# Patient Record
Sex: MALE | Race: WHITE | NOT HISPANIC OR LATINO | Employment: OTHER | URBAN - METROPOLITAN AREA
[De-identification: names, ages, dates, MRNs, and addresses within clinical notes are randomized per-mention and may not be internally consistent; named-entity substitution may affect disease eponyms.]

---

## 2017-01-04 ENCOUNTER — OFFICE VISIT (OUTPATIENT)
Dept: NEUROLOGY | Facility: CLINIC | Age: 67
End: 2017-01-04

## 2017-01-04 ENCOUNTER — LAB (OUTPATIENT)
Dept: LAB | Facility: HOSPITAL | Age: 67
End: 2017-01-04
Attending: PSYCHIATRY & NEUROLOGY

## 2017-01-04 VITALS
SYSTOLIC BLOOD PRESSURE: 126 MMHG | DIASTOLIC BLOOD PRESSURE: 80 MMHG | HEIGHT: 70 IN | WEIGHT: 170 LBS | BODY MASS INDEX: 24.34 KG/M2

## 2017-01-04 DIAGNOSIS — R41.3 MEMORY LOSS: Primary | ICD-10-CM

## 2017-01-04 DIAGNOSIS — R41.3 MEMORY LOSS: ICD-10-CM

## 2017-01-04 LAB
ALBUMIN SERPL-MCNC: 4.6 G/DL (ref 3.2–4.8)
ALBUMIN/GLOB SERPL: 1.4 G/DL (ref 1.5–2.5)
ALP SERPL-CCNC: 311 U/L (ref 25–100)
ALT SERPL W P-5'-P-CCNC: 53 U/L (ref 7–40)
ANION GAP SERPL CALCULATED.3IONS-SCNC: 10 MMOL/L (ref 3–11)
AST SERPL-CCNC: 42 U/L (ref 0–33)
BASOPHILS # BLD AUTO: 0.04 10*3/MM3 (ref 0–0.2)
BASOPHILS NFR BLD AUTO: 0.4 % (ref 0–1)
BILIRUB SERPL-MCNC: 6.2 MG/DL (ref 0.3–1.2)
BUN BLD-MCNC: 19 MG/DL (ref 9–23)
BUN/CREAT SERPL: 21.1 (ref 7–25)
CALCIUM SPEC-SCNC: 10.6 MG/DL (ref 8.7–10.4)
CHLORIDE SERPL-SCNC: 98 MMOL/L (ref 99–109)
CO2 SERPL-SCNC: 28 MMOL/L (ref 20–31)
CREAT BLD-MCNC: 0.9 MG/DL (ref 0.6–1.3)
DEPRECATED RDW RBC AUTO: 50.5 FL (ref 37–54)
EOSINOPHIL # BLD AUTO: 0.05 10*3/MM3 (ref 0.1–0.3)
EOSINOPHIL NFR BLD AUTO: 0.5 % (ref 0–3)
ERYTHROCYTE [DISTWIDTH] IN BLOOD BY AUTOMATED COUNT: 13.4 % (ref 11.3–14.5)
FOLATE SERPL-MCNC: >24 NG/ML (ref 3.2–20)
GFR SERPL CREATININE-BSD FRML MDRD: 84 ML/MIN/1.73
GLOBULIN UR ELPH-MCNC: 3.2 GM/DL
GLUCOSE BLD-MCNC: 107 MG/DL (ref 70–100)
HCT VFR BLD AUTO: 46.8 % (ref 38.9–50.9)
HGB BLD-MCNC: 16.4 G/DL (ref 13.1–17.5)
IMM GRANULOCYTES # BLD: 0.01 10*3/MM3 (ref 0–0.03)
IMM GRANULOCYTES NFR BLD: 0.1 % (ref 0–0.6)
LYMPHOCYTES # BLD AUTO: 2.09 10*3/MM3 (ref 0.6–4.8)
LYMPHOCYTES NFR BLD AUTO: 22.3 % (ref 24–44)
MCH RBC QN AUTO: 36.2 PG (ref 27–31)
MCHC RBC AUTO-ENTMCNC: 35 G/DL (ref 32–36)
MCV RBC AUTO: 103.3 FL (ref 80–99)
MONOCYTES # BLD AUTO: 1.25 10*3/MM3 (ref 0–1)
MONOCYTES NFR BLD AUTO: 13.3 % (ref 0–12)
NEUTROPHILS # BLD AUTO: 5.93 10*3/MM3 (ref 1.5–8.3)
NEUTROPHILS NFR BLD AUTO: 63.4 % (ref 41–71)
PLATELET # BLD AUTO: 329 10*3/MM3 (ref 150–450)
PMV BLD AUTO: 10.8 FL (ref 6–12)
POTASSIUM BLD-SCNC: 4.6 MMOL/L (ref 3.5–5.5)
PROT SERPL-MCNC: 7.8 G/DL (ref 5.7–8.2)
RBC # BLD AUTO: 4.53 10*6/MM3 (ref 4.2–5.76)
RPR SER QL: NORMAL
SODIUM BLD-SCNC: 136 MMOL/L (ref 132–146)
TSH SERPL DL<=0.05 MIU/L-ACNC: 2.27 MIU/ML (ref 0.35–5.35)
VIT B12 BLD-MCNC: 227 PG/ML (ref 211–911)
WBC NRBC COR # BLD: 9.37 10*3/MM3 (ref 3.5–10.8)

## 2017-01-04 PROCEDURE — 82607 VITAMIN B-12: CPT | Performed by: PSYCHIATRY & NEUROLOGY

## 2017-01-04 PROCEDURE — 85025 COMPLETE CBC W/AUTO DIFF WBC: CPT | Performed by: PSYCHIATRY & NEUROLOGY

## 2017-01-04 PROCEDURE — 84443 ASSAY THYROID STIM HORMONE: CPT | Performed by: PSYCHIATRY & NEUROLOGY

## 2017-01-04 PROCEDURE — 80053 COMPREHEN METABOLIC PANEL: CPT | Performed by: PSYCHIATRY & NEUROLOGY

## 2017-01-04 PROCEDURE — 99204 OFFICE O/P NEW MOD 45 MIN: CPT | Performed by: PSYCHIATRY & NEUROLOGY

## 2017-01-04 PROCEDURE — 36415 COLL VENOUS BLD VENIPUNCTURE: CPT | Performed by: PSYCHIATRY & NEUROLOGY

## 2017-01-04 PROCEDURE — 86592 SYPHILIS TEST NON-TREP QUAL: CPT | Performed by: PSYCHIATRY & NEUROLOGY

## 2017-01-04 PROCEDURE — 82746 ASSAY OF FOLIC ACID SERUM: CPT | Performed by: PSYCHIATRY & NEUROLOGY

## 2017-01-04 NOTE — PROGRESS NOTES
"Subjective     Ubaldo Mcleod is seen today in consultation at the request of Dr. Costa for evaluation of memory impairment.      History of Present Illness   Ubaldo Mcleod is a 66 y.o. male who comes to clinic today for evaluation of memory impairment. He has noted symptoms since approximately mid-2016 marked primarily by forgetfulness. This has gradually worsened  over time. He now has to keep notes or lists at work. Additional associated symptoms have included impairments in word-finding or naming. He has had associated  symptoms of depression . He does note fragmented sleep and non-refreshing sleep, though does not snore. He does endorse excessive daytime somnolence. There are no exacerbating or alleviating factors identified. He denies  impairments in ADL's. He continues to work in construction.  He is currently residing at his home in [place].     HPI elements (location, quality, severity, duration, timing, context, modifying factors, associated si's/sx's)      The following portions of the patient's history were reviewed and updated as appropriate: allergies, current medications, past family history, past medical history, past social history, past surgical history and problem list.    Review of Systems   Constitutional: Positive for fatigue.   Musculoskeletal: Positive for neck pain.   All other systems reviewed and are negative.      Objective   General appearance today is normal.   Peripheral pulses were present and symmetric.   The ophthalmoscopic exam today is unremarkable. The discs and posterior elements are unremarkable.    Visit Vitals   • /80   • Ht 70\" (177.8 cm)   • Wt 170 lb (77.1 kg)   • BMI 24.39 kg/m2       Physical Exam   Constitutional: He is oriented to person, place, and time.   Neurological: He is oriented to person, place, and time. He has normal strength. He has a normal Finger-Nose-Finger Test. Gait normal.   Reflex Scores:       Tricep reflexes are 2+ on the right side " and 2+ on the left side.       Bicep reflexes are 2+ on the right side and 2+ on the left side.       Brachioradialis reflexes are 2+ on the right side and 2+ on the left side.       Patellar reflexes are 2+ on the right side and 2+ on the left side.       Achilles reflexes are 2+ on the right side and 2+ on the left side.  Psychiatric: His speech is normal.        Neurologic Exam     Mental Status   Oriented to person, place, and time.   Registration: recalls 3 of 3 objects. Recall at 5 minutes: recalls 3 of 3 objects. Follows 3 step commands.   Attention: normal. Concentration: normal.   Speech: speech is normal   Level of consciousness: alert  Knowledge: good.   Able to name object. Able to read. Able to repeat. Able to write. Normal comprehension.     Cranial Nerves   Cranial nerves II through XII intact.     Motor Exam   Muscle bulk: normal  Overall muscle tone: normal    Strength   Strength 5/5 throughout.     Sensory Exam   Light touch normal.     Gait, Coordination, and Reflexes     Gait  Gait: normal    Coordination   Finger to nose coordination: normal    Reflexes   Right brachioradialis: 2+  Left brachioradialis: 2+  Right biceps: 2+  Left biceps: 2+  Right triceps: 2+  Left triceps: 2+  Right patellar: 2+  Left patellar: 2+  Right achilles: 2+  Left achilles: 2+      MMSE=30  MoCA=29 (4/5 recall)      Assessment/Plan   Ubaldo was seen today for memory loss.    Diagnoses and all orders for this visit:    Memory loss  -     CBC & Differential; Future  -     Comprehensive Metabolic Panel  -     CT Head Without Contrast  -     Folate  -     RPR  -     TSH  -     Vitamin B12          DISCUSSION/SUMMARY    Ubaldo Mcleod comes to clinic today for evaluation of memory impairment. His history and examination, including bedside cognitive testing are most consistent with normal aging, versus MCI (in the setting of a very high premorbid baseline) versus depression or an underlying sleep disorder, or even a  combination of factors, which was discussed. For now it was elected to obtain screening blood work , a head CT  and neuropsychological testing . Pending these results, I might consider referral to the sleep clinic, which was also discussed.     As part of this visit I reviewed Dr. Costa's notes. Please see above for additional details.

## 2017-01-04 NOTE — LETTER
January 4, 2017     Glenn Costa MD  [address]  [facility] [address]    Patient: Ubaldo Mcleod   YOB: 1950   Date of Visit: 1/4/2017       Dear Dr. Igor MD:    Thank you for referring Ubaldo Mcleod to me for evaluation. Below are the relevant portions of my assessment and plan of care.      Ubaldo was seen today for memory loss.    Diagnoses and all orders for this visit:    Memory loss  -     CBC & Differential; Future  -     Comprehensive Metabolic Panel  -     CT Head Without Contrast  -     Folate  -     RPR  -     TSH  -     Vitamin B12          DISCUSSION/SUMMARY    Ubaldo Mcleod comes to clinic today for evaluation of memory impairment. His history and examination, including bedside cognitive testing are most consistent with normal aging, versus MCI (in the setting of a very high premorbid baseline) versus depression or an underlying sleep disorder, or even a combination of factors, which was discussed. For now it was elected to obtain screening blood work , a head CT  and neuropsychological testing . Pending these results, I might consider referral to the sleep clinic, which was also discussed.     As part of this visit I reviewed Dr. Costa's notes. Please see above for additional details.                If you have questions, please do not hesitate to call me. I look forward to following Ubaldo along with you.         Sincerely,        Cliff Cunningham MD        CC: No Recipients

## 2017-01-04 NOTE — MR AVS SNAPSHOT
[facility] NEUROLOGY  [phone]                    Ubaldo Mcleod   1/4/2017 8:30 AM   Office Visit    Dept Phone:  [phone]   Encounter #:  [id number]    Provider:  Cliff Cunningham MD   Department:  [facility] NEUROLOGY                Your Full Care Plan              Your Updated Medication List          This list is accurate as of: 1/4/17  9:09 AM.  Always use your most recent med list.                folic acid 1 MG tablet   Commonly known as:  FOLVITE   Take 1 tablet by mouth daily.       pantoprazole 40 MG EC tablet   Commonly known as:  PROTONIX   Take 1 tablet by mouth 2 (two) times a day.               We Performed the Following     Ambulatory Referral to Neuropsychology     Comprehensive Metabolic Panel     CT Head Without Contrast     Folate     RPR     TSH     Vitamin B12       You Were Diagnosed With        Codes Comments    Memory loss    -  Primary ICD-10-CM: R41.3  ICD-9-CM: 780.93       Instructions     None    Patient Instructions History      Upcoming Appointments     Visit Type Date Time Department    NEW PATIENT 1/4/2017  8:30 AM [facility]      [employer] Signup     Our records indicate that you have an active [employer] account.    You can view your After Visit Summary by going to [employer] and logging in with your [employer] username and password.  If you don't have a [employer] username and password but a parent or guardian has access to your record, the parent or guardian should login with their own [employer] username and password and access your record to view the After Visit Summary.    If you have questions, you can email [email or web address] or call [phone] to talk to our [employer] staff.  Remember, [employer] is NOT to be used for urgent needs.  For medical emergencies, dial 911.               Other Info from Your Visit           Allergies     No Known Allergies      Reason for Visit     Memory Loss     "         Vital Signs     Blood Pressure Height Weight Body Mass Index Smoking Status       126/80 70\" (177.8 cm) 170 lb (77.1 kg) 24.39 kg/m2 Current Every Day Smoker       Problems and Diagnoses Noted     Memory loss        "

## 2017-01-09 ENCOUNTER — HOSPITAL ENCOUNTER (OUTPATIENT)
Dept: CT IMAGING | Facility: HOSPITAL | Age: 67
Discharge: HOME | End: 2017-01-09
Attending: PSYCHIATRY & NEUROLOGY | Admitting: PSYCHIATRY & NEUROLOGY

## 2017-01-09 PROCEDURE — 70450 CT HEAD/BRAIN W/O DYE: CPT

## 2017-01-25 ENCOUNTER — TELEPHONE (OUTPATIENT)
Dept: FAMILY MEDICINE CLINIC | Facility: CLINIC | Age: 67
End: 2017-01-25

## 2017-01-25 NOTE — TELEPHONE ENCOUNTER
Per Dr. Costa the ct of the pt's head was essentially normal (some small blood vessel changes were present related to atherosclerosis , commonly seen in older patients)

## 2017-01-25 NOTE — TELEPHONE ENCOUNTER
----- Message from Brandi Viramontes sent at 1/17/2017 11:25 AM EST -----  Regarding: RESULTS  Contact: [phone]  PT WOULD LIKE CT RESULTS

## 2017-06-12 DIAGNOSIS — K22.70 BARRETT'S ESOPHAGUS WITHOUT DYSPLASIA: ICD-10-CM

## 2017-06-12 RX ORDER — PANTOPRAZOLE SODIUM 40 MG/1
40 TABLET, DELAYED RELEASE ORAL 2 TIMES DAILY
Qty: 60 TABLET | Refills: 5 | Status: SHIPPED | OUTPATIENT
Start: 2017-06-12 | End: 2018-01-26 | Stop reason: SDUPTHER

## 2017-06-29 ENCOUNTER — OFFICE VISIT (OUTPATIENT)
Dept: FAMILY MEDICINE CLINIC | Facility: CLINIC | Age: 67
End: 2017-06-29

## 2017-06-29 VITALS
HEIGHT: 70 IN | OXYGEN SATURATION: 96 % | TEMPERATURE: 98.9 F | SYSTOLIC BLOOD PRESSURE: 140 MMHG | WEIGHT: 178 LBS | DIASTOLIC BLOOD PRESSURE: 86 MMHG | BODY MASS INDEX: 25.48 KG/M2 | HEART RATE: 95 BPM

## 2017-06-29 DIAGNOSIS — M54.50 ACUTE LEFT-SIDED LOW BACK PAIN WITHOUT SCIATICA: Primary | ICD-10-CM

## 2017-06-29 LAB
ALBUMIN SERPL-MCNC: 4.2 G/DL (ref 3.2–4.8)
ALBUMIN/GLOB SERPL: 1.4 G/DL (ref 1.5–2.5)
ALP SERPL-CCNC: 408 U/L (ref 25–100)
ALT SERPL W P-5'-P-CCNC: 48 U/L (ref 7–40)
ANION GAP SERPL CALCULATED.3IONS-SCNC: 5 MMOL/L (ref 3–11)
AST SERPL-CCNC: 60 U/L (ref 0–33)
BASOPHILS # BLD AUTO: 0.03 10*3/MM3 (ref 0–0.2)
BASOPHILS NFR BLD AUTO: 0.4 % (ref 0–1)
BILIRUB BLD-MCNC: ABNORMAL MG/DL
BILIRUB SERPL-MCNC: 2.3 MG/DL (ref 0.3–1.2)
BUN BLD-MCNC: 12 MG/DL (ref 9–23)
BUN/CREAT SERPL: 13.3 (ref 7–25)
CALCIUM SPEC-SCNC: 10 MG/DL (ref 8.7–10.4)
CHLORIDE SERPL-SCNC: 105 MMOL/L (ref 99–109)
CLARITY, POC: CLEAR
CO2 SERPL-SCNC: 30 MMOL/L (ref 20–31)
COLOR UR: ABNORMAL
CREAT BLD-MCNC: 0.9 MG/DL (ref 0.6–1.3)
CRP SERPL-MCNC: 0.09 MG/DL (ref 0–1)
DEPRECATED RDW RBC AUTO: 53.6 FL (ref 37–54)
EOSINOPHIL # BLD AUTO: 0.04 10*3/MM3 (ref 0–0.3)
EOSINOPHIL NFR BLD AUTO: 0.5 % (ref 0–3)
ERYTHROCYTE [DISTWIDTH] IN BLOOD BY AUTOMATED COUNT: 13.9 % (ref 11.3–14.5)
GFR SERPL CREATININE-BSD FRML MDRD: 84 ML/MIN/1.73
GLOBULIN UR ELPH-MCNC: 3 GM/DL
GLUCOSE BLD-MCNC: 88 MG/DL (ref 70–100)
GLUCOSE UR STRIP-MCNC: NEGATIVE MG/DL
HCT VFR BLD AUTO: 49.4 % (ref 38.9–50.9)
HGB BLD-MCNC: 16.1 G/DL (ref 13.1–17.5)
IMM GRANULOCYTES # BLD: 0.02 10*3/MM3 (ref 0–0.03)
IMM GRANULOCYTES NFR BLD: 0.2 % (ref 0–0.6)
KETONES UR QL: NEGATIVE
LEUKOCYTE EST, POC: NEGATIVE
LYMPHOCYTES # BLD AUTO: 1.98 10*3/MM3 (ref 0.6–4.8)
LYMPHOCYTES NFR BLD AUTO: 24.1 % (ref 24–44)
MCH RBC QN AUTO: 34 PG (ref 27–31)
MCHC RBC AUTO-ENTMCNC: 32.6 G/DL (ref 32–36)
MCV RBC AUTO: 104.2 FL (ref 80–99)
MONOCYTES # BLD AUTO: 1.26 10*3/MM3 (ref 0–1)
MONOCYTES NFR BLD AUTO: 15.4 % (ref 0–12)
NEUTROPHILS # BLD AUTO: 4.87 10*3/MM3 (ref 1.5–8.3)
NEUTROPHILS NFR BLD AUTO: 59.4 % (ref 41–71)
NITRITE UR-MCNC: NEGATIVE MG/ML
PH UR: 5.5 [PH] (ref 5–8)
PLATELET # BLD AUTO: 302 10*3/MM3 (ref 150–450)
PMV BLD AUTO: 11.1 FL (ref 6–12)
POTASSIUM BLD-SCNC: 4.9 MMOL/L (ref 3.5–5.5)
PROT SERPL-MCNC: 7.2 G/DL (ref 5.7–8.2)
PROT UR STRIP-MCNC: ABNORMAL MG/DL
RBC # BLD AUTO: 4.74 10*6/MM3 (ref 4.2–5.76)
RBC # UR STRIP: NEGATIVE /UL
SODIUM BLD-SCNC: 140 MMOL/L (ref 132–146)
SP GR UR: 1.01 (ref 1–1.03)
UROBILINOGEN UR QL: NORMAL
WBC NRBC COR # BLD: 8.2 10*3/MM3 (ref 3.5–10.8)

## 2017-06-29 PROCEDURE — 86140 C-REACTIVE PROTEIN: CPT | Performed by: NURSE PRACTITIONER

## 2017-06-29 PROCEDURE — 36415 COLL VENOUS BLD VENIPUNCTURE: CPT | Performed by: NURSE PRACTITIONER

## 2017-06-29 PROCEDURE — 99214 OFFICE O/P EST MOD 30 MIN: CPT | Performed by: NURSE PRACTITIONER

## 2017-06-29 PROCEDURE — 80053 COMPREHEN METABOLIC PANEL: CPT | Performed by: NURSE PRACTITIONER

## 2017-06-29 PROCEDURE — 81003 URINALYSIS AUTO W/O SCOPE: CPT | Performed by: NURSE PRACTITIONER

## 2017-06-29 PROCEDURE — 85025 COMPLETE CBC W/AUTO DIFF WBC: CPT | Performed by: NURSE PRACTITIONER

## 2017-06-29 RX ORDER — TIZANIDINE 4 MG/1
4 TABLET ORAL EVERY 8 HOURS PRN
Qty: 21 TABLET | Refills: 0 | Status: SHIPPED | OUTPATIENT
Start: 2017-06-29 | End: 2017-07-06

## 2017-06-29 RX ORDER — TIZANIDINE 4 MG/1
TABLET ORAL
Refills: 0 | COMMUNITY
Start: 2017-04-19 | End: 2017-06-29 | Stop reason: SDUPTHER

## 2017-06-29 RX ORDER — TOBRAMYCIN AND DEXAMETHASONE 3; 1 MG/ML; MG/ML
SUSPENSION/ DROPS OPHTHALMIC
Refills: 0 | COMMUNITY
Start: 2017-04-04 | End: 2018-06-18

## 2017-06-29 NOTE — PATIENT INSTRUCTIONS
Back Pain, Adult  Back pain is very common in adults. The cause of back pain is rarely dangerous and the pain often gets better over time. The cause of your back pain may not be known. Some common causes of back pain include:  · Strain of the muscles or ligaments supporting the spine.  · Wear and tear (degeneration) of the spinal disks.  · Arthritis.  · Direct injury to the back.  For many people, back pain may return. Since back pain is rarely dangerous, most people can learn to manage this condition on their own.  HOME CARE INSTRUCTIONS  Watch your back pain for any changes. The following actions may help to lessen any discomfort you are feeling:  · Remain active. It is stressful on your back to sit or  one place for long periods of time. Do not sit, drive, or  one place for more than 30 minutes at a time. Take short walks on even surfaces as soon as you are able. Try to increase the length of time you walk each day.  · Exercise regularly as directed by your health care provider. Exercise helps your back heal faster. It also helps avoid future injury by keeping your muscles strong and flexible.  · Do not stay in bed. Resting more than 1-2 days can delay your recovery.  · Pay attention to your body when you bend and lift. The most comfortable positions are those that put less stress on your recovering back. Always use proper lifting techniques, including:    Bending your knees.    Keeping the load close to your body.    Avoiding twisting.  · Find a comfortable position to sleep. Use a firm mattress and lie on your side with your knees slightly bent. If you lie on your back, put a pillow under your knees.  · Avoid feeling anxious or stressed. Stress increases muscle tension and can worsen back pain. It is important to recognize when you are anxious or stressed and learn ways to manage it, such as with exercise.  · Take medicines only as directed by your health care provider. Over-the-counter  medicines to reduce pain and inflammation are often the most helpful. Your health care provider may prescribe muscle relaxant drugs. These medicines help dull your pain so you can more quickly return to your normal activities and healthy exercise.  · Apply ice to the injured area:    Put ice in a plastic bag.    Place a towel between your skin and the bag.    Leave the ice on for 20 minutes, 2-3 times a day for the first 2-3 days. After that, ice and heat may be alternated to reduce pain and spasms.  · Maintain a healthy weight. Excess weight puts extra stress on your back and makes it difficult to maintain good posture.  SEEK MEDICAL CARE IF:  · You have pain that is not relieved with rest or medicine.  · You have increasing pain going down into the legs or buttocks.  · You have pain that does not improve in one week.  · You have night pain.  · You lose weight.  · You have a fever or chills.  SEEK IMMEDIATE MEDICAL CARE IF:   · You develop new bowel or bladder control problems.  · You have unusual weakness or numbness in your arms or legs.  · You develop nausea or vomiting.  · You develop abdominal pain.  · You feel faint.     This information is not intended to replace advice given to you by your health care provider. Make sure you discuss any questions you have with your health care provider.     Document Released: 12/18/2006 Document Revised: 01/08/2016 Document Reviewed: 04/21/2015  [employer] Patient Education ©2017 [employer]

## 2017-06-29 NOTE — PROGRESS NOTES
Subjective   Ubaldo Mcleod is a 66 y.o. male.   Chief Complaint   Patient presents with   • Back Pain     x 3 days       History of Present Illness   Starting having pain on Tuesday - started on the right side of back and yesterday the left side started more intense with a stabbing type intensity . Denies, numbness or tingling.  When sitting still reports there is no pain.     Has been working at the computer over the past few days.   Has taken zanaflex twice, has now ran out of the medication. Reports taking ibuprofen twice over the past  24 hours with some relief.     Has been using heat and a message pad in his chair. No significant change.   Exposure: unknown - denies falling, or any lifting injury.       The following portions of the patient's history were reviewed and updated as appropriate: allergies, current medications, past family history, past medical history, past social history, past surgical history and problem list.    Review of Systems   Constitutional: Positive for activity change. Negative for appetite change, chills, fatigue and fever.   HENT: Negative.    Eyes: Positive for visual disturbance.        Having trouble with eyes. Had cataract surgery with lasik revision. Some times it is still slightly blurred. Have a follow up appointment on 07/06/2017.   Respiratory: Negative.    Cardiovascular: Negative.    Gastrointestinal: Negative.    Genitourinary: Negative.  Negative for difficulty urinating, dysuria, flank pain, frequency and urgency.   Musculoskeletal: Positive for back pain.        Back hurts worse with leaving out of sitting or lying positions.      Skin: Negative.    Allergic/Immunologic: Positive for environmental allergies.   Neurological: Negative.  Negative for dizziness, facial asymmetry and headaches.   Psychiatric/Behavioral: Negative.        Objective   No Known Allergies    Physical Exam   Constitutional: He is oriented to person, place, and time. Vital signs are normal.  He appears well-developed and well-nourished. He does not appear ill.   Cardiovascular: Normal rate, regular rhythm and normal heart sounds.    Pulmonary/Chest: Effort normal and breath sounds normal.   Musculoskeletal:        Arms:  No CVA tenderness or flank tenderness.    Patient is able to do straight leg raises. Reported left leg lifting caused a tightening feeling - relieved with lying it back down on the exam table.    Neurological: He is alert and oriented to person, place, and time.   Skin: Skin is warm and dry. Abrasion noted. No rash noted.   Patient has very thin skin with several small areas of ecchymosis.   Right shin with small abrasion.    Psychiatric: He has a normal mood and affect. His behavior is normal. Judgment and thought content normal.   Vitals reviewed.      Assessment/Plan   Ubaldo was seen today for back pain.    Diagnoses and all orders for this visit:    Acute left-sided low back pain without sciatica  -     diclofenac (FLECTOR) 1.3 % patch patch; Apply 1 patch topically 2 (Two) Times a Day for 10 days.  -     tiZANidine (ZANAFLEX) 4 MG tablet; Take 1 tablet by mouth Every 8 (Eight) Hours As Needed for Muscle Spasms for up to 7 days.  -     POCT urinalysis dipstick, automated  -     Cancel: CBC and differential; Future  -     Cancel: Comprehensive metabolic panel; Future  -     Cancel: C-reactive protein; Future  -     CBC w AUTO Differential  -     Comprehensive Metabolic Panel  -     C-reactive Protein  -     CBC Auto Differential               Do not take any products that contain acetaminophen.  Follow up in 1 week with Dr. Costa or sooner if symptoms worsen or fail to improve.   Patient is agreeable with the plan.      VIKTORIYA Jimenez

## 2017-07-05 DIAGNOSIS — E83.118 OTHER HEMOCHROMATOSIS: ICD-10-CM

## 2017-07-05 RX ORDER — FOLIC ACID 1 MG/1
TABLET ORAL
Qty: 30 TABLET | Refills: 5 | Status: SHIPPED | OUTPATIENT
Start: 2017-07-05 | End: 2018-01-15 | Stop reason: SDUPTHER

## 2018-01-15 DIAGNOSIS — E83.118 OTHER HEMOCHROMATOSIS: ICD-10-CM

## 2018-01-15 RX ORDER — FOLIC ACID 1 MG/1
TABLET ORAL
Qty: 30 TABLET | Refills: 5 | Status: SHIPPED | OUTPATIENT
Start: 2018-01-15 | End: 2018-05-21 | Stop reason: SDUPTHER

## 2018-01-26 DIAGNOSIS — K22.70 BARRETT'S ESOPHAGUS WITHOUT DYSPLASIA: ICD-10-CM

## 2018-01-26 RX ORDER — PANTOPRAZOLE SODIUM 40 MG/1
TABLET, DELAYED RELEASE ORAL
Qty: 60 TABLET | Refills: 5 | Status: SHIPPED | OUTPATIENT
Start: 2018-01-26 | End: 2018-01-26 | Stop reason: SDUPTHER

## 2018-01-27 RX ORDER — PANTOPRAZOLE SODIUM 40 MG/1
40 TABLET, DELAYED RELEASE ORAL
Qty: 60 TABLET | Refills: 0 | Status: SHIPPED | OUTPATIENT
Start: 2018-01-27 | End: 2018-05-21 | Stop reason: SDUPTHER

## 2018-05-21 ENCOUNTER — OFFICE VISIT (OUTPATIENT)
Dept: FAMILY MEDICINE CLINIC | Facility: CLINIC | Age: 68
End: 2018-05-21

## 2018-05-21 VITALS
WEIGHT: 176 LBS | TEMPERATURE: 98.2 F | BODY MASS INDEX: 25.2 KG/M2 | SYSTOLIC BLOOD PRESSURE: 132 MMHG | HEIGHT: 70 IN | DIASTOLIC BLOOD PRESSURE: 88 MMHG | HEART RATE: 106 BPM | OXYGEN SATURATION: 96 %

## 2018-05-21 DIAGNOSIS — K22.70 BARRETT'S ESOPHAGUS WITHOUT DYSPLASIA: ICD-10-CM

## 2018-05-21 DIAGNOSIS — E83.118 OTHER HEMOCHROMATOSIS: ICD-10-CM

## 2018-05-21 DIAGNOSIS — R60.0 BILATERAL LOWER EXTREMITY EDEMA: ICD-10-CM

## 2018-05-21 DIAGNOSIS — I10 ESSENTIAL HYPERTENSION: Primary | ICD-10-CM

## 2018-05-21 LAB
ALBUMIN SERPL-MCNC: 3.9 G/DL (ref 3.2–4.8)
ALBUMIN/GLOB SERPL: 1.5 G/DL (ref 1.5–2.5)
ALP SERPL-CCNC: 305 U/L (ref 25–100)
ALT SERPL W P-5'-P-CCNC: 28 U/L (ref 7–40)
ANION GAP SERPL CALCULATED.3IONS-SCNC: 8 MMOL/L (ref 3–11)
ARTICHOKE IGE QN: 98 MG/DL (ref 0–130)
AST SERPL-CCNC: 39 U/L (ref 0–33)
BASOPHILS # BLD AUTO: 0.02 10*3/MM3 (ref 0–0.2)
BASOPHILS NFR BLD AUTO: 0.2 % (ref 0–1)
BILIRUB SERPL-MCNC: 2.9 MG/DL (ref 0.3–1.2)
BUN BLD-MCNC: 11 MG/DL (ref 9–23)
BUN/CREAT SERPL: 13.8 (ref 7–25)
CALCIUM SPEC-SCNC: 8.9 MG/DL (ref 8.7–10.4)
CHLORIDE SERPL-SCNC: 106 MMOL/L (ref 99–109)
CHOLEST SERPL-MCNC: 184 MG/DL (ref 0–200)
CO2 SERPL-SCNC: 27 MMOL/L (ref 20–31)
CREAT BLD-MCNC: 0.8 MG/DL (ref 0.6–1.3)
DEPRECATED RDW RBC AUTO: 55.6 FL (ref 37–54)
EOSINOPHIL # BLD AUTO: 0.01 10*3/MM3 (ref 0–0.3)
EOSINOPHIL NFR BLD AUTO: 0.1 % (ref 0–3)
ERYTHROCYTE [DISTWIDTH] IN BLOOD BY AUTOMATED COUNT: 14.4 % (ref 11.3–14.5)
ERYTHROCYTE [SEDIMENTATION RATE] IN BLOOD: 9 MM/HR (ref 0–20)
GFR SERPL CREATININE-BSD FRML MDRD: 96 ML/MIN/1.73
GLOBULIN UR ELPH-MCNC: 2.6 GM/DL
GLUCOSE BLD-MCNC: 118 MG/DL (ref 70–100)
HCT VFR BLD AUTO: 46.6 % (ref 38.9–50.9)
HDLC SERPL-MCNC: 85 MG/DL (ref 40–60)
HGB BLD-MCNC: 15.4 G/DL (ref 13.1–17.5)
IMM GRANULOCYTES # BLD: 0.01 10*3/MM3 (ref 0–0.03)
IMM GRANULOCYTES NFR BLD: 0.1 % (ref 0–0.6)
LYMPHOCYTES # BLD AUTO: 1.44 10*3/MM3 (ref 0.6–4.8)
LYMPHOCYTES NFR BLD AUTO: 15.6 % (ref 24–44)
MCH RBC QN AUTO: 34.2 PG (ref 27–31)
MCHC RBC AUTO-ENTMCNC: 33 G/DL (ref 32–36)
MCV RBC AUTO: 103.6 FL (ref 80–99)
MONOCYTES # BLD AUTO: 1.18 10*3/MM3 (ref 0–1)
MONOCYTES NFR BLD AUTO: 12.8 % (ref 0–12)
NEUTROPHILS # BLD AUTO: 6.57 10*3/MM3 (ref 1.5–8.3)
NEUTROPHILS NFR BLD AUTO: 71.2 % (ref 41–71)
PLATELET # BLD AUTO: 328 10*3/MM3 (ref 150–450)
PMV BLD AUTO: 11.1 FL (ref 6–12)
POTASSIUM BLD-SCNC: 4.5 MMOL/L (ref 3.5–5.5)
PROT SERPL-MCNC: 6.5 G/DL (ref 5.7–8.2)
RBC # BLD AUTO: 4.5 10*6/MM3 (ref 4.2–5.76)
SODIUM BLD-SCNC: 141 MMOL/L (ref 132–146)
TRIGL SERPL-MCNC: 59 MG/DL (ref 0–150)
WBC NRBC COR # BLD: 9.23 10*3/MM3 (ref 3.5–10.8)

## 2018-05-21 PROCEDURE — 85025 COMPLETE CBC W/AUTO DIFF WBC: CPT | Performed by: FAMILY MEDICINE

## 2018-05-21 PROCEDURE — 36415 COLL VENOUS BLD VENIPUNCTURE: CPT | Performed by: FAMILY MEDICINE

## 2018-05-21 PROCEDURE — 80053 COMPREHEN METABOLIC PANEL: CPT | Performed by: FAMILY MEDICINE

## 2018-05-21 PROCEDURE — 80061 LIPID PANEL: CPT | Performed by: FAMILY MEDICINE

## 2018-05-21 PROCEDURE — 85652 RBC SED RATE AUTOMATED: CPT | Performed by: FAMILY MEDICINE

## 2018-05-21 PROCEDURE — 99214 OFFICE O/P EST MOD 30 MIN: CPT | Performed by: FAMILY MEDICINE

## 2018-05-21 RX ORDER — FOLIC ACID 1 MG/1
1000 TABLET ORAL DAILY
Qty: 30 TABLET | Refills: 11 | Status: SHIPPED | OUTPATIENT
Start: 2018-05-21 | End: 2019-07-02 | Stop reason: SDUPTHER

## 2018-05-21 RX ORDER — LOSARTAN POTASSIUM AND HYDROCHLOROTHIAZIDE 12.5; 5 MG/1; MG/1
1 TABLET ORAL DAILY
Qty: 30 TABLET | Refills: 5 | Status: SHIPPED | OUTPATIENT
Start: 2018-05-21 | End: 2018-06-04 | Stop reason: ALTCHOICE

## 2018-05-21 RX ORDER — PANTOPRAZOLE SODIUM 40 MG/1
40 TABLET, DELAYED RELEASE ORAL
Qty: 60 TABLET | Refills: 11 | Status: SHIPPED | OUTPATIENT
Start: 2018-05-21 | End: 2019-06-03 | Stop reason: SDUPTHER

## 2018-05-21 NOTE — PROGRESS NOTES
"Diamond Mcleod is a 67 y.o. male.     History of Present Illness   The patient is here today with c/o joint pain and swelling of both ankles and feet.    States he is having some numbness in the right hand as well.  Denies any chest pain or shortness of breath.    BP today is 132/88. 140/90 on recheck.    States he has been seeing Dr Briceño for his vision. States he had cataract surgery on both eyes. Right eye is doing well but the left eye has been repeated several times.        The following portions of the patient's history were reviewed and updated as appropriate: allergies, current medications, past social history and problem list.    Review of Systems   Constitutional: Positive for unexpected weight change. Negative for diaphoresis and fever.   Eyes: Positive for visual disturbance.        Recent cataract surgery of the right eye.  Surgery being planned on the left eye.   Respiratory: Negative for shortness of breath.    Cardiovascular: Negative for chest pain.   Skin: Positive for color change. Negative for rash.        Feet cool   Neurological: Negative for syncope and numbness.       Objective   /88 (BP Location: Left arm, Patient Position: Sitting, Cuff Size: Adult)   Pulse 106   Temp 98.2 °F (36.8 °C) (Temporal Artery )   Ht 177.8 cm (70\")   Wt 79.8 kg (176 lb)   SpO2 96%   BMI 25.25 kg/m²   Physical Exam   Musculoskeletal: He exhibits edema.   There is 2+ edema of both ankles and feet   Skin: There is erythema.   Both feet are slightly reddened  and also have cool temperature to palpation.  Pulses are diminished.       Assessment/Plan   Problem List Items Addressed This Visit        Cardiovascular and Mediastinum    Hypertension - Primary    Relevant Medications    losartan-hydrochlorothiazide (HYZAAR) 50-12.5 MG per tablet    Other Relevant Orders    Comprehensive Metabolic Panel (Completed)    CBC & Differential (Completed)    Lipid Panel (Completed)    Sedimentation Rate " (Completed)    CBC Auto Differential (Completed)       Digestive    Marroquin's esophagus    Relevant Medications    pantoprazole (PROTONIX) 40 MG EC tablet    Hemochromatosis    Relevant Medications    folic acid (FOLVITE) 1 MG tablet    Other Relevant Orders    Sedimentation Rate (Completed)      Other Visit Diagnoses     Bilateral lower extremity edema        Relevant Orders    Comprehensive Metabolic Panel (Completed)    Sedimentation Rate (Completed)        Pressure is elevated and they also has edema.  We will start him on an appetite hypertensive  that includes a diuretic.      Drink plenty fluids.    Add Losartan 50 /12.5 mg daily #30+5.    Continue other medications as doing.    Refill Folic acid and Pantoprazole x 1 year.    Check a CBC,CMP,Lipids,Sed Rate, and TSH. Report results by letter.    Follow up in 4 weeks.              Scribed for Dr Glenn Costa by Rohini Muller CMA.          I, Glenn Costa MD, personally performed the services described in this documentation, as scribed by Rohini Muller in my presence, and is both accurate and complete.

## 2018-06-01 ENCOUNTER — OFFICE VISIT (OUTPATIENT)
Dept: FAMILY MEDICINE CLINIC | Facility: CLINIC | Age: 68
End: 2018-06-01

## 2018-06-01 VITALS
BODY MASS INDEX: 24.48 KG/M2 | RESPIRATION RATE: 16 BRPM | OXYGEN SATURATION: 97 % | TEMPERATURE: 98.3 F | WEIGHT: 171 LBS | HEART RATE: 112 BPM | DIASTOLIC BLOOD PRESSURE: 70 MMHG | HEIGHT: 70 IN | SYSTOLIC BLOOD PRESSURE: 126 MMHG

## 2018-06-01 DIAGNOSIS — K22.70 BARRETT'S ESOPHAGUS WITHOUT DYSPLASIA: ICD-10-CM

## 2018-06-01 DIAGNOSIS — D01.0 CARCINOMA IN SITU OF COLON: ICD-10-CM

## 2018-06-01 DIAGNOSIS — R10.10 UPPER ABDOMINAL PAIN: Primary | ICD-10-CM

## 2018-06-01 LAB
ALBUMIN SERPL-MCNC: 4.1 G/DL (ref 3.2–4.8)
ALBUMIN/GLOB SERPL: 1.5 G/DL (ref 1.5–2.5)
ALP SERPL-CCNC: 327 U/L (ref 25–100)
ALT SERPL W P-5'-P-CCNC: 34 U/L (ref 7–40)
AMYLASE SERPL-CCNC: 22 U/L (ref 30–118)
ANION GAP SERPL CALCULATED.3IONS-SCNC: 7 MMOL/L (ref 3–11)
AST SERPL-CCNC: 54 U/L (ref 0–33)
BASOPHILS # BLD AUTO: 0.02 10*3/MM3 (ref 0–0.2)
BASOPHILS NFR BLD AUTO: 0.2 % (ref 0–1)
BILIRUB SERPL-MCNC: 4.4 MG/DL (ref 0.3–1.2)
BUN BLD-MCNC: 22 MG/DL (ref 9–23)
BUN/CREAT SERPL: 10.5 (ref 7–25)
CALCIUM SPEC-SCNC: 9.6 MG/DL (ref 8.7–10.4)
CHLORIDE SERPL-SCNC: 103 MMOL/L (ref 99–109)
CO2 SERPL-SCNC: 31 MMOL/L (ref 20–31)
CREAT BLD-MCNC: 2.1 MG/DL (ref 0.6–1.3)
DEPRECATED RDW RBC AUTO: 54.7 FL (ref 37–54)
EOSINOPHIL # BLD AUTO: 0.02 10*3/MM3 (ref 0–0.3)
EOSINOPHIL NFR BLD AUTO: 0.2 % (ref 0–3)
ERYTHROCYTE [DISTWIDTH] IN BLOOD BY AUTOMATED COUNT: 14.5 % (ref 11.3–14.5)
GFR SERPL CREATININE-BSD FRML MDRD: 32 ML/MIN/1.73
GLOBULIN UR ELPH-MCNC: 2.7 GM/DL
GLUCOSE BLD-MCNC: 111 MG/DL (ref 70–100)
HCT VFR BLD AUTO: 50.5 % (ref 38.9–50.9)
HGB BLD-MCNC: 16.7 G/DL (ref 13.1–17.5)
IMM GRANULOCYTES # BLD: 0.02 10*3/MM3 (ref 0–0.03)
IMM GRANULOCYTES NFR BLD: 0.2 % (ref 0–0.6)
LIPASE SERPL-CCNC: 32 U/L (ref 6–51)
LYMPHOCYTES # BLD AUTO: 1.53 10*3/MM3 (ref 0.6–4.8)
LYMPHOCYTES NFR BLD AUTO: 14.2 % (ref 24–44)
MCH RBC QN AUTO: 34.3 PG (ref 27–31)
MCHC RBC AUTO-ENTMCNC: 33.1 G/DL (ref 32–36)
MCV RBC AUTO: 103.7 FL (ref 80–99)
MONOCYTES # BLD AUTO: 2.14 10*3/MM3 (ref 0–1)
MONOCYTES NFR BLD AUTO: 19.9 % (ref 0–12)
NEUTROPHILS # BLD AUTO: 7.06 10*3/MM3 (ref 1.5–8.3)
NEUTROPHILS NFR BLD AUTO: 65.5 % (ref 41–71)
PLATELET # BLD AUTO: 296 10*3/MM3 (ref 150–450)
PMV BLD AUTO: 11.5 FL (ref 6–12)
POTASSIUM BLD-SCNC: 4.2 MMOL/L (ref 3.5–5.5)
PROT SERPL-MCNC: 6.8 G/DL (ref 5.7–8.2)
RBC # BLD AUTO: 4.87 10*6/MM3 (ref 4.2–5.76)
SODIUM BLD-SCNC: 141 MMOL/L (ref 132–146)
WBC NRBC COR # BLD: 10.77 10*3/MM3 (ref 3.5–10.8)

## 2018-06-01 PROCEDURE — 99213 OFFICE O/P EST LOW 20 MIN: CPT | Performed by: FAMILY MEDICINE

## 2018-06-01 PROCEDURE — 83690 ASSAY OF LIPASE: CPT | Performed by: FAMILY MEDICINE

## 2018-06-01 PROCEDURE — 80053 COMPREHEN METABOLIC PANEL: CPT | Performed by: FAMILY MEDICINE

## 2018-06-01 PROCEDURE — 36415 COLL VENOUS BLD VENIPUNCTURE: CPT | Performed by: FAMILY MEDICINE

## 2018-06-01 PROCEDURE — 85025 COMPLETE CBC W/AUTO DIFF WBC: CPT | Performed by: FAMILY MEDICINE

## 2018-06-01 PROCEDURE — 82150 ASSAY OF AMYLASE: CPT | Performed by: FAMILY MEDICINE

## 2018-06-01 NOTE — PROGRESS NOTES
"Subjective   Ubaldo Mcleod is a 67 y.o. male.     Abdominal Pain   The current episode started more than 1 month ago. The pain is located in the epigastric region. The pain is moderate. The quality of the pain is sharp. The abdominal pain does not radiate. Associated symptoms include nausea. Pertinent negatives include no constipation, diarrhea, fever or vomiting. The pain is aggravated by eating. The pain is relieved by nothing. He has tried proton pump inhibitors (Protonix 40 mg twice a day.) for the symptoms. The treatment provided no relief. His past medical history is significant for PUD.        The following portions of the patient's history were reviewed and updated as appropriate: allergies, current medications, past social history and problem list.    Review of Systems   Constitutional: Negative for fever.   Gastrointestinal: Positive for abdominal pain and nausea. Negative for constipation, diarrhea and vomiting.       Objective   /70   Pulse 112   Temp 98.3 °F (36.8 °C)   Resp 16   Ht 177.8 cm (70\")   Wt 77.6 kg (171 lb)   SpO2 97%   BMI 24.54 kg/m²   Physical Exam   Constitutional: He appears well-developed and well-nourished.   Abdominal: Soft. Bowel sounds are normal.   Nursing note and vitals reviewed.      Assessment/Plan   Problem List Items Addressed This Visit        Digestive    Marroquin's esophagus      Other Visit Diagnoses     Upper abdominal pain    -  Primary    Carcinoma in situ of colon            The patient has similar episode like this a few years ago where he had upper abdominal pain.  At that time he also is dealing with hepatitis C.  He assessed been treated with Harvoni which is a cleared his hepatitis C.  Does use anti-inflammatory medications from time to time.  His previous diagnosis was a duodenal ulcer.  He is already taking pantoprazole.  We will add Mylanta to 1 tablespoon 4 times a day.  Go ahead and refer to Dr. Pratt for EGD.  The patient also has a " prior history of carcinoma in situ of the colon and is not had follow-up on his colonoscopy.  However we will defer on that until after he gets the EGD done.  He also does have a prior history of Marroquin's.  Check laboratory studies and report the results to patient when he returns in 3 days.  Clear liquids over the weekend.      Drink plenty fluids.  avoid NSAID,s    Use OTC Mylanta 2 or Maloox.    Continue medications as doing.    Check a CBC,CMP,Amylsae and Lipase. Report results by letter.    Refer to Gastroenterology Dr Pratt for EGD and colonoscopy.    Follow up in 3 days.                Scribed for Dr Glenn Costa by Rohini Muller CMA.          I, Glenn Costa MD, personally performed the services described in this documentation, as scribed by Rohini Muller in my presence, and is both accurate and complete.

## 2018-06-04 ENCOUNTER — OFFICE VISIT (OUTPATIENT)
Dept: FAMILY MEDICINE CLINIC | Facility: CLINIC | Age: 68
End: 2018-06-04

## 2018-06-04 VITALS
BODY MASS INDEX: 24.05 KG/M2 | SYSTOLIC BLOOD PRESSURE: 100 MMHG | WEIGHT: 168 LBS | RESPIRATION RATE: 16 BRPM | OXYGEN SATURATION: 94 % | HEART RATE: 110 BPM | DIASTOLIC BLOOD PRESSURE: 60 MMHG | TEMPERATURE: 98.2 F | HEIGHT: 70 IN

## 2018-06-04 DIAGNOSIS — R10.13 EPIGASTRIC PAIN: ICD-10-CM

## 2018-06-04 DIAGNOSIS — I10 ESSENTIAL HYPERTENSION: ICD-10-CM

## 2018-06-04 DIAGNOSIS — E53.8 VITAMIN B 12 DEFICIENCY: ICD-10-CM

## 2018-06-04 DIAGNOSIS — N18.30 STAGE 3 CHRONIC KIDNEY DISEASE (HCC): Primary | ICD-10-CM

## 2018-06-04 LAB
BUN BLD-MCNC: 21 MG/DL (ref 9–23)
CREAT BLD-MCNC: 1.6 MG/DL (ref 0.6–1.3)
GFR SERPL CREATININE-BSD FRML MDRD: 43 ML/MIN/1.73
VIT B12 BLD-MCNC: 409 PG/ML (ref 211–911)

## 2018-06-04 PROCEDURE — 96372 THER/PROPH/DIAG INJ SC/IM: CPT | Performed by: FAMILY MEDICINE

## 2018-06-04 PROCEDURE — 36415 COLL VENOUS BLD VENIPUNCTURE: CPT | Performed by: FAMILY MEDICINE

## 2018-06-04 PROCEDURE — 99214 OFFICE O/P EST MOD 30 MIN: CPT | Performed by: FAMILY MEDICINE

## 2018-06-04 PROCEDURE — 82565 ASSAY OF CREATININE: CPT | Performed by: FAMILY MEDICINE

## 2018-06-04 PROCEDURE — 82607 VITAMIN B-12: CPT | Performed by: FAMILY MEDICINE

## 2018-06-04 PROCEDURE — 84520 ASSAY OF UREA NITROGEN: CPT | Performed by: FAMILY MEDICINE

## 2018-06-04 RX ORDER — CYANOCOBALAMIN 1000 UG/ML
1000 INJECTION, SOLUTION INTRAMUSCULAR; SUBCUTANEOUS
Qty: 1 ML | Refills: 10 | Status: SHIPPED | OUTPATIENT
Start: 2018-06-04 | End: 2018-09-17 | Stop reason: SDUPTHER

## 2018-06-04 RX ORDER — CYANOCOBALAMIN 1000 UG/ML
1000 INJECTION, SOLUTION INTRAMUSCULAR; SUBCUTANEOUS
Status: DISCONTINUED | OUTPATIENT
Start: 2018-06-04 | End: 2018-09-17

## 2018-06-04 RX ORDER — SYRINGE W-NEEDLE,DISPOSAB,3 ML 25GX5/8"
SYRINGE, EMPTY DISPOSABLE MISCELLANEOUS
Qty: 10 EACH | Refills: 1 | Status: SHIPPED | OUTPATIENT
Start: 2018-06-04 | End: 2019-06-05 | Stop reason: SDUPTHER

## 2018-06-04 RX ORDER — LOSARTAN POTASSIUM 50 MG/1
50 TABLET ORAL DAILY
Qty: 30 TABLET | Refills: 5 | Status: SHIPPED | OUTPATIENT
Start: 2018-06-04 | End: 2018-06-22 | Stop reason: SINTOL

## 2018-06-04 RX ORDER — CYANOCOBALAMIN 1000 UG/ML
1000 INJECTION, SOLUTION INTRAMUSCULAR; SUBCUTANEOUS ONCE
Status: CANCELLED | OUTPATIENT
Start: 2018-06-04

## 2018-06-04 RX ADMIN — CYANOCOBALAMIN 1000 MCG: 1000 INJECTION, SOLUTION INTRAMUSCULAR; SUBCUTANEOUS at 16:14

## 2018-06-04 NOTE — PROGRESS NOTES
"Diamond Mcleod is a 67 y.o. male.     History of Present Illness   The patient is here for a follow up on abdominal pain and renal failure.    States he is still having the abdominal pain but not as bad.  His laboratory studies were remarkable for renal insufficiency with a.m. creatinine of 2.1.  This had risen from a previous value of 1.4.  The patient admits to using [place] on many nights.  He is now off of ibuprofen and Aleve.  This apparently has helped his abdominal discomfort.  He is drinking more fluids.  The patient also has a prior history of vitamin B12 deficiency.  He has not received any vitamin B12 in recent months.  Denies any chest pain or shortness of breath.        The following portions of the patient's history were reviewed and updated as appropriate: allergies, current medications, past social history and problem list.    Review of Systems   Respiratory: Negative for shortness of breath.    Cardiovascular: Negative for chest pain.   Gastrointestinal: Positive for abdominal pain.       Objective   /60   Pulse 110   Temp 98.2 °F (36.8 °C)   Resp 16   Ht 177.8 cm (70\")   Wt 76.2 kg (168 lb)   SpO2 94%   BMI 24.11 kg/m²   Physical Exam   Constitutional: He is oriented to person, place, and time. He appears well-developed and well-nourished.   Cardiovascular: Normal rate and regular rhythm.    No murmur heard.  Pulmonary/Chest: Effort normal and breath sounds normal. No respiratory distress. He has no wheezes.   Abdominal: Soft. Bowel sounds are normal. He exhibits no distension and no mass. There is no tenderness. No hernia.   Neurological: He is alert and oriented to person, place, and time.   Nursing note and vitals reviewed.      Assessment/Plan   Problem List Items Addressed This Visit        Cardiovascular and Mediastinum    Hypertension    Relevant Medications    losartan (COZAAR) 50 MG tablet      Other Visit Diagnoses     Stage 3 chronic kidney disease    -  " "Primary    Relevant Orders    BUN (Completed)    Creatinine, Serum (Completed)    Vitamin B 12 deficiency        Relevant Medications    cyanocobalamin 1000 MCG/ML injection    Syringe 25G X 1\" 3 ML misc    cyanocobalamin injection 1,000 mcg    Other Relevant Orders    Vitamin B12 (Completed)    Epigastric pain                  Drink plenty fluids.  Avoid Advil and Aleve.  Use Tylenol instead.    Change the Losartan Hctz to Losartan 50 mg daily.    Continue other medications as doing.    Restart Vitamin B 12 injections 1 ML once a month. First injection here today.  1 ML IM today.    Rx for Cyanocobalamin 1,000 mcg/ML 1 ML each month # 1 ML +10.    Follow up on the 18 th as scheduled.              Scribed for Dr Glenn Costa by Rohini Muller CMA.          I, Glenn Costa MD, personally performed the services described in this documentation, as scribed by Rohini Muller in my presence, and is both accurate and complete.      "

## 2018-06-18 ENCOUNTER — OFFICE VISIT (OUTPATIENT)
Dept: FAMILY MEDICINE CLINIC | Facility: CLINIC | Age: 68
End: 2018-06-18

## 2018-06-18 VITALS
WEIGHT: 168 LBS | TEMPERATURE: 98.7 F | HEART RATE: 101 BPM | SYSTOLIC BLOOD PRESSURE: 110 MMHG | DIASTOLIC BLOOD PRESSURE: 70 MMHG | HEIGHT: 70 IN | BODY MASS INDEX: 24.05 KG/M2 | RESPIRATION RATE: 18 BRPM | OXYGEN SATURATION: 96 %

## 2018-06-18 DIAGNOSIS — I10 ESSENTIAL HYPERTENSION: Primary | ICD-10-CM

## 2018-06-18 DIAGNOSIS — R79.89 ELEVATED SERUM CREATININE: ICD-10-CM

## 2018-06-18 DIAGNOSIS — N40.0 BENIGN PROSTATIC HYPERPLASIA WITHOUT LOWER URINARY TRACT SYMPTOMS: ICD-10-CM

## 2018-06-18 LAB
ANION GAP SERPL CALCULATED.3IONS-SCNC: 7 MMOL/L (ref 3–11)
BUN BLD-MCNC: 23 MG/DL (ref 9–23)
BUN/CREAT SERPL: 10.5 (ref 7–25)
CALCIUM SPEC-SCNC: 9.4 MG/DL (ref 8.7–10.4)
CHLORIDE SERPL-SCNC: 103 MMOL/L (ref 99–109)
CO2 SERPL-SCNC: 28 MMOL/L (ref 20–31)
CREAT BLD-MCNC: 2.19 MG/DL (ref 0.6–1.3)
GFR SERPL CREATININE-BSD FRML MDRD: 30 ML/MIN/1.73
GLUCOSE BLD-MCNC: 108 MG/DL (ref 70–100)
POTASSIUM BLD-SCNC: 4.8 MMOL/L (ref 3.5–5.5)
PSA SERPL-MCNC: 0.7 NG/ML (ref 0–4)
SODIUM BLD-SCNC: 138 MMOL/L (ref 132–146)

## 2018-06-18 PROCEDURE — 36415 COLL VENOUS BLD VENIPUNCTURE: CPT | Performed by: FAMILY MEDICINE

## 2018-06-18 PROCEDURE — 99213 OFFICE O/P EST LOW 20 MIN: CPT | Performed by: FAMILY MEDICINE

## 2018-06-18 PROCEDURE — 84153 ASSAY OF PSA TOTAL: CPT | Performed by: FAMILY MEDICINE

## 2018-06-18 PROCEDURE — 80048 BASIC METABOLIC PNL TOTAL CA: CPT | Performed by: FAMILY MEDICINE

## 2018-06-18 NOTE — PROGRESS NOTES
"Subjective   Ubaldo Mcleod is a 67 y.o. male.     History of Present Illness   The patient is here for a follow up on Hypertension.    States he is doing better.  Denies any chest pain or shortness of breath.    BP today is 110/70.  Taking Losartan 50 mg daily.  Recent creatinine recheck reveal that it had fallen from 2.1 down to 1.6.  He's drinking more fluids and avoiding anti-inflammatory medications.  We will need to recheck today.    The following portions of the patient's history were reviewed and updated as appropriate: allergies, current medications, past social history and problem list.    Review of Systems   Constitutional: Positive for fatigue.   Respiratory: Negative for shortness of breath.    Cardiovascular: Negative for chest pain.       Objective   /70   Pulse 101   Temp 98.7 °F (37.1 °C) (Tympanic)   Resp 18   Ht 177.8 cm (70\")   Wt 76.2 kg (168 lb)   SpO2 96%   BMI 24.11 kg/m²   Physical Exam   Constitutional: He appears well-developed and well-nourished.   Cardiovascular: Normal rate and regular rhythm.    No murmur heard.  Pulmonary/Chest: Effort normal and breath sounds normal. He has no wheezes. He has no rales.   Abdominal: He exhibits no distension. There is no tenderness. There is no guarding.   Skin:   Hydration remains suboptimal   Nursing note and vitals reviewed.      Assessment/Plan   Problem List Items Addressed This Visit        Cardiovascular and Mediastinum    Hypertension - Primary       Genitourinary    Benign prostatic hypertrophy      Other Visit Diagnoses     Elevated serum creatinine                    Drink plenty fluids. At least 6 glasses per day.    Continue medications as doing.    Check a Creatinine and PSA. Report results by letter.    Follow up in 3 months.              Scribed for Dr Glenn Costa by Rohini Muller CMA.          I, Glenn Costa MD, personally performed the services described in this documentation, as scribed by Rohini Mullre in my " presence, and is both accurate and complete.

## 2018-06-22 RX ORDER — AMLODIPINE BESYLATE 5 MG/1
5 TABLET ORAL DAILY
Qty: 30 TABLET | Refills: 2 | Status: SHIPPED | OUTPATIENT
Start: 2018-06-22 | End: 2018-09-17 | Stop reason: SDUPTHER

## 2018-09-17 ENCOUNTER — OFFICE VISIT (OUTPATIENT)
Dept: FAMILY MEDICINE CLINIC | Facility: CLINIC | Age: 68
End: 2018-09-17

## 2018-09-17 VITALS
SYSTOLIC BLOOD PRESSURE: 120 MMHG | TEMPERATURE: 98.3 F | OXYGEN SATURATION: 96 % | HEART RATE: 105 BPM | WEIGHT: 167 LBS | RESPIRATION RATE: 22 BRPM | DIASTOLIC BLOOD PRESSURE: 76 MMHG | BODY MASS INDEX: 23.91 KG/M2 | HEIGHT: 70 IN

## 2018-09-17 DIAGNOSIS — Z87.891 PERSONAL HISTORY OF TOBACCO USE, PRESENTING HAZARDS TO HEALTH: ICD-10-CM

## 2018-09-17 DIAGNOSIS — H54.62 VISION LOSS OF LEFT EYE: ICD-10-CM

## 2018-09-17 DIAGNOSIS — Z23 IMMUNIZATION DUE: ICD-10-CM

## 2018-09-17 DIAGNOSIS — I10 ESSENTIAL HYPERTENSION: Primary | ICD-10-CM

## 2018-09-17 DIAGNOSIS — E53.8 VITAMIN B 12 DEFICIENCY: ICD-10-CM

## 2018-09-17 DIAGNOSIS — R60.0 BILATERAL EDEMA OF LOWER EXTREMITY: ICD-10-CM

## 2018-09-17 PROCEDURE — G0009 ADMIN PNEUMOCOCCAL VACCINE: HCPCS | Performed by: FAMILY MEDICINE

## 2018-09-17 PROCEDURE — 99214 OFFICE O/P EST MOD 30 MIN: CPT | Performed by: FAMILY MEDICINE

## 2018-09-17 PROCEDURE — 90670 PCV13 VACCINE IM: CPT | Performed by: FAMILY MEDICINE

## 2018-09-17 RX ORDER — AMLODIPINE BESYLATE 5 MG/1
TABLET ORAL
Qty: 30 TABLET | Refills: 2 | Status: SHIPPED | OUTPATIENT
Start: 2018-09-17 | End: 2019-01-06 | Stop reason: SDUPTHER

## 2018-09-17 RX ORDER — TOBRAMYCIN AND DEXAMETHASONE 3; 1 MG/ML; MG/ML
SUSPENSION/ DROPS OPHTHALMIC
Refills: 0 | COMMUNITY
Start: 2018-08-31 | End: 2019-05-07

## 2018-09-17 RX ORDER — CYANOCOBALAMIN 1000 UG/ML
1000 INJECTION, SOLUTION INTRAMUSCULAR; SUBCUTANEOUS
Qty: 10 ML | Refills: 2 | Status: SHIPPED | OUTPATIENT
Start: 2018-09-17 | End: 2019-09-29 | Stop reason: SDUPTHER

## 2018-09-17 NOTE — PROGRESS NOTES
"Subjective   Ubaldo Mcleod is a 67 y.o. male.     History of Present Illness   The patient is here for a follow up on Hypertension.     States he is doing well.  Denies any chest pain or shortness of breath.       BP today is 110/70.  Taking Amlodipine 5 mg daily.      States he had lens replacement in both eyes at [facility] and states he has had 6 surgeries on the left eye. States the vision is still blurry in the left.      The following portions of the patient's history were reviewed and updated as appropriate: allergies, current medications, past social history and problem list.    Review of Systems   Eyes: Positive for redness (left eye) and visual disturbance (left eye blurry and decreased vision).   Respiratory: Negative for shortness of breath.    Cardiovascular: Positive for leg swelling. Negative for chest pain.   Endocrine: Positive for cold intolerance (feet).   Skin:        Skin lesion of the right forearm.   Neurological: Positive for numbness (and tingling of feet).       Objective   /76   Pulse 105   Temp 98.3 °F (36.8 °C) (Tympanic)   Resp 22   Ht 177.8 cm (70\")   Wt 75.8 kg (167 lb)   SpO2 96%   BMI 23.96 kg/m²   Physical Exam   Constitutional: He appears well-developed and well-nourished.   Cardiovascular: Normal rate and regular rhythm.    Pulmonary/Chest: Effort normal and breath sounds normal.   Musculoskeletal: He exhibits edema.   Nursing note and vitals reviewed.      Assessment/Plan   Problem List Items Addressed This Visit        Cardiovascular and Mediastinum    Hypertension - Primary      Other Visit Diagnoses     Vision loss of left eye        Vitamin B 12 deficiency        Bilateral edema of lower extremity        Immunization due        Personal history of tobacco use, presenting hazards to health                  Drink plenty fluids.    Continue medications as doing.    Rx for Cyanocobalamin 1000 mcg/ml 1 ML every 30 days #10 ML +2.    Given a Prevnar 13 " vaccine.    Schedule for a low dose CT scan and a Venous duplex of both lower extremities.    Follow up in 6 months.                Scribed for Dr Glenn Costa by Rohini Muller CMA.          I, Glenn Costa MD, personally performed the services described in this documentation, as scribed by Rohini Muller in my presence, and is both accurate and complete.

## 2018-09-27 ENCOUNTER — HOSPITAL ENCOUNTER (OUTPATIENT)
Dept: CT IMAGING | Facility: HOSPITAL | Age: 68
Discharge: HOME | End: 2018-09-27
Attending: FAMILY MEDICINE

## 2018-09-27 ENCOUNTER — HOSPITAL ENCOUNTER (OUTPATIENT)
Dept: CARDIOLOGY | Facility: HOSPITAL | Age: 68
Discharge: HOME | End: 2018-09-27
Attending: FAMILY MEDICINE | Admitting: FAMILY MEDICINE

## 2018-09-27 VITALS — WEIGHT: 167.11 LBS | BODY MASS INDEX: 23.92 KG/M2 | HEIGHT: 70 IN

## 2018-09-27 DIAGNOSIS — Z87.891 PERSONAL HISTORY OF TOBACCO USE, PRESENTING HAZARDS TO HEALTH: ICD-10-CM

## 2018-09-27 DIAGNOSIS — R60.0 BILATERAL EDEMA OF LOWER EXTREMITY: ICD-10-CM

## 2018-09-27 LAB
BH CV LOWER VASCULAR LEFT COMMON FEMORAL AUGMENT: NORMAL
BH CV LOWER VASCULAR LEFT COMMON FEMORAL COMPETENT: NORMAL
BH CV LOWER VASCULAR LEFT COMMON FEMORAL COMPRESS: NORMAL
BH CV LOWER VASCULAR LEFT COMMON FEMORAL PHASIC: NORMAL
BH CV LOWER VASCULAR LEFT COMMON FEMORAL SPONT: NORMAL
BH CV LOWER VASCULAR LEFT DISTAL FEMORAL AUGMENT: NORMAL
BH CV LOWER VASCULAR LEFT DISTAL FEMORAL COMPETENT: NORMAL
BH CV LOWER VASCULAR LEFT DISTAL FEMORAL COMPRESS: NORMAL
BH CV LOWER VASCULAR LEFT DISTAL FEMORAL PHASIC: NORMAL
BH CV LOWER VASCULAR LEFT DISTAL FEMORAL SPONT: NORMAL
BH CV LOWER VASCULAR LEFT GASTRONEMIUS COMPRESS: NORMAL
BH CV LOWER VASCULAR LEFT GREATER SAPH AK COMPRESS: NORMAL
BH CV LOWER VASCULAR LEFT GREATER SAPH BK COMPRESS: NORMAL
BH CV LOWER VASCULAR LEFT LESSER SAPH COMPRESS: NORMAL
BH CV LOWER VASCULAR LEFT MID FEMORAL AUGMENT: NORMAL
BH CV LOWER VASCULAR LEFT MID FEMORAL COMPETENT: NORMAL
BH CV LOWER VASCULAR LEFT MID FEMORAL COMPRESS: NORMAL
BH CV LOWER VASCULAR LEFT MID FEMORAL PHASIC: NORMAL
BH CV LOWER VASCULAR LEFT MID FEMORAL SPONT: NORMAL
BH CV LOWER VASCULAR LEFT PERONEAL COMPRESS: NORMAL
BH CV LOWER VASCULAR LEFT POPLITEAL AUGMENT: NORMAL
BH CV LOWER VASCULAR LEFT POPLITEAL COMPETENT: NORMAL
BH CV LOWER VASCULAR LEFT POPLITEAL COMPRESS: NORMAL
BH CV LOWER VASCULAR LEFT POPLITEAL PHASIC: NORMAL
BH CV LOWER VASCULAR LEFT POPLITEAL SPONT: NORMAL
BH CV LOWER VASCULAR LEFT POSTERIOR TIBIAL COMPRESS: NORMAL
BH CV LOWER VASCULAR LEFT PROFUNDA FEMORAL AUGMENT: NORMAL
BH CV LOWER VASCULAR LEFT PROFUNDA FEMORAL COMPETENT: NORMAL
BH CV LOWER VASCULAR LEFT PROFUNDA FEMORAL COMPRESS: NORMAL
BH CV LOWER VASCULAR LEFT PROFUNDA FEMORAL PHASIC: NORMAL
BH CV LOWER VASCULAR LEFT PROFUNDA FEMORAL SPONT: NORMAL
BH CV LOWER VASCULAR LEFT PROXIMAL FEMORAL AUGMENT: NORMAL
BH CV LOWER VASCULAR LEFT PROXIMAL FEMORAL COMPETENT: NORMAL
BH CV LOWER VASCULAR LEFT PROXIMAL FEMORAL COMPRESS: NORMAL
BH CV LOWER VASCULAR LEFT PROXIMAL FEMORAL PHASIC: NORMAL
BH CV LOWER VASCULAR LEFT PROXIMAL FEMORAL SPONT: NORMAL
BH CV LOWER VASCULAR LEFT SAPHENOFEMORAL JUNCTION COMPRESS: NORMAL
BH CV LOWER VASCULAR RIGHT COMMON FEMORAL AUGMENT: NORMAL
BH CV LOWER VASCULAR RIGHT COMMON FEMORAL COMPETENT: NORMAL
BH CV LOWER VASCULAR RIGHT COMMON FEMORAL COMPRESS: NORMAL
BH CV LOWER VASCULAR RIGHT COMMON FEMORAL PHASIC: NORMAL
BH CV LOWER VASCULAR RIGHT COMMON FEMORAL SPONT: NORMAL
BH CV LOWER VASCULAR RIGHT DISTAL FEMORAL AUGMENT: NORMAL
BH CV LOWER VASCULAR RIGHT DISTAL FEMORAL COMPETENT: NORMAL
BH CV LOWER VASCULAR RIGHT DISTAL FEMORAL COMPRESS: NORMAL
BH CV LOWER VASCULAR RIGHT DISTAL FEMORAL PHASIC: NORMAL
BH CV LOWER VASCULAR RIGHT DISTAL FEMORAL SPONT: NORMAL
BH CV LOWER VASCULAR RIGHT GASTRONEMIUS COMPRESS: NORMAL
BH CV LOWER VASCULAR RIGHT GREATER SAPH AK COMPRESS: NORMAL
BH CV LOWER VASCULAR RIGHT GREATER SAPH BK COMPRESS: NORMAL
BH CV LOWER VASCULAR RIGHT LESSER SAPH COMPRESS: NORMAL
BH CV LOWER VASCULAR RIGHT MID FEMORAL AUGMENT: NORMAL
BH CV LOWER VASCULAR RIGHT MID FEMORAL COMPETENT: NORMAL
BH CV LOWER VASCULAR RIGHT MID FEMORAL COMPRESS: NORMAL
BH CV LOWER VASCULAR RIGHT MID FEMORAL PHASIC: NORMAL
BH CV LOWER VASCULAR RIGHT MID FEMORAL SPONT: NORMAL
BH CV LOWER VASCULAR RIGHT PERONEAL COMPRESS: NORMAL
BH CV LOWER VASCULAR RIGHT POPLITEAL AUGMENT: NORMAL
BH CV LOWER VASCULAR RIGHT POPLITEAL COMPETENT: NORMAL
BH CV LOWER VASCULAR RIGHT POPLITEAL COMPRESS: NORMAL
BH CV LOWER VASCULAR RIGHT POPLITEAL PHASIC: NORMAL
BH CV LOWER VASCULAR RIGHT POPLITEAL SPONT: NORMAL
BH CV LOWER VASCULAR RIGHT POSTERIOR TIBIAL COMPRESS: NORMAL
BH CV LOWER VASCULAR RIGHT PROFUNDA FEMORAL COMPRESS: NORMAL
BH CV LOWER VASCULAR RIGHT PROXIMAL FEMORAL AUGMENT: NORMAL
BH CV LOWER VASCULAR RIGHT PROXIMAL FEMORAL COMPETENT: NORMAL
BH CV LOWER VASCULAR RIGHT PROXIMAL FEMORAL COMPRESS: NORMAL
BH CV LOWER VASCULAR RIGHT PROXIMAL FEMORAL PHASIC: NORMAL
BH CV LOWER VASCULAR RIGHT PROXIMAL FEMORAL SPONT: NORMAL
BH CV LOWER VASCULAR RIGHT SAPHENOFEMORAL JUNCTION COMPRESS: NORMAL

## 2018-09-27 PROCEDURE — 93970 EXTREMITY STUDY: CPT

## 2018-09-27 PROCEDURE — G0297 LDCT FOR LUNG CA SCREEN: HCPCS

## 2019-01-07 RX ORDER — AMLODIPINE BESYLATE 5 MG/1
TABLET ORAL
Qty: 30 TABLET | Refills: 2 | Status: SHIPPED | OUTPATIENT
Start: 2019-01-07 | End: 2021-02-01 | Stop reason: HOSPADM

## 2019-05-07 ENCOUNTER — OFFICE VISIT (OUTPATIENT)
Dept: FAMILY MEDICINE CLINIC | Facility: CLINIC | Age: 69
End: 2019-05-07

## 2019-05-07 VITALS
DIASTOLIC BLOOD PRESSURE: 80 MMHG | RESPIRATION RATE: 25 BRPM | SYSTOLIC BLOOD PRESSURE: 110 MMHG | OXYGEN SATURATION: 95 % | BODY MASS INDEX: 23.19 KG/M2 | HEART RATE: 103 BPM | HEIGHT: 70 IN | WEIGHT: 162 LBS | TEMPERATURE: 98.9 F

## 2019-05-07 DIAGNOSIS — Z00.00 MEDICARE ANNUAL WELLNESS VISIT, SUBSEQUENT: Primary | ICD-10-CM

## 2019-05-07 DIAGNOSIS — N40.1 BENIGN PROSTATIC HYPERPLASIA WITH URINARY FREQUENCY: ICD-10-CM

## 2019-05-07 DIAGNOSIS — R35.0 BENIGN PROSTATIC HYPERPLASIA WITH URINARY FREQUENCY: ICD-10-CM

## 2019-05-07 PROBLEM — N18.30 STAGE 3 CHRONIC KIDNEY DISEASE: Status: ACTIVE | Noted: 2019-05-07

## 2019-05-07 PROCEDURE — G0439 PPPS, SUBSEQ VISIT: HCPCS | Performed by: FAMILY MEDICINE

## 2019-05-07 RX ORDER — TAMSULOSIN HYDROCHLORIDE 0.4 MG/1
1 CAPSULE ORAL NIGHTLY
Qty: 30 CAPSULE | Refills: 5 | Status: SHIPPED | OUTPATIENT
Start: 2019-05-07 | End: 2019-08-08 | Stop reason: SDUPTHER

## 2019-05-07 NOTE — PROGRESS NOTES
QUICK REFERENCE INFORMATION:  The ABCs of the Annual Wellness Visit    Subsequent Medicare Wellness Visit     HEALTH RISK ASSESSMENT    : 1950    Recent Hospitalizations:  No hospitalization(s) within the last year..  ccc      Current Medical Providers:  Patient Care Team:  Glenn Costa MD as PCP - General  Glenn Costa MD as PCP - Claims Attributed  Dr Briceño - Ophthalmology.  Dr Barnett - Ophthalmology.  Dr Villalobos - Cardiology.  Dr Pratt - Gastroenterology.   Dr Cunningham - Neurology.      Smoking Status:  Social History     Tobacco Use   Smoking Status Current Every Day Smoker   • Packs/day: 1.50   • Years: 50.00   • Pack years: 75.00   • Types: Cigarettes   Smokeless Tobacco Never Used   Tobacco Comment     · 1 - 1 1/2 PACKS PER DAY       Alcohol Consumption:  Social History     Substance and Sexual Activity   Alcohol Use Yes    Comment: 2 bottles of bourbon a week       Depression Screen:   PHQ-2/PHQ-9 Depression Screening 2019   Little interest or pleasure in doing things 0   Feeling down, depressed, or hopeless 0   Total Score 0       Health Habits and Functional and Cognitive Screening:  Functional & Cognitive Status 2019   Do you have difficulty preparing food and eating? No   Do you have difficulty bathing yourself, getting dressed or grooming yourself? No   Do you have difficulty using the toilet? No   Do you have difficulty moving around from place to place? No   Do you have trouble with steps or getting out of a bed or a chair? Yes   In the past year have you fallen or experienced a near fall? Yes   Current Diet Well Balanced Diet   Dental Exam Not up to date   Eye Exam Not up to date   Exercise (times per week) 0 times per week   Current Exercise Activities Include None   Do you need help using the phone?  No   Are you deaf or do you have serious difficulty hearing?  No   Do you need help with transportation? No   Do you need help shopping? No   Do you need help preparing  meals?  No   Do you need help with housework?  No   Do you need help with laundry? No   Do you need help taking your medications? No   Do you need help managing money? No   Do you ever drive or ride in a car without wearing a seat belt? Yes       Mini cog test administered. Remembered all three words.  Clock drawing test is normal.    Does the patient have evidence of cognitive impairment? No    Asiprin use counseling: Does not need ASA (and currently is not on it)      Recent Lab Results:          Lab Results   Component Value Date    CHOL 184 05/21/2018    TRIG 59 05/21/2018    HDL 85 (H) 05/21/2018           Age-appropriate Screening Schedule:  Refer to the list below for future screening recommendations based on patient's age, sex and/or medical conditions. Orders for these recommended tests are listed in the plan section. The patient has been provided with a written plan.    Health Maintenance   Topic Date Due   • TDAP/TD VACCINES (1 - Tdap) 12/18/1969   • ZOSTER VACCINE (1 of 2) 12/18/2000   • INFLUENZA VACCINE  08/01/2019   • COLONOSCOPY  12/27/2026   • PNEUMOCOCCAL VACCINES (65+ LOW/MEDIUM RISK)  Completed        Subjective   History of Present Illness    Ubaldo Mcleod is a 68 y.o. male who presents for an Annual Wellness Visit.    The following portions of the patient's history were reviewed and updated as appropriate: allergies, current medications, past family history, past medical history, past social history, past surgical history and problem list.    Outpatient Medications Prior to Visit   Medication Sig Dispense Refill   • amLODIPine (NORVASC) 5 MG tablet take 1 tablet by mouth once daily 30 tablet 2   • cyanocobalamin 1000 MCG/ML injection Inject 1 mL into the appropriate muscle as directed by prescriber Every 30 (Thirty) Days. 10 mL 2   • folic acid (FOLVITE) 1 MG tablet Take 1 tablet by mouth Daily. 30 tablet 11   • pantoprazole (PROTONIX) 40 MG EC tablet Take 1 tablet by mouth 2 (Two) Times  "a Day. 60 tablet 11   • Syringe 25G X 1\" 3 ML misc Use one each month to inject vitamin B 12. 10 each 1   • tobramycin-dexamethasone (TOBRADEX) 0.3-0.1 % ophthalmic suspension ONE DROP TO LEFT EYE FOUR TIMES A DAY  0     No facility-administered medications prior to visit.        Patient Active Problem List   Diagnosis   • Marroquin's esophagus   • Enlarged prostate   • Hemochromatosis   • Hypertension   • Lacunar infarction   • Peptic ulcer   • Cobalamin deficiency   • Welcome to Medicare preventive visit   • Chronic hepatitis C with cirrhosis (CMS/HCC)   • Benign prostatic hypertrophy   • Hyperbilirubinemia   • Porphyria cutanea tarda (CMS/HCC)   • Memory loss       Advance Care Planning:  Patient does not have an advance directive - information provided to the patient today    Identification of Risk Factors:  Risk factors include: weight , inactivity, tobacco use, alcohol use and vision limitations.   Cardiovascular risk were calculated and 10 year risk found to be 13 %.    Review of Systems    Compared to one year ago, the patient feels his physical health is worse.  Compared to one year ago, the patient feels his mental health is the same.    Objective     Physical Exam    Vitals:    05/07/19 0946   BP: 110/80   Pulse: 103   Resp: 25   Temp: 98.9 °F (37.2 °C)   SpO2: 95%   Weight: 73.5 kg (162 lb)   Height: 177.8 cm (70\")   PainSc: 0-No pain       Patient's Body mass index is 23.24 kg/m². BMI is above normal parameters. Recommendations include: exercise counseling and nutrition counseling.      Assessment/Plan   Patient Self-Management and Personalized Health Advice  The patient has been provided with information about: diet, exercise, weight management, tobacco cessation, alcohol use and designing advance directives and preventive services including:   · Advance directive, Alcohol use counseling completed, Exercise counseling provided, Fall Risk assessment done, Nutrition counseling provided, Smoking cessation " "counseling completed.    Visit Diagnoses:  No diagnosis found.    No orders of the defined types were placed in this encounter.      Outpatient Encounter Medications as of 5/7/2019   Medication Sig Dispense Refill   • amLODIPine (NORVASC) 5 MG tablet take 1 tablet by mouth once daily 30 tablet 2   • cyanocobalamin 1000 MCG/ML injection Inject 1 mL into the appropriate muscle as directed by prescriber Every 30 (Thirty) Days. 10 mL 2   • folic acid (FOLVITE) 1 MG tablet Take 1 tablet by mouth Daily. 30 tablet 11   • pantoprazole (PROTONIX) 40 MG EC tablet Take 1 tablet by mouth 2 (Two) Times a Day. 60 tablet 11   • Syringe 25G X 1\" 3 ML misc Use one each month to inject vitamin B 12. 10 each 1   • [DISCONTINUED] tobramycin-dexamethasone (TOBRADEX) 0.3-0.1 % ophthalmic suspension ONE DROP TO LEFT EYE FOUR TIMES A DAY  0     No facility-administered encounter medications on file as of 5/7/2019.        Reviewed use of high risk medication in the elderly: yes  Reviewed for potential of harmful drug interactions in the elderly: yes    Follow Up:  No Follow-up on file.     Has a prior history of hepatic cirrhosis secondary to hepatitis C.  He is been treated with antiviral therapy and is now clear of the hepatitis C.  He does experience dependent edema of both lower extremities which I believe is secondary to peripheral venous insufficiency related to his cirrhosis.  He continues to smoke a pack a day and uses up to one third of 1/5 of bourbon each night.  He states that he is eating but his nutritional status remains suboptimal.  He also has a lost the vision in his left eye and is under the care of an ophthalmologist and retinologist.    His blood pressure remains low.  We will have him go ahead and discontinue the amlodipine.  He does have symptoms of bladder obstruction secondary to prostate disease.  His previous PSA was quite normal.  We will add tamsulosin 0.4 mg at night for that problem.    Finally he does have a " history of renal insufficiency.  His last creatinine was 2.  We will have him return to see us in 3 months at which time we will check on his blood pressure and prostate as well as his laboratory studies.    An After Visit Summary and PPPS with all of these plans were given to the patient.          Drink plenty fluids.    Discontinue Amlodipine.    Add Tamsulosin 0.4 mg nightly #30+5.    Continue other medications as doing.    Follow up in 3 month fasting for general lab studies.                  Scribed for Dr Glenn Costa by Rohini Muller CMA.            I, Glenn Costa MD, personally performed the services described in this documentation, as scribed by Rohini Muller in my presence, and is both accurate and complete.        (Please note that portions of this note were completed with a voice recognition program. Efforts were made to edit the dictations,but occasionally words are mis transcribed.)

## 2019-06-03 DIAGNOSIS — K22.70 BARRETT'S ESOPHAGUS WITHOUT DYSPLASIA: ICD-10-CM

## 2019-06-03 RX ORDER — PANTOPRAZOLE SODIUM 40 MG/1
TABLET, DELAYED RELEASE ORAL
Qty: 60 TABLET | Refills: 2 | Status: SHIPPED | OUTPATIENT
Start: 2019-06-03 | End: 2019-09-02 | Stop reason: SDUPTHER

## 2019-06-05 DIAGNOSIS — E53.8 VITAMIN B 12 DEFICIENCY: ICD-10-CM

## 2019-07-02 DIAGNOSIS — E83.118 OTHER HEMOCHROMATOSIS: ICD-10-CM

## 2019-07-02 RX ORDER — FOLIC ACID 1 MG/1
TABLET ORAL
Qty: 30 TABLET | Refills: 3 | Status: SHIPPED | OUTPATIENT
Start: 2019-07-02 | End: 2019-08-08 | Stop reason: SDUPTHER

## 2019-08-08 ENCOUNTER — OFFICE VISIT (OUTPATIENT)
Dept: FAMILY MEDICINE CLINIC | Facility: CLINIC | Age: 69
End: 2019-08-08

## 2019-08-08 VITALS
OXYGEN SATURATION: 96 % | HEART RATE: 110 BPM | BODY MASS INDEX: 23.34 KG/M2 | TEMPERATURE: 98 F | HEIGHT: 70 IN | SYSTOLIC BLOOD PRESSURE: 110 MMHG | DIASTOLIC BLOOD PRESSURE: 80 MMHG | WEIGHT: 163 LBS | RESPIRATION RATE: 27 BRPM

## 2019-08-08 DIAGNOSIS — M54.50 ACUTE BILATERAL LOW BACK PAIN WITHOUT SCIATICA: ICD-10-CM

## 2019-08-08 DIAGNOSIS — Z12.5 SCREENING FOR PROSTATE CANCER: ICD-10-CM

## 2019-08-08 DIAGNOSIS — E83.118 OTHER HEMOCHROMATOSIS: ICD-10-CM

## 2019-08-08 DIAGNOSIS — N40.1 BENIGN PROSTATIC HYPERPLASIA WITH URINARY FREQUENCY: ICD-10-CM

## 2019-08-08 DIAGNOSIS — E53.8 VITAMIN B 12 DEFICIENCY: ICD-10-CM

## 2019-08-08 DIAGNOSIS — K22.70 BARRETT'S ESOPHAGUS WITHOUT DYSPLASIA: ICD-10-CM

## 2019-08-08 DIAGNOSIS — R35.0 BENIGN PROSTATIC HYPERPLASIA WITH URINARY FREQUENCY: ICD-10-CM

## 2019-08-08 DIAGNOSIS — R10.13 EPIGASTRIC PAIN: Primary | ICD-10-CM

## 2019-08-08 DIAGNOSIS — N28.9 RENAL INSUFFICIENCY: ICD-10-CM

## 2019-08-08 PROCEDURE — 85025 COMPLETE CBC W/AUTO DIFF WBC: CPT | Performed by: FAMILY MEDICINE

## 2019-08-08 PROCEDURE — 99214 OFFICE O/P EST MOD 30 MIN: CPT | Performed by: FAMILY MEDICINE

## 2019-08-08 PROCEDURE — G0103 PSA SCREENING: HCPCS | Performed by: FAMILY MEDICINE

## 2019-08-08 PROCEDURE — 82607 VITAMIN B-12: CPT | Performed by: FAMILY MEDICINE

## 2019-08-08 PROCEDURE — 80053 COMPREHEN METABOLIC PANEL: CPT | Performed by: FAMILY MEDICINE

## 2019-08-08 RX ORDER — TAMSULOSIN HYDROCHLORIDE 0.4 MG/1
1 CAPSULE ORAL NIGHTLY
Qty: 90 CAPSULE | Refills: 3 | Status: SHIPPED | OUTPATIENT
Start: 2019-08-08 | End: 2020-10-26

## 2019-08-08 RX ORDER — FOLIC ACID 1 MG/1
1000 TABLET ORAL DAILY
Qty: 90 TABLET | Refills: 3 | Status: SHIPPED | OUTPATIENT
Start: 2019-08-08 | End: 2020-08-18

## 2019-08-08 RX ORDER — CYCLOBENZAPRINE HCL 10 MG
10 TABLET ORAL DAILY PRN
Qty: 10 TABLET | Refills: 2 | Status: SHIPPED | OUTPATIENT
Start: 2019-08-08 | End: 2019-09-21 | Stop reason: SDUPTHER

## 2019-08-08 NOTE — PROGRESS NOTES
"Subjective   Ubaldo Mcleod is a 68 y.o. male seen today for Hypertension; Urinary Frequency; and Back Pain.     History of Present Illness   The patient is here for a follow up on Hypertension.    States he is doing good.   Went to [place] on vacation and since then he is having some back pain and urinary frequency.  Also some epigastric pain. Taking Pantoprazole 40 mg twice a day.  Denies any chest pain or shortness of breath.    BP today is 110/80.  Taking Amlodipine 5 mg daily.    The following portions of the patient's history were reviewed and updated as appropriate: allergies, current medications, past social history and problem list.    Review of Systems   Respiratory: Negative for shortness of breath.    Cardiovascular: Negative for chest pain.   Gastrointestinal: Positive for abdominal pain (epigastric).   Genitourinary: Positive for frequency.   Musculoskeletal: Positive for back pain.       Objective   /80   Pulse 110   Temp 98 °F (36.7 °C)   Resp 27   Ht 177.8 cm (70\")   Wt 73.9 kg (163 lb)   SpO2 96%   BMI 23.39 kg/m²   Physical Exam   Constitutional: He appears well-developed and well-nourished.   Cardiovascular: Normal rate and regular rhythm.   Pulmonary/Chest: Effort normal and breath sounds normal.   Abdominal: Soft. Bowel sounds are normal.   Nursing note and vitals reviewed.      Assessment/Plan   Problem List Items Addressed This Visit        Digestive    Marroquin's esophagus    Hemochromatosis       Genitourinary    Benign prostatic hypertrophy      Other Visit Diagnoses     Epigastric pain    -  Primary    Acute bilateral low back pain without sciatica        Renal insufficiency        Vitamin B 12 deficiency        Screening for prostate cancer                  Drink plenty fluids.    Continue medications as doing.    Refill Tamsulosin 0.4 mg #90+3., Folic acid daily #90+3.  Rx for Cyclobenzaprine 10 mg as needed #10+2.    Check a CBC,CMP, PSA, and Vitamin B 12. Report " results by letter.    Refer to Gastroenterology. Dr Pratt.    Follow up as needed.                    Scribed for Dr Glenn Costa by Rohini Muller CMA.          I, Glenn Costa MD, personally performed the services described in this documentation, as scribed by Rohini Muller in my presence, and is both accurate and complete.        (Please note that portions of this note were completed with a voice recognition program. Efforts were made to edit the dictations,but occasionally words are mis transcribed.)

## 2019-08-09 LAB
ALBUMIN SERPL-MCNC: 3.9 G/DL (ref 3.5–5.2)
ALBUMIN/GLOB SERPL: 1.3 G/DL
ALP SERPL-CCNC: 319 U/L (ref 39–117)
ALT SERPL W P-5'-P-CCNC: 25 U/L (ref 1–41)
ANION GAP SERPL CALCULATED.3IONS-SCNC: 13.6 MMOL/L (ref 5–15)
AST SERPL-CCNC: 40 U/L (ref 1–40)
BASOPHILS # BLD AUTO: 0.05 10*3/MM3 (ref 0–0.2)
BASOPHILS NFR BLD AUTO: 0.6 % (ref 0–1.5)
BILIRUB SERPL-MCNC: 3 MG/DL (ref 0.2–1.2)
BUN BLD-MCNC: 8 MG/DL (ref 8–23)
BUN/CREAT SERPL: 9.2 (ref 7–25)
CALCIUM SPEC-SCNC: 9.8 MG/DL (ref 8.6–10.5)
CHLORIDE SERPL-SCNC: 99 MMOL/L (ref 98–107)
CO2 SERPL-SCNC: 26.4 MMOL/L (ref 22–29)
CREAT BLD-MCNC: 0.87 MG/DL (ref 0.76–1.27)
DEPRECATED RDW RBC AUTO: 60.9 FL (ref 37–54)
EOSINOPHIL # BLD AUTO: 0.06 10*3/MM3 (ref 0–0.4)
EOSINOPHIL NFR BLD AUTO: 0.7 % (ref 0.3–6.2)
ERYTHROCYTE [DISTWIDTH] IN BLOOD BY AUTOMATED COUNT: 15.9 % (ref 12.3–15.4)
GFR SERPL CREATININE-BSD FRML MDRD: 87 ML/MIN/1.73
GLOBULIN UR ELPH-MCNC: 3.1 GM/DL
GLUCOSE BLD-MCNC: 95 MG/DL (ref 65–99)
HCT VFR BLD AUTO: 51.1 % (ref 37.5–51)
HGB BLD-MCNC: 15.7 G/DL (ref 13–17.7)
IMM GRANULOCYTES # BLD AUTO: 0.02 10*3/MM3 (ref 0–0.05)
IMM GRANULOCYTES NFR BLD AUTO: 0.2 % (ref 0–0.5)
LYMPHOCYTES # BLD AUTO: 1.71 10*3/MM3 (ref 0.7–3.1)
LYMPHOCYTES NFR BLD AUTO: 18.9 % (ref 19.6–45.3)
MCH RBC QN AUTO: 32 PG (ref 26.6–33)
MCHC RBC AUTO-ENTMCNC: 30.7 G/DL (ref 31.5–35.7)
MCV RBC AUTO: 104.1 FL (ref 79–97)
MONOCYTES # BLD AUTO: 1.12 10*3/MM3 (ref 0.1–0.9)
MONOCYTES NFR BLD AUTO: 12.4 % (ref 5–12)
NEUTROPHILS # BLD AUTO: 6.07 10*3/MM3 (ref 1.7–7)
NEUTROPHILS NFR BLD AUTO: 67.2 % (ref 42.7–76)
NRBC BLD AUTO-RTO: 0 /100 WBC (ref 0–0.2)
PLATELET # BLD AUTO: 323 10*3/MM3 (ref 140–450)
PMV BLD AUTO: 11.6 FL (ref 6–12)
POTASSIUM BLD-SCNC: 4.3 MMOL/L (ref 3.5–5.2)
PROT SERPL-MCNC: 7 G/DL (ref 6–8.5)
PSA SERPL-MCNC: 1.34 NG/ML (ref 0–4)
RBC # BLD AUTO: 4.91 10*6/MM3 (ref 4.14–5.8)
SODIUM BLD-SCNC: 139 MMOL/L (ref 136–145)
VIT B12 BLD-MCNC: 1195 PG/ML (ref 211–946)
WBC NRBC COR # BLD: 9.03 10*3/MM3 (ref 3.4–10.8)

## 2019-09-02 DIAGNOSIS — K22.70 BARRETT'S ESOPHAGUS WITHOUT DYSPLASIA: ICD-10-CM

## 2019-09-02 RX ORDER — PANTOPRAZOLE SODIUM 40 MG/1
TABLET, DELAYED RELEASE ORAL
Qty: 60 TABLET | Refills: 2 | Status: SHIPPED | OUTPATIENT
Start: 2019-09-02 | End: 2020-02-05 | Stop reason: SDUPTHER

## 2019-09-21 DIAGNOSIS — M54.50 ACUTE BILATERAL LOW BACK PAIN WITHOUT SCIATICA: ICD-10-CM

## 2019-09-23 RX ORDER — CYCLOBENZAPRINE HCL 10 MG
TABLET ORAL
Qty: 10 TABLET | Refills: 2 | Status: SHIPPED | OUTPATIENT
Start: 2019-09-23 | End: 2019-11-02 | Stop reason: SDUPTHER

## 2019-09-29 DIAGNOSIS — E53.8 VITAMIN B 12 DEFICIENCY: ICD-10-CM

## 2019-10-02 RX ORDER — CYANOCOBALAMIN 1000 UG/ML
INJECTION, SOLUTION INTRAMUSCULAR; SUBCUTANEOUS
Qty: 10 ML | Refills: 2 | Status: SHIPPED | OUTPATIENT
Start: 2019-10-02 | End: 2020-12-10

## 2019-11-02 DIAGNOSIS — M54.50 ACUTE BILATERAL LOW BACK PAIN WITHOUT SCIATICA: ICD-10-CM

## 2019-11-05 RX ORDER — CYCLOBENZAPRINE HCL 10 MG
TABLET ORAL
Qty: 10 TABLET | Refills: 2 | Status: SHIPPED | OUTPATIENT
Start: 2019-11-05 | End: 2019-12-05 | Stop reason: SDUPTHER

## 2019-12-05 DIAGNOSIS — M54.50 ACUTE BILATERAL LOW BACK PAIN WITHOUT SCIATICA: ICD-10-CM

## 2019-12-05 RX ORDER — CYCLOBENZAPRINE HCL 10 MG
TABLET ORAL
Qty: 30 TABLET | Refills: 0 | Status: SHIPPED | OUTPATIENT
Start: 2019-12-05 | End: 2019-12-31

## 2019-12-31 DIAGNOSIS — M54.50 ACUTE BILATERAL LOW BACK PAIN WITHOUT SCIATICA: ICD-10-CM

## 2019-12-31 RX ORDER — CYCLOBENZAPRINE HCL 10 MG
TABLET ORAL
Qty: 30 TABLET | Refills: 0 | Status: ON HOLD | OUTPATIENT
Start: 2019-12-31 | End: 2021-02-01 | Stop reason: SDUPTHER

## 2020-02-05 DIAGNOSIS — K22.70 BARRETT'S ESOPHAGUS WITHOUT DYSPLASIA: ICD-10-CM

## 2020-02-05 RX ORDER — PANTOPRAZOLE SODIUM 40 MG/1
TABLET, DELAYED RELEASE ORAL
Qty: 180 TABLET | Refills: 1 | Status: SHIPPED | OUTPATIENT
Start: 2020-02-05 | End: 2021-02-01 | Stop reason: HOSPADM

## 2020-02-05 RX ORDER — PANTOPRAZOLE SODIUM 40 MG/1
40 TABLET, DELAYED RELEASE ORAL 2 TIMES DAILY
Qty: 180 TABLET | Refills: 1 | Status: SHIPPED | OUTPATIENT
Start: 2020-02-05 | End: 2021-03-05

## 2020-06-20 DIAGNOSIS — E53.8 VITAMIN B 12 DEFICIENCY: ICD-10-CM

## 2020-08-07 DIAGNOSIS — E83.118 OTHER HEMOCHROMATOSIS: ICD-10-CM

## 2020-08-07 RX ORDER — FOLIC ACID 1 MG/1
TABLET ORAL
Qty: 90 TABLET | Refills: 3 | OUTPATIENT
Start: 2020-08-07

## 2020-08-17 DIAGNOSIS — E83.118 OTHER HEMOCHROMATOSIS: ICD-10-CM

## 2020-08-18 RX ORDER — FOLIC ACID 1 MG/1
TABLET ORAL
Qty: 90 TABLET | Refills: 0 | Status: SHIPPED | OUTPATIENT
Start: 2020-08-18 | End: 2020-12-10

## 2020-10-26 DIAGNOSIS — N40.1 BENIGN PROSTATIC HYPERPLASIA WITH URINARY FREQUENCY: ICD-10-CM

## 2020-10-26 DIAGNOSIS — R35.0 BENIGN PROSTATIC HYPERPLASIA WITH URINARY FREQUENCY: ICD-10-CM

## 2020-10-26 RX ORDER — TAMSULOSIN HYDROCHLORIDE 0.4 MG/1
CAPSULE ORAL
Qty: 90 CAPSULE | Refills: 0 | Status: SHIPPED | OUTPATIENT
Start: 2020-10-26 | End: 2022-01-01 | Stop reason: SDUPTHER

## 2020-12-10 DIAGNOSIS — E83.118 OTHER HEMOCHROMATOSIS: ICD-10-CM

## 2020-12-10 DIAGNOSIS — E53.8 VITAMIN B 12 DEFICIENCY: ICD-10-CM

## 2020-12-10 RX ORDER — FOLIC ACID 1 MG/1
TABLET ORAL
Qty: 90 TABLET | Refills: 3 | Status: SHIPPED | OUTPATIENT
Start: 2020-12-10 | End: 2022-01-01 | Stop reason: SDUPTHER

## 2020-12-10 RX ORDER — CYANOCOBALAMIN 1000 UG/ML
INJECTION, SOLUTION INTRAMUSCULAR; SUBCUTANEOUS
Qty: 10 ML | Refills: 2 | Status: SHIPPED | OUTPATIENT
Start: 2020-12-10 | End: 2022-01-01

## 2021-01-01 ENCOUNTER — APPOINTMENT (OUTPATIENT)
Dept: CT IMAGING | Facility: HOSPITAL | Age: 71
End: 2021-01-01

## 2021-01-01 ENCOUNTER — HOSPITAL ENCOUNTER (OUTPATIENT)
Dept: GENERAL RADIOLOGY | Facility: HOSPITAL | Age: 71
Discharge: HOME | End: 2021-10-20
Admitting: FAMILY MEDICINE

## 2021-01-01 ENCOUNTER — OFFICE VISIT (OUTPATIENT)
Dept: NEUROSURGERY | Facility: CLINIC | Age: 71
End: 2021-01-01

## 2021-01-01 ENCOUNTER — HOSPITAL ENCOUNTER (EMERGENCY)
Facility: HOSPITAL | Age: 71
Discharge: HOME | End: 2021-10-17

## 2021-01-01 ENCOUNTER — HOSPITAL ENCOUNTER (EMERGENCY)
Facility: HOSPITAL | Age: 71
Discharge: HOME | End: 2021-10-10
Attending: EMERGENCY MEDICINE | Admitting: EMERGENCY MEDICINE

## 2021-01-01 ENCOUNTER — TELEPHONE (OUTPATIENT)
Dept: FAMILY MEDICINE CLINIC | Facility: CLINIC | Age: 71
End: 2021-01-01

## 2021-01-01 ENCOUNTER — APPOINTMENT (OUTPATIENT)
Dept: GENERAL RADIOLOGY | Facility: HOSPITAL | Age: 71
End: 2021-01-01

## 2021-01-01 ENCOUNTER — HOSPITAL ENCOUNTER (OUTPATIENT)
Dept: MRI IMAGING | Facility: HOSPITAL | Age: 71
Discharge: HOME | End: 2021-10-25
Admitting: PHYSICIAN ASSISTANT

## 2021-01-01 ENCOUNTER — TELEPHONE (OUTPATIENT)
Dept: NEUROSURGERY | Facility: CLINIC | Age: 71
End: 2021-01-01

## 2021-01-01 ENCOUNTER — TELEPHONE (OUTPATIENT)
Dept: URGENT CARE | Facility: CLINIC | Age: 71
End: 2021-01-01

## 2021-01-01 ENCOUNTER — ANESTHESIA EVENT (OUTPATIENT)
Dept: PERIOP | Facility: HOSPITAL | Age: 71
End: 2021-01-01

## 2021-01-01 ENCOUNTER — ANESTHESIA EVENT CONVERTED (OUTPATIENT)
Dept: ANESTHESIOLOGY | Facility: HOSPITAL | Age: 71
End: 2021-01-01

## 2021-01-01 ENCOUNTER — OFFICE VISIT (OUTPATIENT)
Dept: FAMILY MEDICINE CLINIC | Facility: CLINIC | Age: 71
End: 2021-01-01

## 2021-01-01 ENCOUNTER — ANESTHESIA (OUTPATIENT)
Dept: PERIOP | Facility: HOSPITAL | Age: 71
End: 2021-01-01

## 2021-01-01 ENCOUNTER — HOSPITAL ENCOUNTER (INPATIENT)
Facility: HOSPITAL | Age: 71
LOS: 3 days | Discharge: NURSING, REHAB OR LONG-TERM CARE | End: 2022-01-01
Attending: EMERGENCY MEDICINE | Admitting: INTERNAL MEDICINE

## 2021-01-01 VITALS
OXYGEN SATURATION: 100 % | BODY MASS INDEX: 24.17 KG/M2 | DIASTOLIC BLOOD PRESSURE: 82 MMHG | HEIGHT: 70 IN | WEIGHT: 168.8 LBS | TEMPERATURE: 98.3 F | HEART RATE: 99 BPM | SYSTOLIC BLOOD PRESSURE: 126 MMHG

## 2021-01-01 VITALS
TEMPERATURE: 98 F | HEART RATE: 97 BPM | SYSTOLIC BLOOD PRESSURE: 132 MMHG | DIASTOLIC BLOOD PRESSURE: 86 MMHG | BODY MASS INDEX: 24.11 KG/M2 | HEIGHT: 70 IN | WEIGHT: 168.4 LBS | OXYGEN SATURATION: 100 %

## 2021-01-01 VITALS — BODY MASS INDEX: 24.34 KG/M2 | HEIGHT: 70 IN | WEIGHT: 170 LBS | RESPIRATION RATE: 18 BRPM

## 2021-01-01 VITALS
DIASTOLIC BLOOD PRESSURE: 62 MMHG | SYSTOLIC BLOOD PRESSURE: 108 MMHG | HEART RATE: 118 BPM | BODY MASS INDEX: 24.14 KG/M2 | WEIGHT: 168.6 LBS | HEIGHT: 70 IN | OXYGEN SATURATION: 99 % | RESPIRATION RATE: 20 BRPM

## 2021-01-01 VITALS
SYSTOLIC BLOOD PRESSURE: 119 MMHG | OXYGEN SATURATION: 99 % | DIASTOLIC BLOOD PRESSURE: 78 MMHG | HEIGHT: 70 IN | HEART RATE: 89 BPM | TEMPERATURE: 97.5 F | WEIGHT: 175 LBS | RESPIRATION RATE: 18 BRPM | BODY MASS INDEX: 25.05 KG/M2

## 2021-01-01 VITALS
BODY MASS INDEX: 24.34 KG/M2 | SYSTOLIC BLOOD PRESSURE: 108 MMHG | TEMPERATURE: 97.1 F | HEIGHT: 70 IN | DIASTOLIC BLOOD PRESSURE: 60 MMHG | WEIGHT: 170 LBS

## 2021-01-01 VITALS
TEMPERATURE: 96.9 F | HEIGHT: 70 IN | BODY MASS INDEX: 23.88 KG/M2 | WEIGHT: 166.8 LBS | DIASTOLIC BLOOD PRESSURE: 62 MMHG | SYSTOLIC BLOOD PRESSURE: 112 MMHG

## 2021-01-01 VITALS
DIASTOLIC BLOOD PRESSURE: 86 MMHG | SYSTOLIC BLOOD PRESSURE: 140 MMHG | OXYGEN SATURATION: 97 % | HEIGHT: 70 IN | RESPIRATION RATE: 16 BRPM | BODY MASS INDEX: 25.05 KG/M2 | TEMPERATURE: 97 F | WEIGHT: 175 LBS | HEART RATE: 106 BPM

## 2021-01-01 VITALS — BODY MASS INDEX: 24.34 KG/M2 | HEIGHT: 70 IN | WEIGHT: 170 LBS

## 2021-01-01 DIAGNOSIS — K22.70 BARRETT'S ESOPHAGUS WITHOUT DYSPLASIA: ICD-10-CM

## 2021-01-01 DIAGNOSIS — S72.002A CLOSED FRACTURE OF LEFT HIP, INITIAL ENCOUNTER: ICD-10-CM

## 2021-01-01 DIAGNOSIS — G62.9 PERIPHERAL POLYNEUROPATHY: ICD-10-CM

## 2021-01-01 DIAGNOSIS — M51.27 HERNIATION OF INTERVERTEBRAL DISC BETWEEN L5 AND S1: Primary | ICD-10-CM

## 2021-01-01 DIAGNOSIS — S22.080G COMPRESSION FRACTURE OF T12 VERTEBRA WITH DELAYED HEALING, SUBSEQUENT ENCOUNTER: Primary | ICD-10-CM

## 2021-01-01 DIAGNOSIS — B18.2 CHRONIC HEPATITIS C WITH CIRRHOSIS (HCC): ICD-10-CM
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
## 2021-01-01 DIAGNOSIS — S00.83XA FACIAL CONTUSION, INITIAL ENCOUNTER: ICD-10-CM

## 2021-01-01 DIAGNOSIS — M54.50 ACUTE BILATERAL LOW BACK PAIN WITHOUT SCIATICA: ICD-10-CM

## 2021-01-01 DIAGNOSIS — M54.50 CHRONIC BILATERAL LOW BACK PAIN WITHOUT SCIATICA: Primary | ICD-10-CM

## 2021-01-01 DIAGNOSIS — G89.29 CHRONIC BILATERAL LOW BACK PAIN WITHOUT SCIATICA: Primary | ICD-10-CM

## 2021-01-01 DIAGNOSIS — S01.81XA FACIAL LACERATION, INITIAL ENCOUNTER: Primary | ICD-10-CM

## 2021-01-01 DIAGNOSIS — S22.080A COMPRESSION FRACTURE OF T12 VERTEBRA, INITIAL ENCOUNTER (HCC): ICD-10-CM

## 2021-01-01 DIAGNOSIS — R39.15 URINARY URGENCY: ICD-10-CM

## 2021-01-01 DIAGNOSIS — M43.9 COMPRESSION DEFORMITY OF VERTEBRA: Primary | ICD-10-CM

## 2021-01-01 DIAGNOSIS — S22.070A CLOSED WEDGE COMPRESSION FRACTURE OF T9 VERTEBRA, INITIAL ENCOUNTER (HCC): Primary | ICD-10-CM

## 2021-01-01 DIAGNOSIS — S72.002A CLOSED FRACTURE OF NECK OF LEFT FEMUR, INITIAL ENCOUNTER: ICD-10-CM

## 2021-01-01 DIAGNOSIS — M25.552 LEFT HIP PAIN: ICD-10-CM

## 2021-01-01 DIAGNOSIS — M54.42 CHRONIC BILATERAL LOW BACK PAIN WITH LEFT-SIDED SCIATICA: ICD-10-CM

## 2021-01-01 DIAGNOSIS — T07.XXXA ABRASIONS OF MULTIPLE SITES: ICD-10-CM

## 2021-01-01 DIAGNOSIS — S22.080A COMPRESSION FRACTURE OF T12 VERTEBRA, INITIAL ENCOUNTER (HCC): Primary | ICD-10-CM

## 2021-01-01 DIAGNOSIS — I10 PRIMARY HYPERTENSION: ICD-10-CM

## 2021-01-01 DIAGNOSIS — M54.50 ACUTE BILATERAL LOW BACK PAIN WITHOUT SCIATICA: Primary | ICD-10-CM

## 2021-01-01 DIAGNOSIS — N39.41 URGE INCONTINENCE: ICD-10-CM

## 2021-01-01 DIAGNOSIS — S22.070G CLOSED WEDGE COMPRESSION FRACTURE OF T9 VERTEBRA WITH DELAYED HEALING, SUBSEQUENT ENCOUNTER: ICD-10-CM

## 2021-01-01 DIAGNOSIS — K74.60 CHRONIC HEPATITIS C WITH CIRRHOSIS (HCC): ICD-10-CM

## 2021-01-01 DIAGNOSIS — G89.29 CHRONIC BILATERAL LOW BACK PAIN WITH LEFT-SIDED SCIATICA: ICD-10-CM

## 2021-01-01 DIAGNOSIS — S02.2XXB OPEN FRACTURE OF NASAL BONE, INITIAL ENCOUNTER: ICD-10-CM

## 2021-01-01 DIAGNOSIS — N18.30 STAGE 3 CHRONIC KIDNEY DISEASE, UNSPECIFIED WHETHER STAGE 3A OR 3B CKD (HCC): ICD-10-CM

## 2021-01-01 LAB
ABO GROUP BLD: NORMAL
ABO GROUP BLD: NORMAL
ALBUMIN SERPL-MCNC: 3.1 G/DL (ref 3.5–5.2)
ALBUMIN SERPL-MCNC: 3.5 G/DL (ref 3.5–5.2)
ALBUMIN/GLOB SERPL: 1.3 G/DL
ALBUMIN/GLOB SERPL: 1.3 G/DL
ALP SERPL-CCNC: 403 U/L (ref 39–117)
ALP SERPL-CCNC: 497 U/L (ref 39–117)
ALT SERPL W P-5'-P-CCNC: 20 U/L (ref 1–41)
ALT SERPL W P-5'-P-CCNC: 28 U/L (ref 1–41)
ANION GAP SERPL CALCULATED.3IONS-SCNC: 10 MMOL/L (ref 5–15)
ANION GAP SERPL CALCULATED.3IONS-SCNC: 7 MMOL/L (ref 5–15)
APTT PPP: 29.1 SECONDS (ref 22–39)
AST SERPL-CCNC: 29 U/L (ref 1–40)
AST SERPL-CCNC: 52 U/L (ref 1–40)
BASOPHILS # BLD AUTO: 0.03 10*3/MM3 (ref 0–0.2)
BASOPHILS # BLD AUTO: 0.05 10*3/MM3 (ref 0–0.2)
BASOPHILS NFR BLD AUTO: 0.3 % (ref 0–1.5)
BASOPHILS NFR BLD AUTO: 0.8 % (ref 0–1.5)
BILIRUB SERPL-MCNC: 2.8 MG/DL (ref 0–1.2)
BILIRUB SERPL-MCNC: 3.1 MG/DL (ref 0–1.2)
BLD GP AB SCN SERPL QL: NEGATIVE
BUN SERPL-MCNC: 8 MG/DL (ref 8–23)
BUN SERPL-MCNC: 8 MG/DL (ref 8–23)
BUN/CREAT SERPL: 11.1 (ref 7–25)
BUN/CREAT SERPL: 9 (ref 7–25)
CALCIUM SPEC-SCNC: 8.4 MG/DL (ref 8.6–10.5)
CALCIUM SPEC-SCNC: 8.8 MG/DL (ref 8.6–10.5)
CHLORIDE SERPL-SCNC: 101 MMOL/L (ref 98–107)
CHLORIDE SERPL-SCNC: 102 MMOL/L (ref 98–107)
CO2 SERPL-SCNC: 24 MMOL/L (ref 22–29)
CO2 SERPL-SCNC: 26 MMOL/L (ref 22–29)
CREAT SERPL-MCNC: 0.72 MG/DL (ref 0.76–1.27)
CREAT SERPL-MCNC: 0.89 MG/DL (ref 0.76–1.27)
DEPRECATED RDW RBC AUTO: 60 FL (ref 37–54)
DEPRECATED RDW RBC AUTO: 60.2 FL (ref 37–54)
DEPRECATED RDW RBC AUTO: 60.5 FL (ref 37–54)
EOSINOPHIL # BLD AUTO: 0.05 10*3/MM3 (ref 0–0.4)
EOSINOPHIL # BLD AUTO: 0.06 10*3/MM3 (ref 0–0.4)
EOSINOPHIL NFR BLD AUTO: 0.6 % (ref 0.3–6.2)
EOSINOPHIL NFR BLD AUTO: 1 % (ref 0.3–6.2)
ERYTHROCYTE [DISTWIDTH] IN BLOOD BY AUTOMATED COUNT: 15.9 % (ref 12.3–15.4)
ERYTHROCYTE [DISTWIDTH] IN BLOOD BY AUTOMATED COUNT: 15.9 % (ref 12.3–15.4)
ERYTHROCYTE [DISTWIDTH] IN BLOOD BY AUTOMATED COUNT: 16.1 % (ref 12.3–15.4)
ETHANOL BLD-MCNC: 65 MG/DL (ref 0–10)
FLUAV SUBTYP SPEC NAA+PROBE: NOT DETECTED
FLUBV RNA ISLT QL NAA+PROBE: NOT DETECTED
GFR SERPL CREATININE-BSD FRML MDRD: 108 ML/MIN/1.73
GFR SERPL CREATININE-BSD FRML MDRD: 84 ML/MIN/1.73
GLOBULIN UR ELPH-MCNC: 2.4 GM/DL
GLOBULIN UR ELPH-MCNC: 2.6 GM/DL
GLUCOSE SERPL-MCNC: 111 MG/DL (ref 65–99)
GLUCOSE SERPL-MCNC: 162 MG/DL (ref 65–99)
HCT VFR BLD AUTO: 33.4 % (ref 37.5–51)
HCT VFR BLD AUTO: 34.6 % (ref 37.5–51)
HCT VFR BLD AUTO: 35 % (ref 37.5–51)
HGB BLD-MCNC: 11 G/DL (ref 13–17.7)
HGB BLD-MCNC: 11.6 G/DL (ref 13–17.7)
HGB BLD-MCNC: 11.6 G/DL (ref 13–17.7)
IMM GRANULOCYTES # BLD AUTO: 0.03 10*3/MM3 (ref 0–0.05)
IMM GRANULOCYTES # BLD AUTO: 0.03 10*3/MM3 (ref 0–0.05)
IMM GRANULOCYTES NFR BLD AUTO: 0.3 % (ref 0–0.5)
IMM GRANULOCYTES NFR BLD AUTO: 0.5 % (ref 0–0.5)
INR PPP: 0.96 (ref 0.85–1.16)
LYMPHOCYTES # BLD AUTO: 0.99 10*3/MM3 (ref 0.7–3.1)
LYMPHOCYTES # BLD AUTO: 1.29 10*3/MM3 (ref 0.7–3.1)
LYMPHOCYTES NFR BLD AUTO: 11 % (ref 19.6–45.3)
LYMPHOCYTES NFR BLD AUTO: 21.4 % (ref 19.6–45.3)
MCH RBC QN AUTO: 33.7 PG (ref 26.6–33)
MCH RBC QN AUTO: 33.8 PG (ref 26.6–33)
MCH RBC QN AUTO: 34.2 PG (ref 26.6–33)
MCHC RBC AUTO-ENTMCNC: 32.9 G/DL (ref 31.5–35.7)
MCHC RBC AUTO-ENTMCNC: 33.1 G/DL (ref 31.5–35.7)
MCHC RBC AUTO-ENTMCNC: 33.5 G/DL (ref 31.5–35.7)
MCV RBC AUTO: 100.9 FL (ref 79–97)
MCV RBC AUTO: 102.5 FL (ref 79–97)
MCV RBC AUTO: 103.2 FL (ref 79–97)
MONOCYTES # BLD AUTO: 1.12 10*3/MM3 (ref 0.1–0.9)
MONOCYTES # BLD AUTO: 1.45 10*3/MM3 (ref 0.1–0.9)
MONOCYTES NFR BLD AUTO: 16.1 % (ref 5–12)
MONOCYTES NFR BLD AUTO: 18.6 % (ref 5–12)
NEUTROPHILS NFR BLD AUTO: 3.47 10*3/MM3 (ref 1.7–7)
NEUTROPHILS NFR BLD AUTO: 57.7 % (ref 42.7–76)
NEUTROPHILS NFR BLD AUTO: 6.46 10*3/MM3 (ref 1.7–7)
NEUTROPHILS NFR BLD AUTO: 71.7 % (ref 42.7–76)
NRBC BLD AUTO-RTO: 0 /100 WBC (ref 0–0.2)
NRBC BLD AUTO-RTO: 0 /100 WBC (ref 0–0.2)
PLATELET # BLD AUTO: 201 10*3/MM3 (ref 140–450)
PLATELET # BLD AUTO: 203 10*3/MM3 (ref 140–450)
PLATELET # BLD AUTO: 228 10*3/MM3 (ref 140–450)
PMV BLD AUTO: 10.1 FL (ref 6–12)
PMV BLD AUTO: 10.3 FL (ref 6–12)
PMV BLD AUTO: 10.8 FL (ref 6–12)
POTASSIUM SERPL-SCNC: 3.9 MMOL/L (ref 3.5–5.2)
POTASSIUM SERPL-SCNC: 4.8 MMOL/L (ref 3.5–5.2)
PROT SERPL-MCNC: 5.5 G/DL (ref 6–8.5)
PROT SERPL-MCNC: 6.1 G/DL (ref 6–8.5)
PROTHROMBIN TIME: 12.5 SECONDS (ref 11.4–14.4)
QT INTERVAL: 378 MS
QTC INTERVAL: 467 MS
RBC # BLD AUTO: 3.26 10*6/MM3 (ref 4.14–5.8)
RBC # BLD AUTO: 3.39 10*6/MM3 (ref 4.14–5.8)
RBC # BLD AUTO: 3.43 10*6/MM3 (ref 4.14–5.8)
RH BLD: POSITIVE
RH BLD: POSITIVE
SARS-COV-2 RNA PNL SPEC NAA+PROBE: NOT DETECTED
SODIUM SERPL-SCNC: 135 MMOL/L (ref 136–145)
SODIUM SERPL-SCNC: 135 MMOL/L (ref 136–145)
T&S EXPIRATION DATE: NORMAL
WBC NRBC COR # BLD: 6.02 10*3/MM3 (ref 3.4–10.8)
WBC NRBC COR # BLD: 9.01 10*3/MM3 (ref 3.4–10.8)
WBC NRBC COR # BLD: 9.34 10*3/MM3 (ref 3.4–10.8)

## 2021-01-01 PROCEDURE — 85730 THROMBOPLASTIN TIME PARTIAL: CPT | Performed by: NURSE PRACTITIONER

## 2021-01-01 PROCEDURE — 87636 SARSCOV2 & INF A&B AMP PRB: CPT | Performed by: PHYSICIAN ASSISTANT

## 2021-01-01 PROCEDURE — 94799 UNLISTED PULMONARY SVC/PX: CPT

## 2021-01-01 PROCEDURE — 99213 OFFICE O/P EST LOW 20 MIN: CPT | Performed by: PHYSICIAN ASSISTANT

## 2021-01-01 PROCEDURE — 97110 THERAPEUTIC EXERCISES: CPT

## 2021-01-01 PROCEDURE — 80053 COMPREHEN METABOLIC PANEL: CPT | Performed by: PHYSICIAN ASSISTANT

## 2021-01-01 PROCEDURE — 25010000002 ROPIVACAINE PER 1 MG

## 2021-01-01 PROCEDURE — C1889 IMPLANT/INSERT DEVICE, NOC: HCPCS | Performed by: ORTHOPAEDIC SURGERY

## 2021-01-01 PROCEDURE — 80053 COMPREHEN METABOLIC PANEL: CPT | Performed by: ORTHOPAEDIC SURGERY

## 2021-01-01 PROCEDURE — 25010000002 ROPIVACAINE PER 1 MG: Performed by: NURSE ANESTHETIST, CERTIFIED REGISTERED

## 2021-01-01 PROCEDURE — 70450 CT HEAD/BRAIN W/O DYE: CPT

## 2021-01-01 PROCEDURE — 25010000002 PROPOFOL 10 MG/ML EMULSION: Performed by: ANESTHESIOLOGY

## 2021-01-01 PROCEDURE — 25010000002 ONDANSETRON PER 1 MG: Performed by: ANESTHESIOLOGY

## 2021-01-01 PROCEDURE — 85025 COMPLETE CBC W/AUTO DIFF WBC: CPT | Performed by: PHYSICIAN ASSISTANT

## 2021-01-01 PROCEDURE — 25010000002 DEXAMETHASONE PER 1 MG: Performed by: ANESTHESIOLOGY

## 2021-01-01 PROCEDURE — 97535 SELF CARE MNGMENT TRAINING: CPT

## 2021-01-01 PROCEDURE — 72100 X-RAY EXAM L-S SPINE 2/3 VWS: CPT

## 2021-01-01 PROCEDURE — 86900 BLOOD TYPING SEROLOGIC ABO: CPT

## 2021-01-01 PROCEDURE — 72148 MRI LUMBAR SPINE W/O DYE: CPT

## 2021-01-01 PROCEDURE — 82077 ASSAY SPEC XCP UR&BREATH IA: CPT | Performed by: NURSE PRACTITIONER

## 2021-01-01 PROCEDURE — 25010000002 HYDROMORPHONE 1 MG/ML SOLUTION

## 2021-01-01 PROCEDURE — 99213 OFFICE O/P EST LOW 20 MIN: CPT | Performed by: FAMILY MEDICINE

## 2021-01-01 PROCEDURE — 0 CEFAZOLIN IN DEXTROSE 2-4 GM/100ML-% SOLUTION: Performed by: ANESTHESIOLOGY

## 2021-01-01 PROCEDURE — 99214 OFFICE O/P EST MOD 30 MIN: CPT | Performed by: NEUROLOGICAL SURGERY

## 2021-01-01 PROCEDURE — 93005 ELECTROCARDIOGRAM TRACING: CPT | Performed by: PHYSICIAN ASSISTANT

## 2021-01-01 PROCEDURE — 86901 BLOOD TYPING SEROLOGIC RH(D): CPT | Performed by: NURSE PRACTITIONER

## 2021-01-01 PROCEDURE — 99214 OFFICE O/P EST MOD 30 MIN: CPT | Performed by: PHYSICIAN ASSISTANT

## 2021-01-01 PROCEDURE — C1776 JOINT DEVICE (IMPLANTABLE): HCPCS | Performed by: ORTHOPAEDIC SURGERY

## 2021-01-01 PROCEDURE — 25010000002 ENOXAPARIN PER 10 MG: Performed by: HOSPITALIST

## 2021-01-01 PROCEDURE — 85027 COMPLETE CBC AUTOMATED: CPT | Performed by: ORTHOPAEDIC SURGERY

## 2021-01-01 PROCEDURE — 85025 COMPLETE CBC W/AUTO DIFF WBC: CPT | Performed by: NURSE PRACTITIONER

## 2021-01-01 PROCEDURE — 97166 OT EVAL MOD COMPLEX 45 MIN: CPT

## 2021-01-01 PROCEDURE — 99204 OFFICE O/P NEW MOD 45 MIN: CPT | Performed by: PHYSICIAN ASSISTANT

## 2021-01-01 PROCEDURE — 97162 PT EVAL MOD COMPLEX 30 MIN: CPT

## 2021-01-01 PROCEDURE — 0 CEFAZOLIN IN DEXTROSE 2-4 GM/100ML-% SOLUTION: Performed by: ORTHOPAEDIC SURGERY

## 2021-01-01 PROCEDURE — 99223 1ST HOSP IP/OBS HIGH 75: CPT | Performed by: INTERNAL MEDICINE

## 2021-01-01 PROCEDURE — 73502 X-RAY EXAM HIP UNI 2-3 VIEWS: CPT

## 2021-01-01 PROCEDURE — 97116 GAIT TRAINING THERAPY: CPT

## 2021-01-01 PROCEDURE — 25010000002 ROPIVACAINE PER 1 MG: Performed by: ORTHOPAEDIC SURGERY

## 2021-01-01 PROCEDURE — 86850 RBC ANTIBODY SCREEN: CPT | Performed by: NURSE PRACTITIONER

## 2021-01-01 PROCEDURE — 99284 EMERGENCY DEPT VISIT MOD MDM: CPT

## 2021-01-01 PROCEDURE — 99233 SBSQ HOSP IP/OBS HIGH 50: CPT | Performed by: INTERNAL MEDICINE

## 2021-01-01 PROCEDURE — 99214 OFFICE O/P EST MOD 30 MIN: CPT | Performed by: FAMILY MEDICINE

## 2021-01-01 PROCEDURE — 70486 CT MAXILLOFACIAL W/O DYE: CPT

## 2021-01-01 PROCEDURE — 0SRS0J9 REPLACEMENT OF LEFT HIP JOINT, FEMORAL SURFACE WITH SYNTHETIC SUBSTITUTE, CEMENTED, OPEN APPROACH: ICD-10-PCS | Performed by: ORTHOPAEDIC SURGERY

## 2021-01-01 PROCEDURE — 85610 PROTHROMBIN TIME: CPT | Performed by: PHYSICIAN ASSISTANT

## 2021-01-01 PROCEDURE — 99202 OFFICE O/P NEW SF 15 MIN: CPT

## 2021-01-01 PROCEDURE — 25010000002 NEOSTIGMINE 10 MG/10ML SOLUTION: Performed by: ANESTHESIOLOGY

## 2021-01-01 PROCEDURE — 99233 SBSQ HOSP IP/OBS HIGH 50: CPT | Performed by: HOSPITALIST

## 2021-01-01 PROCEDURE — 86901 BLOOD TYPING SEROLOGIC RH(D): CPT

## 2021-01-01 PROCEDURE — C1713 ANCHOR/SCREW BN/BN,TIS/BN: HCPCS | Performed by: ORTHOPAEDIC SURGERY

## 2021-01-01 PROCEDURE — 71045 X-RAY EXAM CHEST 1 VIEW: CPT

## 2021-01-01 PROCEDURE — 99283 EMERGENCY DEPT VISIT LOW MDM: CPT

## 2021-01-01 PROCEDURE — 86900 BLOOD TYPING SEROLOGIC ABO: CPT | Performed by: NURSE PRACTITIONER

## 2021-01-01 PROCEDURE — 99282 EMERGENCY DEPT VISIT SF MDM: CPT

## 2021-01-01 DEVICE — AVENIR® STANDARD STEM CEMENTED 6
Type: IMPLANTABLE DEVICE | Site: HIP | Status: FUNCTIONAL
Brand: AVENIR®

## 2021-01-01 DEVICE — SUT FW 5 W .5 CIR CUT NDL 48M AR7211: Type: IMPLANTABLE DEVICE | Site: HIP | Status: FUNCTIONAL

## 2021-01-01 DEVICE — HD FEM/HIP UNIV COCR 12/14TPR 28MM MIN3.5: Type: IMPLANTABLE DEVICE | Site: HIP | Status: FUNCTIONAL

## 2021-01-01 DEVICE — IMPLANTABLE DEVICE
Type: IMPLANTABLE DEVICE | Site: HIP | Status: FUNCTIONAL
Brand: REFOBACIN® BONE CEMENT R

## 2021-01-01 DEVICE — BONE PREPARATION KIT
Type: IMPLANTABLE DEVICE | Site: HIP | Status: FUNCTIONAL
Brand: BIOPREP

## 2021-01-01 DEVICE — DEV CONTRL TISS STRATAFIX SPIRAL PDO BIDIR MO4 36X36CM: Type: IMPLANTABLE DEVICE | Site: HIP | Status: FUNCTIONAL

## 2021-01-01 DEVICE — IMPLANTABLE DEVICE
Type: IMPLANTABLE DEVICE | Site: HIP | Status: FUNCTIONAL
Brand: RINGLOC BI-POLAR HIP SYSTEM

## 2021-01-01 DEVICE — CAP PRT HIP BIPOL: Type: IMPLANTABLE DEVICE | Site: HIP | Status: FUNCTIONAL

## 2021-01-01 RX ORDER — SODIUM CHLORIDE, SODIUM LACTATE, POTASSIUM CHLORIDE, CALCIUM CHLORIDE 600; 310; 30; 20 MG/100ML; MG/100ML; MG/100ML; MG/100ML
75 INJECTION, SOLUTION INTRAVENOUS CONTINUOUS
Status: ACTIVE | OUTPATIENT
Start: 2021-01-01 | End: 2021-01-01

## 2021-01-01 RX ORDER — DULOXETIN HYDROCHLORIDE 30 MG/1
CAPSULE, DELAYED RELEASE ORAL
Qty: 60 CAPSULE | Refills: 0 | Status: ON HOLD | OUTPATIENT
Start: 2021-01-01 | End: 2022-01-01

## 2021-01-01 RX ORDER — SODIUM CHLORIDE, SODIUM LACTATE, POTASSIUM CHLORIDE, CALCIUM CHLORIDE 600; 310; 30; 20 MG/100ML; MG/100ML; MG/100ML; MG/100ML
INJECTION, SOLUTION INTRAVENOUS CONTINUOUS PRN
Status: DISCONTINUED | OUTPATIENT
Start: 2021-01-01 | End: 2021-01-01 | Stop reason: SURG

## 2021-01-01 RX ORDER — CYCLOBENZAPRINE HCL 10 MG
10 TABLET ORAL 3 TIMES DAILY PRN
Qty: 90 TABLET | Refills: 0 | Status: SHIPPED | OUTPATIENT
Start: 2021-01-01

## 2021-01-01 RX ORDER — TAMSULOSIN HYDROCHLORIDE 0.4 MG/1
0.4 CAPSULE ORAL DAILY
Status: DISCONTINUED | OUTPATIENT
Start: 2021-01-01 | End: 2022-01-01 | Stop reason: HOSPADM

## 2021-01-01 RX ORDER — GABAPENTIN 300 MG/1
300 CAPSULE ORAL 3 TIMES DAILY
Qty: 90 CAPSULE | Refills: 1 | Status: SHIPPED | OUTPATIENT
Start: 2021-01-01 | End: 2022-01-01

## 2021-01-01 RX ORDER — ROPIVACAINE HYDROCHLORIDE 2 MG/ML
8 INJECTION, SOLUTION EPIDURAL; INFILTRATION CONTINUOUS
Status: DISCONTINUED | OUTPATIENT
Start: 2021-01-01 | End: 2022-01-01 | Stop reason: HOSPADM

## 2021-01-01 RX ORDER — PROPOFOL 10 MG/ML
VIAL (ML) INTRAVENOUS AS NEEDED
Status: DISCONTINUED | OUTPATIENT
Start: 2021-01-01 | End: 2021-01-01 | Stop reason: SURG

## 2021-01-01 RX ORDER — LORAZEPAM 1 MG/1
1 TABLET ORAL
Status: DISCONTINUED | OUTPATIENT
Start: 2021-01-01 | End: 2022-01-01 | Stop reason: HOSPADM

## 2021-01-01 RX ORDER — DULOXETIN HYDROCHLORIDE 30 MG/1
30 CAPSULE, DELAYED RELEASE ORAL DAILY
Status: DISCONTINUED | OUTPATIENT
Start: 2021-01-01 | End: 2022-01-01 | Stop reason: HOSPADM

## 2021-01-01 RX ORDER — BISACODYL 5 MG/1
5 TABLET, DELAYED RELEASE ORAL DAILY PRN
Status: DISCONTINUED | OUTPATIENT
Start: 2021-01-01 | End: 2022-01-01 | Stop reason: HOSPADM

## 2021-01-01 RX ORDER — LIDOCAINE HYDROCHLORIDE AND EPINEPHRINE 10; 10 MG/ML; UG/ML
10 INJECTION, SOLUTION INFILTRATION; PERINEURAL ONCE
Status: COMPLETED | OUTPATIENT
Start: 2021-01-01 | End: 2021-01-01

## 2021-01-01 RX ORDER — PANTOPRAZOLE SODIUM 40 MG/1
40 TABLET, DELAYED RELEASE ORAL 2 TIMES DAILY
Status: DISCONTINUED | OUTPATIENT
Start: 2021-01-01 | End: 2022-01-01 | Stop reason: HOSPADM

## 2021-01-01 RX ORDER — CEFAZOLIN SODIUM 2 G/100ML
INJECTION, SOLUTION INTRAVENOUS AS NEEDED
Status: DISCONTINUED | OUTPATIENT
Start: 2021-01-01 | End: 2021-01-01 | Stop reason: SURG

## 2021-01-01 RX ORDER — LORAZEPAM 0.5 MG/1
0.5 TABLET ORAL
Status: DISCONTINUED | OUTPATIENT
Start: 2021-01-01 | End: 2022-01-01 | Stop reason: HOSPADM

## 2021-01-01 RX ORDER — ROCURONIUM BROMIDE 10 MG/ML
INJECTION, SOLUTION INTRAVENOUS AS NEEDED
Status: DISCONTINUED | OUTPATIENT
Start: 2021-01-01 | End: 2021-01-01 | Stop reason: SURG

## 2021-01-01 RX ORDER — LIDOCAINE 50 MG/G
1 PATCH TOPICAL EVERY 24 HOURS
Qty: 30 EACH | Refills: 0 | Status: SHIPPED | OUTPATIENT
Start: 2021-01-01 | End: 2022-01-01 | Stop reason: SDUPTHER

## 2021-01-01 RX ORDER — OXYCODONE HYDROCHLORIDE 5 MG/1
5 TABLET ORAL EVERY 6 HOURS PRN
Qty: 45 TABLET | Refills: 0 | Status: SHIPPED | OUTPATIENT
Start: 2021-01-01 | End: 2022-01-01 | Stop reason: HOSPADM

## 2021-01-01 RX ORDER — ONDANSETRON 2 MG/ML
INJECTION INTRAMUSCULAR; INTRAVENOUS AS NEEDED
Status: DISCONTINUED | OUTPATIENT
Start: 2021-01-01 | End: 2021-01-01 | Stop reason: SURG

## 2021-01-01 RX ORDER — PANTOPRAZOLE SODIUM 40 MG/1
40 TABLET, DELAYED RELEASE ORAL 2 TIMES DAILY
Qty: 180 TABLET | Refills: 1 | Status: SHIPPED | OUTPATIENT
Start: 2021-01-01

## 2021-01-01 RX ORDER — ROPIVACAINE HYDROCHLORIDE 5 MG/ML
INJECTION, SOLUTION EPIDURAL; INFILTRATION; PERINEURAL
Status: COMPLETED | OUTPATIENT
Start: 2021-01-01 | End: 2021-01-01

## 2021-01-01 RX ORDER — TRANEXAMIC ACID 10 MG/ML
INJECTION, SOLUTION INTRAVENOUS AS NEEDED
Status: DISCONTINUED | OUTPATIENT
Start: 2021-01-01 | End: 2021-01-01 | Stop reason: SURG

## 2021-01-01 RX ORDER — SODIUM CHLORIDE, SODIUM LACTATE, POTASSIUM CHLORIDE, CALCIUM CHLORIDE 600; 310; 30; 20 MG/100ML; MG/100ML; MG/100ML; MG/100ML
9 INJECTION, SOLUTION INTRAVENOUS CONTINUOUS
Status: DISCONTINUED | OUTPATIENT
Start: 2021-01-01 | End: 2022-01-01 | Stop reason: HOSPADM

## 2021-01-01 RX ORDER — TRANEXAMIC ACID 10 MG/ML
1000 INJECTION, SOLUTION INTRAVENOUS ONCE
Status: DISCONTINUED | OUTPATIENT
Start: 2021-01-01 | End: 2021-01-01

## 2021-01-01 RX ORDER — CELECOXIB 200 MG/1
200 CAPSULE ORAL 2 TIMES DAILY
Qty: 60 CAPSULE | Refills: 2 | Status: SHIPPED | OUTPATIENT
Start: 2021-01-01 | End: 2022-01-01 | Stop reason: HOSPADM

## 2021-01-01 RX ORDER — CYCLOBENZAPRINE HCL 10 MG
5 TABLET ORAL 3 TIMES DAILY PRN
Qty: 90 TABLET | Refills: 0 | Status: SHIPPED | OUTPATIENT
Start: 2021-01-01 | End: 2021-01-01 | Stop reason: SDUPTHER

## 2021-01-01 RX ORDER — LORAZEPAM 2 MG/ML
1 INJECTION INTRAMUSCULAR
Status: DISCONTINUED | OUTPATIENT
Start: 2021-01-01 | End: 2022-01-01 | Stop reason: HOSPADM

## 2021-01-01 RX ORDER — MORPHINE SULFATE 2 MG/ML
1 INJECTION, SOLUTION INTRAMUSCULAR; INTRAVENOUS EVERY 4 HOURS PRN
Status: DISCONTINUED | OUTPATIENT
Start: 2021-01-01 | End: 2022-01-01 | Stop reason: HOSPADM

## 2021-01-01 RX ORDER — OXYCODONE HYDROCHLORIDE 5 MG/1
10 TABLET ORAL EVERY 6 HOURS PRN
Status: DISCONTINUED | OUTPATIENT
Start: 2021-01-01 | End: 2022-01-01 | Stop reason: HOSPADM

## 2021-01-01 RX ORDER — MAGNESIUM HYDROXIDE 1200 MG/15ML
LIQUID ORAL AS NEEDED
Status: DISCONTINUED | OUTPATIENT
Start: 2021-01-01 | End: 2021-01-01 | Stop reason: HOSPADM

## 2021-01-01 RX ORDER — TRAMADOL HYDROCHLORIDE 50 MG/1
50 TABLET ORAL EVERY 8 HOURS PRN
Qty: 30 TABLET | Refills: 0 | Status: ON HOLD | OUTPATIENT
Start: 2021-01-01 | End: 2022-01-01

## 2021-01-01 RX ORDER — CEFAZOLIN SODIUM 2 G/100ML
2 INJECTION, SOLUTION INTRAVENOUS ONCE
Status: DISCONTINUED | OUTPATIENT
Start: 2021-01-01 | End: 2021-01-01 | Stop reason: SDUPTHER

## 2021-01-01 RX ORDER — NICOTINE 21 MG/24HR
1 PATCH, TRANSDERMAL 24 HOURS TRANSDERMAL
Status: DISCONTINUED | OUTPATIENT
Start: 2021-01-01 | End: 2022-01-01 | Stop reason: HOSPADM

## 2021-01-01 RX ORDER — HYDROMORPHONE HYDROCHLORIDE 1 MG/ML
0.5 INJECTION, SOLUTION INTRAMUSCULAR; INTRAVENOUS; SUBCUTANEOUS
Status: DISCONTINUED | OUTPATIENT
Start: 2021-01-01 | End: 2021-01-01 | Stop reason: HOSPADM

## 2021-01-01 RX ORDER — AMOXICILLIN 250 MG
2 CAPSULE ORAL 2 TIMES DAILY
Status: DISCONTINUED | OUTPATIENT
Start: 2021-01-01 | End: 2022-01-01 | Stop reason: HOSPADM

## 2021-01-01 RX ORDER — CEFAZOLIN SODIUM 2 G/100ML
2 INJECTION, SOLUTION INTRAVENOUS EVERY 8 HOURS
Status: COMPLETED | OUTPATIENT
Start: 2021-01-01 | End: 2021-01-01

## 2021-01-01 RX ORDER — FOLIC ACID 1 MG/1
1000 TABLET ORAL DAILY
Status: DISCONTINUED | OUTPATIENT
Start: 2021-01-01 | End: 2022-01-01 | Stop reason: HOSPADM

## 2021-01-01 RX ORDER — GLYCOPYRROLATE 0.2 MG/ML
INJECTION INTRAMUSCULAR; INTRAVENOUS AS NEEDED
Status: DISCONTINUED | OUTPATIENT
Start: 2021-01-01 | End: 2021-01-01 | Stop reason: SURG

## 2021-01-01 RX ORDER — POLYETHYLENE GLYCOL 3350 17 G/17G
17 POWDER, FOR SOLUTION ORAL DAILY PRN
Status: DISCONTINUED | OUTPATIENT
Start: 2021-01-01 | End: 2022-01-01 | Stop reason: HOSPADM

## 2021-01-01 RX ORDER — FENTANYL CITRATE 50 UG/ML
50 INJECTION, SOLUTION INTRAMUSCULAR; INTRAVENOUS
Status: DISCONTINUED | OUTPATIENT
Start: 2021-01-01 | End: 2021-01-01 | Stop reason: HOSPADM

## 2021-01-01 RX ORDER — CEFAZOLIN SODIUM 2 G/100ML
2 INJECTION, SOLUTION INTRAVENOUS ONCE
Status: DISCONTINUED | OUTPATIENT
Start: 2021-01-01 | End: 2021-01-01

## 2021-01-01 RX ORDER — LORAZEPAM 2 MG/ML
0.5 INJECTION INTRAMUSCULAR
Status: DISCONTINUED | OUTPATIENT
Start: 2021-01-01 | End: 2022-01-01 | Stop reason: HOSPADM

## 2021-01-01 RX ORDER — DEXAMETHASONE SODIUM PHOSPHATE 4 MG/ML
INJECTION, SOLUTION INTRA-ARTICULAR; INTRALESIONAL; INTRAMUSCULAR; INTRAVENOUS; SOFT TISSUE AS NEEDED
Status: DISCONTINUED | OUTPATIENT
Start: 2021-01-01 | End: 2021-01-01 | Stop reason: SURG

## 2021-01-01 RX ORDER — LIDOCAINE 50 MG/G
1 PATCH TOPICAL EVERY 24 HOURS
Status: DISCONTINUED | OUTPATIENT
Start: 2021-01-01 | End: 2022-01-01 | Stop reason: HOSPADM

## 2021-01-01 RX ORDER — DIPHENOXYLATE HYDROCHLORIDE AND ATROPINE SULFATE 2.5; .025 MG/1; MG/1
1 TABLET ORAL DAILY
Status: DISCONTINUED | OUTPATIENT
Start: 2021-01-01 | End: 2022-01-01 | Stop reason: HOSPADM

## 2021-01-01 RX ORDER — BISACODYL 10 MG
10 SUPPOSITORY, RECTAL RECTAL DAILY PRN
Status: DISCONTINUED | OUTPATIENT
Start: 2021-01-01 | End: 2022-01-01 | Stop reason: HOSPADM

## 2021-01-01 RX ORDER — GABAPENTIN 300 MG/1
300 CAPSULE ORAL 3 TIMES DAILY
Status: DISCONTINUED | OUTPATIENT
Start: 2021-01-01 | End: 2022-01-01 | Stop reason: HOSPADM

## 2021-01-01 RX ORDER — NEOSTIGMINE METHYLSULFATE 1 MG/ML
INJECTION, SOLUTION INTRAVENOUS AS NEEDED
Status: DISCONTINUED | OUTPATIENT
Start: 2021-01-01 | End: 2021-01-01 | Stop reason: SURG

## 2021-01-01 RX ADMIN — PROPOFOL 150 MG: 10 INJECTION, EMULSION INTRAVENOUS at 17:40

## 2021-01-01 RX ADMIN — GABAPENTIN 300 MG: 300 CAPSULE ORAL at 08:22

## 2021-01-01 RX ADMIN — DULOXETINE HYDROCHLORIDE 30 MG: 30 CAPSULE, DELAYED RELEASE ORAL at 09:49

## 2021-01-01 RX ADMIN — ONDANSETRON 4 MG: 2 INJECTION INTRAMUSCULAR; INTRAVENOUS at 18:41

## 2021-01-01 RX ADMIN — ROPIVACAINE HYDROCHLORIDE 8 ML/HR: 2 INJECTION, SOLUTION EPIDURAL; INFILTRATION at 10:58

## 2021-01-01 RX ADMIN — GABAPENTIN 300 MG: 300 CAPSULE ORAL at 20:32

## 2021-01-01 RX ADMIN — SODIUM CHLORIDE, POTASSIUM CHLORIDE, SODIUM LACTATE AND CALCIUM CHLORIDE 9 ML/HR: 600; 310; 30; 20 INJECTION, SOLUTION INTRAVENOUS at 15:30

## 2021-01-01 RX ADMIN — ROPIVACAINE HYDROCHLORIDE 8 ML/HR: 2 INJECTION, SOLUTION EPIDURAL; INFILTRATION at 02:11

## 2021-01-01 RX ADMIN — CEFAZOLIN SODIUM 2 G: 2 INJECTION, SOLUTION INTRAVENOUS at 17:50

## 2021-01-01 RX ADMIN — TAMSULOSIN HYDROCHLORIDE 0.4 MG: 0.4 CAPSULE ORAL at 09:49

## 2021-01-01 RX ADMIN — PANTOPRAZOLE SODIUM 40 MG: 40 TABLET, DELAYED RELEASE ORAL at 08:21

## 2021-01-01 RX ADMIN — GABAPENTIN 300 MG: 300 CAPSULE ORAL at 21:24

## 2021-01-01 RX ADMIN — METOPROLOL TARTRATE 5 MG: 5 INJECTION INTRAVENOUS at 18:00

## 2021-01-01 RX ADMIN — SODIUM CHLORIDE, POTASSIUM CHLORIDE, SODIUM LACTATE AND CALCIUM CHLORIDE 9 ML/HR: 600; 310; 30; 20 INJECTION, SOLUTION INTRAVENOUS at 20:10

## 2021-01-01 RX ADMIN — MULTIVITAMIN TABLET 1 TABLET: TABLET at 08:22

## 2021-01-01 RX ADMIN — OXYCODONE HYDROCHLORIDE 10 MG: 5 TABLET ORAL at 10:30

## 2021-01-01 RX ADMIN — ROPIVACAINE HYDROCHLORIDE 8 ML/HR: 2 INJECTION, SOLUTION EPIDURAL; INFILTRATION at 15:15

## 2021-01-01 RX ADMIN — PANTOPRAZOLE SODIUM 40 MG: 40 TABLET, DELAYED RELEASE ORAL at 08:22

## 2021-01-01 RX ADMIN — ENOXAPARIN SODIUM 40 MG: 40 INJECTION SUBCUTANEOUS at 20:32

## 2021-01-01 RX ADMIN — PANTOPRAZOLE SODIUM 40 MG: 40 TABLET, DELAYED RELEASE ORAL at 21:24

## 2021-01-01 RX ADMIN — GLYCOPYRROLATE 0.4 MG: 0.2 INJECTION INTRAMUSCULAR; INTRAVENOUS at 18:39

## 2021-01-01 RX ADMIN — NEOSTIGMINE 2.5 MG: 1 INJECTION INTRAVENOUS at 18:39

## 2021-01-01 RX ADMIN — CEFAZOLIN SODIUM 2 G: 2 INJECTION, SOLUTION INTRAVENOUS at 12:57

## 2021-01-01 RX ADMIN — NICOTINE 1 PATCH: 14 PATCH, EXTENDED RELEASE TRANSDERMAL at 08:20

## 2021-01-01 RX ADMIN — ROPIVACAINE HYDROCHLORIDE 8 ML/HR: 2 INJECTION, SOLUTION EPIDURAL; INFILTRATION at 19:07

## 2021-01-01 RX ADMIN — LIDOCAINE HYDROCHLORIDE,EPINEPHRINE BITARTRATE 10 ML: 10; .01 INJECTION, SOLUTION INFILTRATION; PERINEURAL at 12:44

## 2021-01-01 RX ADMIN — SODIUM CHLORIDE, POTASSIUM CHLORIDE, SODIUM LACTATE AND CALCIUM CHLORIDE 9 ML/HR: 600; 310; 30; 20 INJECTION, SOLUTION INTRAVENOUS at 19:46

## 2021-01-01 RX ADMIN — ROPIVACAINE HYDROCHLORIDE 8 ML/HR: 2 INJECTION, SOLUTION EPIDURAL; INFILTRATION at 14:30

## 2021-01-01 RX ADMIN — GABAPENTIN 300 MG: 300 CAPSULE ORAL at 09:49

## 2021-01-01 RX ADMIN — TRANEXAMIC ACID 1000 MG: 10 INJECTION, SOLUTION INTRAVENOUS at 18:29

## 2021-01-01 RX ADMIN — DOCUSATE SODIUM 50 MG AND SENNOSIDES 8.6 MG 2 TABLET: 8.6; 5 TABLET, FILM COATED ORAL at 21:23

## 2021-01-01 RX ADMIN — GABAPENTIN 300 MG: 300 CAPSULE ORAL at 08:21

## 2021-01-01 RX ADMIN — LIDOCAINE 1 PATCH: 50 PATCH CUTANEOUS at 06:02

## 2021-01-01 RX ADMIN — DULOXETINE HYDROCHLORIDE 30 MG: 30 CAPSULE, DELAYED RELEASE ORAL at 08:21

## 2021-01-01 RX ADMIN — HYDROMORPHONE HYDROCHLORIDE 0.5 MG: 1 INJECTION, SOLUTION INTRAMUSCULAR; INTRAVENOUS; SUBCUTANEOUS at 19:39

## 2021-01-01 RX ADMIN — NICOTINE 1 PATCH: 14 PATCH, EXTENDED RELEASE TRANSDERMAL at 08:31

## 2021-01-01 RX ADMIN — BISACODYL 5 MG: 5 TABLET, COATED ORAL at 21:24

## 2021-01-01 RX ADMIN — SODIUM CHLORIDE, POTASSIUM CHLORIDE, SODIUM LACTATE AND CALCIUM CHLORIDE 75 ML/HR: 600; 310; 30; 20 INJECTION, SOLUTION INTRAVENOUS at 06:24

## 2021-01-01 RX ADMIN — FOLIC ACID 1000 MCG: 1 TABLET ORAL at 09:49

## 2021-01-01 RX ADMIN — SODIUM CHLORIDE, POTASSIUM CHLORIDE, SODIUM LACTATE AND CALCIUM CHLORIDE: 600; 310; 30; 20 INJECTION, SOLUTION INTRAVENOUS at 18:15

## 2021-01-01 RX ADMIN — SODIUM CHLORIDE, POTASSIUM CHLORIDE, SODIUM LACTATE AND CALCIUM CHLORIDE: 600; 310; 30; 20 INJECTION, SOLUTION INTRAVENOUS at 17:35

## 2021-01-01 RX ADMIN — MULTIVITAMIN TABLET 1 TABLET: TABLET at 09:49

## 2021-01-01 RX ADMIN — TAMSULOSIN HYDROCHLORIDE 0.4 MG: 0.4 CAPSULE ORAL at 08:22

## 2021-01-01 RX ADMIN — TAMSULOSIN HYDROCHLORIDE 0.4 MG: 0.4 CAPSULE ORAL at 08:21

## 2021-01-01 RX ADMIN — GABAPENTIN 300 MG: 300 CAPSULE ORAL at 17:01

## 2021-01-01 RX ADMIN — TRANEXAMIC ACID 1000 MG: 10 INJECTION, SOLUTION INTRAVENOUS at 17:55

## 2021-01-01 RX ADMIN — GABAPENTIN 300 MG: 300 CAPSULE ORAL at 16:29

## 2021-01-01 RX ADMIN — FOLIC ACID 1000 MCG: 1 TABLET ORAL at 08:22

## 2021-01-01 RX ADMIN — POLYETHYLENE GLYCOL 3350 17 G: 17 POWDER, FOR SOLUTION ORAL at 08:22

## 2021-01-01 RX ADMIN — CEFAZOLIN SODIUM 2 G: 2 INJECTION, SOLUTION INTRAVENOUS at 19:41

## 2021-01-01 RX ADMIN — DULOXETINE HYDROCHLORIDE 30 MG: 30 CAPSULE, DELAYED RELEASE ORAL at 08:22

## 2021-01-01 RX ADMIN — ENOXAPARIN SODIUM 40 MG: 40 INJECTION SUBCUTANEOUS at 21:24

## 2021-01-01 RX ADMIN — DEXAMETHASONE SODIUM PHOSPHATE 4 MG: 4 INJECTION, SOLUTION INTRA-ARTICULAR; INTRALESIONAL; INTRAMUSCULAR; INTRAVENOUS; SOFT TISSUE at 18:41

## 2021-01-01 RX ADMIN — PANTOPRAZOLE SODIUM 40 MG: 40 TABLET, DELAYED RELEASE ORAL at 21:18

## 2021-01-01 RX ADMIN — THIAMINE HCL TAB 100 MG 100 MG: 100 TAB at 08:22

## 2021-01-01 RX ADMIN — DOCUSATE SODIUM 50 MG AND SENNOSIDES 8.6 MG 2 TABLET: 8.6; 5 TABLET, FILM COATED ORAL at 14:07

## 2021-01-01 RX ADMIN — NICOTINE 1 PATCH: 14 PATCH, EXTENDED RELEASE TRANSDERMAL at 09:49

## 2021-01-01 RX ADMIN — ROCURONIUM BROMIDE 40 MG: 10 INJECTION INTRAVENOUS at 17:40

## 2021-01-01 RX ADMIN — PANTOPRAZOLE SODIUM 40 MG: 40 TABLET, DELAYED RELEASE ORAL at 09:49

## 2021-01-01 RX ADMIN — GABAPENTIN 300 MG: 300 CAPSULE ORAL at 21:18

## 2021-01-01 RX ADMIN — ROPIVACAINE HYDROCHLORIDE 30 ML: 5 INJECTION, SOLUTION EPIDURAL; INFILTRATION; PERINEURAL at 09:14

## 2021-01-01 RX ADMIN — THIAMINE HCL TAB 100 MG 100 MG: 100 TAB at 08:21

## 2021-01-01 RX ADMIN — LIDOCAINE 1 PATCH: 50 PATCH CUTANEOUS at 06:29

## 2021-01-01 RX ADMIN — POLYETHYLENE GLYCOL 3350 17 G: 17 POWDER, FOR SOLUTION ORAL at 14:07

## 2021-01-01 RX ADMIN — PANTOPRAZOLE SODIUM 40 MG: 40 TABLET, DELAYED RELEASE ORAL at 20:32

## 2021-01-28 ENCOUNTER — HOSPITAL ENCOUNTER (INPATIENT)
Facility: HOSPITAL | Age: 71
LOS: 4 days | Discharge: NURSING, REHAB OR LONG-TERM CARE | End: 2021-02-01
Attending: EMERGENCY MEDICINE | Admitting: ORTHOPAEDIC SURGERY

## 2021-01-28 ENCOUNTER — APPOINTMENT (OUTPATIENT)
Dept: GENERAL RADIOLOGY | Facility: HOSPITAL | Age: 71
End: 2021-01-28

## 2021-01-28 ENCOUNTER — ANESTHESIA (OUTPATIENT)
Dept: PERIOP | Facility: HOSPITAL | Age: 71
End: 2021-01-28

## 2021-01-28 ENCOUNTER — ANESTHESIA EVENT (OUTPATIENT)
Dept: PERIOP | Facility: HOSPITAL | Age: 71
End: 2021-01-28

## 2021-01-28 DIAGNOSIS — S72.001A CLOSED RIGHT HIP FRACTURE, INITIAL ENCOUNTER (HCC): Primary | ICD-10-CM

## 2021-01-28 DIAGNOSIS — M54.50 ACUTE BILATERAL LOW BACK PAIN WITHOUT SCIATICA: ICD-10-CM

## 2021-01-28 DIAGNOSIS — S50.01XA CONTUSION OF RIGHT ELBOW, INITIAL ENCOUNTER: ICD-10-CM
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
## 2021-01-28 DIAGNOSIS — S72.001A CLOSED FRACTURE OF RIGHT HIP, INITIAL ENCOUNTER (HCC): ICD-10-CM

## 2021-01-28 PROBLEM — S72.009A HIP FRACTURE (HCC): Status: ACTIVE | Noted: 2021-01-28

## 2021-01-28 PROBLEM — F17.200 SMOKER: Status: ACTIVE | Noted: 2021-01-28

## 2021-01-28 LAB
ALBUMIN SERPL-MCNC: 3.2 G/DL (ref 3.5–5.2)
ALBUMIN/GLOB SERPL: 1.1 G/DL
ALP SERPL-CCNC: 346 U/L (ref 39–117)
ALT SERPL W P-5'-P-CCNC: 22 U/L (ref 1–41)
ANION GAP SERPL CALCULATED.3IONS-SCNC: 13 MMOL/L (ref 5–15)
AST SERPL-CCNC: 47 U/L (ref 1–40)
BASOPHILS # BLD AUTO: 0.06 10*3/MM3 (ref 0–0.2)
BASOPHILS NFR BLD AUTO: 0.7 % (ref 0–1.5)
BILIRUB SERPL-MCNC: 3.2 MG/DL (ref 0–1.2)
BUN SERPL-MCNC: 9 MG/DL (ref 8–23)
BUN/CREAT SERPL: 11.3 (ref 7–25)
CALCIUM SPEC-SCNC: 8.9 MG/DL (ref 8.6–10.5)
CHLORIDE SERPL-SCNC: 103 MMOL/L (ref 98–107)
CO2 SERPL-SCNC: 22 MMOL/L (ref 22–29)
CREAT SERPL-MCNC: 0.8 MG/DL (ref 0.76–1.27)
DEPRECATED RDW RBC AUTO: 51.1 FL (ref 37–54)
EOSINOPHIL # BLD AUTO: 0.11 10*3/MM3 (ref 0–0.4)
EOSINOPHIL NFR BLD AUTO: 1.3 % (ref 0.3–6.2)
ERYTHROCYTE [DISTWIDTH] IN BLOOD BY AUTOMATED COUNT: 12.7 % (ref 12.3–15.4)
FLUAV RNA RESP QL NAA+PROBE: NOT DETECTED
FLUBV RNA RESP QL NAA+PROBE: NOT DETECTED
GFR SERPL CREATININE-BSD FRML MDRD: 96 ML/MIN/1.73
GLOBULIN UR ELPH-MCNC: 2.9 GM/DL
GLUCOSE SERPL-MCNC: 131 MG/DL (ref 65–99)
HCT VFR BLD AUTO: 43.7 % (ref 37.5–51)
HGB BLD-MCNC: 13.9 G/DL (ref 13–17.7)
IMM GRANULOCYTES # BLD AUTO: 0.04 10*3/MM3 (ref 0–0.05)
IMM GRANULOCYTES NFR BLD AUTO: 0.5 % (ref 0–0.5)
INR PPP: 1.1 (ref 0.85–1.16)
LYMPHOCYTES # BLD AUTO: 1.83 10*3/MM3 (ref 0.7–3.1)
LYMPHOCYTES NFR BLD AUTO: 21.3 % (ref 19.6–45.3)
MCH RBC QN AUTO: 34.2 PG (ref 26.6–33)
MCHC RBC AUTO-ENTMCNC: 31.8 G/DL (ref 31.5–35.7)
MCV RBC AUTO: 107.6 FL (ref 79–97)
MONOCYTES # BLD AUTO: 0.99 10*3/MM3 (ref 0.1–0.9)
MONOCYTES NFR BLD AUTO: 11.5 % (ref 5–12)
NEUTROPHILS NFR BLD AUTO: 5.57 10*3/MM3 (ref 1.7–7)
NEUTROPHILS NFR BLD AUTO: 64.7 % (ref 42.7–76)
NRBC BLD AUTO-RTO: 0 /100 WBC (ref 0–0.2)
PLATELET # BLD AUTO: 279 10*3/MM3 (ref 140–450)
PMV BLD AUTO: 10.6 FL (ref 6–12)
POTASSIUM SERPL-SCNC: 4.2 MMOL/L (ref 3.5–5.2)
PROT SERPL-MCNC: 6.1 G/DL (ref 6–8.5)
PROTHROMBIN TIME: 14 SECONDS (ref 11.5–14)
RBC # BLD AUTO: 4.06 10*6/MM3 (ref 4.14–5.8)
SARS-COV-2 RNA RESP QL NAA+PROBE: NOT DETECTED
SODIUM SERPL-SCNC: 138 MMOL/L (ref 136–145)
WBC # BLD AUTO: 8.6 10*3/MM3 (ref 3.4–10.8)

## 2021-01-28 PROCEDURE — C1713 ANCHOR/SCREW BN/BN,TIS/BN: HCPCS | Performed by: ORTHOPAEDIC SURGERY

## 2021-01-28 PROCEDURE — 99284 EMERGENCY DEPT VISIT MOD MDM: CPT

## 2021-01-28 PROCEDURE — 85025 COMPLETE CBC W/AUTO DIFF WBC: CPT | Performed by: EMERGENCY MEDICINE

## 2021-01-28 PROCEDURE — 71045 X-RAY EXAM CHEST 1 VIEW: CPT

## 2021-01-28 PROCEDURE — 76942 ECHO GUIDE FOR BIOPSY: CPT | Performed by: ORTHOPAEDIC SURGERY

## 2021-01-28 PROCEDURE — 80053 COMPREHEN METABOLIC PANEL: CPT | Performed by: EMERGENCY MEDICINE

## 2021-01-28 PROCEDURE — 25010000002 CEFAZOLIN PER 500 MG: Performed by: ORTHOPAEDIC SURGERY

## 2021-01-28 PROCEDURE — 25010000002 ROPIVACAINE PER 1 MG: Performed by: NURSE ANESTHETIST, CERTIFIED REGISTERED

## 2021-01-28 PROCEDURE — 73070 X-RAY EXAM OF ELBOW: CPT

## 2021-01-28 PROCEDURE — 25010000002 FENTANYL CITRATE (PF) 100 MCG/2ML SOLUTION: Performed by: NURSE ANESTHETIST, CERTIFIED REGISTERED

## 2021-01-28 PROCEDURE — 25010000002 PROPOFOL 10 MG/ML EMULSION: Performed by: NURSE ANESTHETIST, CERTIFIED REGISTERED

## 2021-01-28 PROCEDURE — 99223 1ST HOSP IP/OBS HIGH 75: CPT | Performed by: FAMILY MEDICINE

## 2021-01-28 PROCEDURE — 25010000002 ONDANSETRON PER 1 MG: Performed by: NURSE ANESTHETIST, CERTIFIED REGISTERED

## 2021-01-28 PROCEDURE — 85610 PROTHROMBIN TIME: CPT | Performed by: EMERGENCY MEDICINE

## 2021-01-28 PROCEDURE — C1769 GUIDE WIRE: HCPCS | Performed by: ORTHOPAEDIC SURGERY

## 2021-01-28 PROCEDURE — 87636 SARSCOV2 & INF A&B AMP PRB: CPT | Performed by: EMERGENCY MEDICINE

## 2021-01-28 PROCEDURE — 25010000003 CEFAZOLIN IN DEXTROSE 2-4 GM/100ML-% SOLUTION: Performed by: ORTHOPAEDIC SURGERY

## 2021-01-28 PROCEDURE — 76000 FLUOROSCOPY <1 HR PHYS/QHP: CPT

## 2021-01-28 PROCEDURE — 93005 ELECTROCARDIOGRAM TRACING: CPT | Performed by: EMERGENCY MEDICINE

## 2021-01-28 PROCEDURE — 25010000002 HYDROMORPHONE PER 4 MG: Performed by: ORTHOPAEDIC SURGERY

## 2021-01-28 PROCEDURE — 73502 X-RAY EXAM HIP UNI 2-3 VIEWS: CPT

## 2021-01-28 PROCEDURE — 25010000002 HYDROMORPHONE PER 4 MG: Performed by: NURSE ANESTHETIST, CERTIFIED REGISTERED

## 2021-01-28 PROCEDURE — 25010000002 FENTANYL CITRATE (PF) 100 MCG/2ML SOLUTION: Performed by: EMERGENCY MEDICINE

## 2021-01-28 PROCEDURE — 0QS606Z REPOSITION RIGHT UPPER FEMUR WITH INTRAMEDULLARY INTERNAL FIXATION DEVICE, OPEN APPROACH: ICD-10-PCS | Performed by: ORTHOPAEDIC SURGERY

## 2021-01-28 PROCEDURE — 25010000002 DEXAMETHASONE PER 1 MG: Performed by: NURSE ANESTHETIST, CERTIFIED REGISTERED

## 2021-01-28 DEVICE — NAIL FEM TFN ADV PROX 130D SHT 11X170MM STRL: Type: IMPLANTABLE DEVICE | Site: HIP | Status: FUNCTIONAL

## 2021-01-28 DEVICE — BLD FEM FIX HELI TFN ADV PERF 95MM STRL: Type: IMPLANTABLE DEVICE | Site: HIP | Status: FUNCTIONAL

## 2021-01-28 DEVICE — SCRW LK STRDRV TI 5X36MM STRL: Type: IMPLANTABLE DEVICE | Site: HIP | Status: FUNCTIONAL

## 2021-01-28 RX ORDER — FENTANYL CITRATE 50 UG/ML
50 INJECTION, SOLUTION INTRAMUSCULAR; INTRAVENOUS
Status: DISCONTINUED | OUTPATIENT
Start: 2021-01-28 | End: 2021-01-28 | Stop reason: HOSPADM

## 2021-01-28 RX ORDER — CEFAZOLIN SODIUM 2 G/100ML
2 INJECTION, SOLUTION INTRAVENOUS EVERY 8 HOURS
Status: COMPLETED | OUTPATIENT
Start: 2021-01-28 | End: 2021-01-29

## 2021-01-28 RX ORDER — IPRATROPIUM BROMIDE AND ALBUTEROL SULFATE 2.5; .5 MG/3ML; MG/3ML
3 SOLUTION RESPIRATORY (INHALATION) ONCE AS NEEDED
Status: DISCONTINUED | OUTPATIENT
Start: 2021-01-28 | End: 2021-01-28 | Stop reason: HOSPADM

## 2021-01-28 RX ORDER — FAMOTIDINE 10 MG/ML
20 INJECTION, SOLUTION INTRAVENOUS
Status: COMPLETED | OUTPATIENT
Start: 2021-01-28 | End: 2021-01-28

## 2021-01-28 RX ORDER — BUPIVACAINE HCL/0.9 % NACL/PF 0.125 %
PLASTIC BAG, INJECTION (ML) EPIDURAL AS NEEDED
Status: DISCONTINUED | OUTPATIENT
Start: 2021-01-28 | End: 2021-01-28 | Stop reason: SURG

## 2021-01-28 RX ORDER — DEXAMETHASONE SODIUM PHOSPHATE 4 MG/ML
INJECTION, SOLUTION INTRA-ARTICULAR; INTRALESIONAL; INTRAMUSCULAR; INTRAVENOUS; SOFT TISSUE AS NEEDED
Status: DISCONTINUED | OUTPATIENT
Start: 2021-01-28 | End: 2021-01-28 | Stop reason: SURG

## 2021-01-28 RX ORDER — HYDROCODONE BITARTRATE AND ACETAMINOPHEN 5; 325 MG/1; MG/1
1 TABLET ORAL ONCE AS NEEDED
Status: DISCONTINUED | OUTPATIENT
Start: 2021-01-28 | End: 2021-01-28 | Stop reason: HOSPADM

## 2021-01-28 RX ORDER — SODIUM CHLORIDE 0.9 % (FLUSH) 0.9 %
10 SYRINGE (ML) INJECTION EVERY 12 HOURS SCHEDULED
Status: DISCONTINUED | OUTPATIENT
Start: 2021-01-28 | End: 2021-02-01 | Stop reason: HOSPADM

## 2021-01-28 RX ORDER — SODIUM CHLORIDE 0.9 % (FLUSH) 0.9 %
10 SYRINGE (ML) INJECTION AS NEEDED
Status: DISCONTINUED | OUTPATIENT
Start: 2021-01-28 | End: 2021-02-01 | Stop reason: HOSPADM

## 2021-01-28 RX ORDER — SODIUM CHLORIDE 9 MG/ML
50 INJECTION, SOLUTION INTRAVENOUS CONTINUOUS
Status: DISCONTINUED | OUTPATIENT
Start: 2021-01-28 | End: 2021-02-01 | Stop reason: HOSPADM

## 2021-01-28 RX ORDER — LIDOCAINE HYDROCHLORIDE 10 MG/ML
0.5 INJECTION, SOLUTION EPIDURAL; INFILTRATION; INTRACAUDAL; PERINEURAL ONCE AS NEEDED
Status: COMPLETED | OUTPATIENT
Start: 2021-01-28 | End: 2021-01-28

## 2021-01-28 RX ORDER — FENTANYL CITRATE 50 UG/ML
50 INJECTION, SOLUTION INTRAMUSCULAR; INTRAVENOUS
Status: COMPLETED | OUTPATIENT
Start: 2021-01-28 | End: 2021-01-28

## 2021-01-28 RX ORDER — HYDROCODONE BITARTRATE AND ACETAMINOPHEN 7.5; 325 MG/1; MG/1
1 TABLET ORAL EVERY 4 HOURS PRN
Status: DISCONTINUED | OUTPATIENT
Start: 2021-01-28 | End: 2021-02-01 | Stop reason: HOSPADM

## 2021-01-28 RX ORDER — ONDANSETRON 2 MG/ML
INJECTION INTRAMUSCULAR; INTRAVENOUS AS NEEDED
Status: DISCONTINUED | OUTPATIENT
Start: 2021-01-28 | End: 2021-01-28 | Stop reason: SURG

## 2021-01-28 RX ORDER — SODIUM CHLORIDE 9 MG/ML
9 INJECTION, SOLUTION INTRAVENOUS CONTINUOUS PRN
Status: DISCONTINUED | OUTPATIENT
Start: 2021-01-28 | End: 2021-02-01 | Stop reason: HOSPADM

## 2021-01-28 RX ORDER — LIDOCAINE HYDROCHLORIDE 10 MG/ML
INJECTION, SOLUTION EPIDURAL; INFILTRATION; INTRACAUDAL; PERINEURAL AS NEEDED
Status: DISCONTINUED | OUTPATIENT
Start: 2021-01-28 | End: 2021-01-28 | Stop reason: SURG

## 2021-01-28 RX ORDER — IPRATROPIUM BROMIDE AND ALBUTEROL SULFATE 2.5; .5 MG/3ML; MG/3ML
3 SOLUTION RESPIRATORY (INHALATION) EVERY 6 HOURS PRN
Status: DISCONTINUED | OUTPATIENT
Start: 2021-01-28 | End: 2021-02-01 | Stop reason: HOSPADM

## 2021-01-28 RX ORDER — CYCLOBENZAPRINE HCL 5 MG
5 TABLET ORAL DAILY PRN
Status: CANCELLED | OUTPATIENT
Start: 2021-01-28

## 2021-01-28 RX ORDER — SODIUM CHLORIDE 0.9 % (FLUSH) 0.9 %
10 SYRINGE (ML) INJECTION EVERY 12 HOURS SCHEDULED
Status: DISCONTINUED | OUTPATIENT
Start: 2021-01-28 | End: 2021-01-28 | Stop reason: HOSPADM

## 2021-01-28 RX ORDER — CEFAZOLIN SODIUM 2 G/100ML
2 INJECTION, SOLUTION INTRAVENOUS ONCE
Status: COMPLETED | OUTPATIENT
Start: 2021-01-28 | End: 2021-01-28

## 2021-01-28 RX ORDER — ACETAMINOPHEN 650 MG/1
650 SUPPOSITORY RECTAL EVERY 4 HOURS PRN
Status: DISCONTINUED | OUTPATIENT
Start: 2021-01-28 | End: 2021-02-01 | Stop reason: HOSPADM

## 2021-01-28 RX ORDER — ROCURONIUM BROMIDE 10 MG/ML
INJECTION, SOLUTION INTRAVENOUS AS NEEDED
Status: DISCONTINUED | OUTPATIENT
Start: 2021-01-28 | End: 2021-01-28 | Stop reason: SURG

## 2021-01-28 RX ORDER — SODIUM CHLORIDE 0.9 % (FLUSH) 0.9 %
3-10 SYRINGE (ML) INJECTION AS NEEDED
Status: DISCONTINUED | OUTPATIENT
Start: 2021-01-28 | End: 2021-01-28 | Stop reason: HOSPADM

## 2021-01-28 RX ORDER — ONDANSETRON 4 MG/1
4 TABLET, FILM COATED ORAL EVERY 6 HOURS PRN
Status: DISCONTINUED | OUTPATIENT
Start: 2021-01-28 | End: 2021-02-01 | Stop reason: HOSPADM

## 2021-01-28 RX ORDER — DROPERIDOL 2.5 MG/ML
0.62 INJECTION, SOLUTION INTRAMUSCULAR; INTRAVENOUS AS NEEDED
Status: DISCONTINUED | OUTPATIENT
Start: 2021-01-28 | End: 2021-01-28 | Stop reason: HOSPADM

## 2021-01-28 RX ORDER — ROPIVACAINE HYDROCHLORIDE 5 MG/ML
INJECTION, SOLUTION EPIDURAL; INFILTRATION; PERINEURAL
Status: COMPLETED | OUTPATIENT
Start: 2021-01-28 | End: 2021-01-28

## 2021-01-28 RX ORDER — FOLIC ACID 1 MG/1
1000 TABLET ORAL DAILY
Status: DISCONTINUED | OUTPATIENT
Start: 2021-01-29 | End: 2021-02-01 | Stop reason: HOSPADM

## 2021-01-28 RX ORDER — PROPOFOL 10 MG/ML
VIAL (ML) INTRAVENOUS AS NEEDED
Status: DISCONTINUED | OUTPATIENT
Start: 2021-01-28 | End: 2021-01-28 | Stop reason: SURG

## 2021-01-28 RX ORDER — ACETAMINOPHEN 160 MG/5ML
650 SOLUTION ORAL EVERY 4 HOURS PRN
Status: DISCONTINUED | OUTPATIENT
Start: 2021-01-28 | End: 2021-02-01 | Stop reason: HOSPADM

## 2021-01-28 RX ORDER — PANTOPRAZOLE SODIUM 40 MG/1
40 TABLET, DELAYED RELEASE ORAL 2 TIMES DAILY
Status: DISCONTINUED | OUTPATIENT
Start: 2021-01-28 | End: 2021-02-01 | Stop reason: HOSPADM

## 2021-01-28 RX ORDER — TAMSULOSIN HYDROCHLORIDE 0.4 MG/1
0.4 CAPSULE ORAL DAILY
Status: DISCONTINUED | OUTPATIENT
Start: 2021-01-29 | End: 2021-02-01 | Stop reason: HOSPADM

## 2021-01-28 RX ORDER — METHOCARBAMOL 750 MG/1
750 TABLET, FILM COATED ORAL EVERY 8 HOURS PRN
Status: DISCONTINUED | OUTPATIENT
Start: 2021-01-28 | End: 2021-01-28

## 2021-01-28 RX ORDER — SODIUM CHLORIDE 0.9 % (FLUSH) 0.9 %
3 SYRINGE (ML) INJECTION EVERY 12 HOURS SCHEDULED
Status: DISCONTINUED | OUTPATIENT
Start: 2021-01-28 | End: 2021-01-28 | Stop reason: HOSPADM

## 2021-01-28 RX ORDER — NICOTINE 21 MG/24HR
1 PATCH, TRANSDERMAL 24 HOURS TRANSDERMAL
Status: DISCONTINUED | OUTPATIENT
Start: 2021-01-28 | End: 2021-02-01 | Stop reason: HOSPADM

## 2021-01-28 RX ORDER — ROPIVACAINE HYDROCHLORIDE 2 MG/ML
8 INJECTION, SOLUTION EPIDURAL; INFILTRATION CONTINUOUS
Status: DISCONTINUED | OUTPATIENT
Start: 2021-01-28 | End: 2021-02-01 | Stop reason: HOSPADM

## 2021-01-28 RX ORDER — ONDANSETRON 2 MG/ML
4 INJECTION INTRAMUSCULAR; INTRAVENOUS EVERY 6 HOURS PRN
Status: DISCONTINUED | OUTPATIENT
Start: 2021-01-28 | End: 2021-02-01 | Stop reason: HOSPADM

## 2021-01-28 RX ORDER — SODIUM CHLORIDE 0.9 % (FLUSH) 0.9 %
10 SYRINGE (ML) INJECTION AS NEEDED
Status: DISCONTINUED | OUTPATIENT
Start: 2021-01-28 | End: 2021-01-28 | Stop reason: HOSPADM

## 2021-01-28 RX ORDER — ACETAMINOPHEN 325 MG/1
650 TABLET ORAL EVERY 4 HOURS PRN
Status: DISCONTINUED | OUTPATIENT
Start: 2021-01-28 | End: 2021-02-01 | Stop reason: HOSPADM

## 2021-01-28 RX ORDER — HYDROMORPHONE HYDROCHLORIDE 1 MG/ML
0.5 INJECTION, SOLUTION INTRAMUSCULAR; INTRAVENOUS; SUBCUTANEOUS
Status: DISCONTINUED | OUTPATIENT
Start: 2021-01-28 | End: 2021-01-28 | Stop reason: HOSPADM

## 2021-01-28 RX ORDER — NALOXONE HCL 0.4 MG/ML
0.4 VIAL (ML) INJECTION AS NEEDED
Status: DISCONTINUED | OUTPATIENT
Start: 2021-01-28 | End: 2021-01-28 | Stop reason: HOSPADM

## 2021-01-28 RX ORDER — DROPERIDOL 2.5 MG/ML
0.62 INJECTION, SOLUTION INTRAMUSCULAR; INTRAVENOUS ONCE AS NEEDED
Status: DISCONTINUED | OUTPATIENT
Start: 2021-01-28 | End: 2021-01-28 | Stop reason: HOSPADM

## 2021-01-28 RX ORDER — NALOXONE HCL 0.4 MG/ML
0.4 VIAL (ML) INJECTION
Status: DISCONTINUED | OUTPATIENT
Start: 2021-01-28 | End: 2021-02-01 | Stop reason: HOSPADM

## 2021-01-28 RX ORDER — HYDROMORPHONE HYDROCHLORIDE 1 MG/ML
0.5 INJECTION, SOLUTION INTRAMUSCULAR; INTRAVENOUS; SUBCUTANEOUS
Status: DISCONTINUED | OUTPATIENT
Start: 2021-01-28 | End: 2021-02-01 | Stop reason: HOSPADM

## 2021-01-28 RX ORDER — SODIUM CHLORIDE, SODIUM LACTATE, POTASSIUM CHLORIDE, CALCIUM CHLORIDE 600; 310; 30; 20 MG/100ML; MG/100ML; MG/100ML; MG/100ML
INJECTION, SOLUTION INTRAVENOUS CONTINUOUS PRN
Status: DISCONTINUED | OUTPATIENT
Start: 2021-01-28 | End: 2021-01-28 | Stop reason: SURG

## 2021-01-28 RX ORDER — ONDANSETRON 2 MG/ML
4 INJECTION INTRAMUSCULAR; INTRAVENOUS ONCE AS NEEDED
Status: DISCONTINUED | OUTPATIENT
Start: 2021-01-28 | End: 2021-01-28 | Stop reason: HOSPADM

## 2021-01-28 RX ORDER — FENTANYL CITRATE 50 UG/ML
INJECTION, SOLUTION INTRAMUSCULAR; INTRAVENOUS AS NEEDED
Status: DISCONTINUED | OUTPATIENT
Start: 2021-01-28 | End: 2021-01-28 | Stop reason: SURG

## 2021-01-28 RX ADMIN — CEFAZOLIN 2 G: 10 INJECTION, POWDER, FOR SOLUTION INTRAVENOUS at 19:53

## 2021-01-28 RX ADMIN — PANTOPRAZOLE SODIUM 40 MG: 40 TABLET, DELAYED RELEASE ORAL at 19:53

## 2021-01-28 RX ADMIN — Medication 120 MCG: at 15:11

## 2021-01-28 RX ADMIN — Medication 80 MCG: at 15:08

## 2021-01-28 RX ADMIN — PROPOFOL 200 MG: 10 INJECTION, EMULSION INTRAVENOUS at 14:54

## 2021-01-28 RX ADMIN — HYDROMORPHONE HYDROCHLORIDE 0.5 MG: 1 INJECTION, SOLUTION INTRAMUSCULAR; INTRAVENOUS; SUBCUTANEOUS at 19:53

## 2021-01-28 RX ADMIN — Medication 1000 MG: at 15:05

## 2021-01-28 RX ADMIN — FENTANYL CITRATE 50 MCG: 50 INJECTION, SOLUTION INTRAMUSCULAR; INTRAVENOUS at 08:40

## 2021-01-28 RX ADMIN — HYDROCODONE BITARTRATE AND ACETAMINOPHEN 1 TABLET: 7.5; 325 TABLET ORAL at 17:07

## 2021-01-28 RX ADMIN — Medication 1 PATCH: at 23:01

## 2021-01-28 RX ADMIN — FENTANYL CITRATE 100 MCG: 50 INJECTION, SOLUTION INTRAMUSCULAR; INTRAVENOUS at 14:54

## 2021-01-28 RX ADMIN — DEXAMETHASONE SODIUM PHOSPHATE 4 MG: 4 INJECTION, SOLUTION INTRA-ARTICULAR; INTRALESIONAL; INTRAMUSCULAR; INTRAVENOUS; SOFT TISSUE at 15:00

## 2021-01-28 RX ADMIN — FAMOTIDINE 20 MG: 10 INJECTION INTRAVENOUS at 13:07

## 2021-01-28 RX ADMIN — LIDOCAINE HYDROCHLORIDE 50 MG: 10 INJECTION, SOLUTION EPIDURAL; INFILTRATION; INTRACAUDAL; PERINEURAL at 14:54

## 2021-01-28 RX ADMIN — METHOCARBAMOL 750 MG: 750 TABLET ORAL at 14:19

## 2021-01-28 RX ADMIN — ROCURONIUM BROMIDE 40 MG: 10 INJECTION INTRAVENOUS at 14:54

## 2021-01-28 RX ADMIN — SODIUM CHLORIDE 9 ML/HR: 9 INJECTION, SOLUTION INTRAVENOUS at 13:07

## 2021-01-28 RX ADMIN — SODIUM CHLORIDE, POTASSIUM CHLORIDE, SODIUM LACTATE AND CALCIUM CHLORIDE: 600; 310; 30; 20 INJECTION, SOLUTION INTRAVENOUS at 14:48

## 2021-01-28 RX ADMIN — CEFAZOLIN SODIUM 2 G: 2 INJECTION, SOLUTION INTRAVENOUS at 15:00

## 2021-01-28 RX ADMIN — ONDANSETRON 4 MG: 2 INJECTION INTRAMUSCULAR; INTRAVENOUS at 15:30

## 2021-01-28 RX ADMIN — LIDOCAINE HYDROCHLORIDE 0.5 ML: 10 INJECTION, SOLUTION EPIDURAL; INFILTRATION; INTRACAUDAL; PERINEURAL at 13:07

## 2021-01-28 RX ADMIN — FENTANYL CITRATE 50 MCG: 50 INJECTION, SOLUTION INTRAMUSCULAR; INTRAVENOUS at 10:31

## 2021-01-28 RX ADMIN — HYDROMORPHONE HYDROCHLORIDE 0.5 MG: 1 INJECTION, SOLUTION INTRAMUSCULAR; INTRAVENOUS; SUBCUTANEOUS at 16:33

## 2021-01-28 RX ADMIN — ROPIVACAINE HYDROCHLORIDE 8 ML/HR: 2 INJECTION, SOLUTION EPIDURAL; INFILTRATION at 15:58

## 2021-01-28 RX ADMIN — HYDROMORPHONE HYDROCHLORIDE 0.5 MG: 1 INJECTION, SOLUTION INTRAMUSCULAR; INTRAVENOUS; SUBCUTANEOUS at 16:07

## 2021-01-28 RX ADMIN — Medication 1000 MG: at 15:30

## 2021-01-28 RX ADMIN — ROPIVACAINE HYDROCHLORIDE 25 ML: 5 INJECTION, SOLUTION EPIDURAL; INFILTRATION; PERINEURAL at 13:45

## 2021-01-28 NOTE — ANESTHESIA PROCEDURE NOTES
Peripheral Block    Pre-sedation assessment completed: 1/28/2021 1:30 PM    Patient reassessed immediately prior to procedure    Patient location during procedure: pre-op  Reason for block: procedure for pain and at surgeon's request  Performed by  CRNA: Enoch Miguel CRNA  Assisted by: Arabella Echeverria RN  Preanesthetic Checklist  Completed: patient identified, site marked, surgical consent, pre-op evaluation, timeout performed, IV checked, risks and benefits discussed and monitors and equipment checked  Prep:  Pt Position: supine  Sterile barriers:cap, gloves and mask  Prep: ChloraPrep  Patient monitoring: blood pressure monitoring, continuous pulse oximetry and EKG  Procedure  Sedation:no  Performed under: local infiltration  Guidance:ultrasound guided  Images:still images obtained, printed/placed on chart    Laterality:right  Block Type:fascia iliaca catheter  Injection Technique:catheter  Needle Type:echogenic  Needle Gauge:18 G  Resistance on Injection: none  Catheter Size:20 G (20g)  Cath Depth at skin: 12 cm    Medications Used: ropivacaine (NAROPIN) 0.5 % injection, 25 mL  Med admintered at 1/28/2021 1:45 PM      Medications  Preservative Free Saline:15ml    Post Assessment  Injection Assessment: negative aspiration for heme, no paresthesia on injection and incremental injection  Patient Tolerance:comfortable throughout block  Complications:no  Additional Notes  Procedure:                 Pt placed in supine position.   The insertion site was prepped in sterile fashion with Chlorapreop and clear plastic drapes.  Analgesia was provided by skin infiltration at insertion site with Lidocaine 1% 3mls.  A B-Galindo 18 g , 4 inch echogenic Touhy needle was advance In-plane under ultrasound guidance. The   Anterior superior Iliac crest was initially visualized and the probe was directed slightly medially and slightly towards the umbilicus.  The course of the needle was tracked over the sartorius muscle through  the fascia Iliacus and into the anterior portion of the Iliacus muscle.  Major vessels where identified and avoided as where structures of the peritoneal cavity.  LA injection was made incrementally in 1-5ml amounts spread was visualized superiorly below fascia iliacus.  Injection was completed with negative aspiration of blood and negative intravascular injection.  Injection pressures where normal or minimal resistance.  A 20 g B-Galindo wire styleted catheter was then advance thru the needle and very easily placed in a superior or cephalad direction.  The catheter was secured at insertion site with skin afix , mastisol, steristreps.  A CHG tegaderm dressing was placed over the insertion site and the nerve catheter labeled and capped.  Thank You.

## 2021-01-28 NOTE — ANESTHESIA PREPROCEDURE EVALUATION
Anesthesia Evaluation                  Airway   Mallampati: II  Dental      Pulmonary    Cardiovascular     (+) hypertension,       Neuro/Psych  GI/Hepatic/Renal/Endo    (+)  GERD,  hepatitis, liver disease,     Musculoskeletal     Abdominal    Substance History      OB/GYN          Other                        Anesthesia Plan    ASA 3     general with block     intravenous induction     Anesthetic plan, all risks, benefits, and alternatives have been provided, discussed and informed consent has been obtained with: patient.

## 2021-01-28 NOTE — ANESTHESIA PROCEDURE NOTES
Airway  Urgency: elective    Date/Time: 1/28/2021 2:55 PM  Airway not difficult    General Information and Staff    Patient location during procedure: OR    Indications and Patient Condition  Indications for airway management: airway protection    Preoxygenated: yes  MILS not maintained throughout  Mask difficulty assessment: 1 - vent by mask    Final Airway Details  Final airway type: endotracheal airway      Successful airway: ETT  Cuffed: yes   Successful intubation technique: direct laryngoscopy  Facilitating devices/methods: intubating stylet  Endotracheal tube insertion site: oral  Blade: Santizo  Blade size: 2  ETT size (mm): 7.5  Cormack-Lehane Classification: grade I - full view of glottis  Placement verified by: chest auscultation and capnometry   Measured from: lips  ETT/EBT  to lips (cm): 23  Number of attempts at approach: 1  Assessment: lips, teeth, and gum same as pre-op and atraumatic intubation    Additional Comments  Negative epigastric sounds, Breath sound equal bilaterally with symmetric chest rise and fall

## 2021-01-29 PROBLEM — K21.9 GERD (GASTROESOPHAGEAL REFLUX DISEASE): Status: ACTIVE | Noted: 2021-01-29

## 2021-01-29 LAB
ANION GAP SERPL CALCULATED.3IONS-SCNC: 10 MMOL/L (ref 5–15)
BUN SERPL-MCNC: 9 MG/DL (ref 8–23)
BUN/CREAT SERPL: 13 (ref 7–25)
CALCIUM SPEC-SCNC: 8 MG/DL (ref 8.6–10.5)
CHLORIDE SERPL-SCNC: 101 MMOL/L (ref 98–107)
CO2 SERPL-SCNC: 23 MMOL/L (ref 22–29)
CREAT SERPL-MCNC: 0.69 MG/DL (ref 0.76–1.27)
DEPRECATED RDW RBC AUTO: 51.7 FL (ref 37–54)
ERYTHROCYTE [DISTWIDTH] IN BLOOD BY AUTOMATED COUNT: 12.3 % (ref 12.3–15.4)
GFR SERPL CREATININE-BSD FRML MDRD: 113 ML/MIN/1.73
GLUCOSE SERPL-MCNC: 131 MG/DL (ref 65–99)
HCT VFR BLD AUTO: 39.5 % (ref 37.5–51)
HGB BLD-MCNC: 12.4 G/DL (ref 13–17.7)
MAGNESIUM SERPL-MCNC: 1.7 MG/DL (ref 1.6–2.4)
MCH RBC QN AUTO: 34.9 PG (ref 26.6–33)
MCHC RBC AUTO-ENTMCNC: 31.4 G/DL (ref 31.5–35.7)
MCV RBC AUTO: 111.3 FL (ref 79–97)
PLATELET # BLD AUTO: 244 10*3/MM3 (ref 140–450)
PMV BLD AUTO: 11.1 FL (ref 6–12)
POTASSIUM SERPL-SCNC: 4.3 MMOL/L (ref 3.5–5.2)
RBC # BLD AUTO: 3.55 10*6/MM3 (ref 4.14–5.8)
SODIUM SERPL-SCNC: 134 MMOL/L (ref 136–145)
WBC # BLD AUTO: 13.16 10*3/MM3 (ref 3.4–10.8)

## 2021-01-29 PROCEDURE — 85027 COMPLETE CBC AUTOMATED: CPT | Performed by: ORTHOPAEDIC SURGERY

## 2021-01-29 PROCEDURE — 97161 PT EVAL LOW COMPLEX 20 MIN: CPT

## 2021-01-29 PROCEDURE — 25010000002 ROPIVACAINE PER 1 MG: Performed by: ORTHOPAEDIC SURGERY

## 2021-01-29 PROCEDURE — 97535 SELF CARE MNGMENT TRAINING: CPT

## 2021-01-29 PROCEDURE — 99233 SBSQ HOSP IP/OBS HIGH 50: CPT | Performed by: HOSPITALIST

## 2021-01-29 PROCEDURE — 83735 ASSAY OF MAGNESIUM: CPT | Performed by: ORTHOPAEDIC SURGERY

## 2021-01-29 PROCEDURE — 97110 THERAPEUTIC EXERCISES: CPT

## 2021-01-29 PROCEDURE — 25010000002 HYDROMORPHONE PER 4 MG: Performed by: ORTHOPAEDIC SURGERY

## 2021-01-29 PROCEDURE — 25010000002 CEFAZOLIN PER 500 MG: Performed by: ORTHOPAEDIC SURGERY

## 2021-01-29 PROCEDURE — 80048 BASIC METABOLIC PNL TOTAL CA: CPT | Performed by: ORTHOPAEDIC SURGERY

## 2021-01-29 PROCEDURE — 97165 OT EVAL LOW COMPLEX 30 MIN: CPT

## 2021-01-29 PROCEDURE — 25010000002 ONDANSETRON PER 1 MG: Performed by: ORTHOPAEDIC SURGERY

## 2021-01-29 PROCEDURE — 25010000002 HYDROMORPHONE 1 MG/ML SOLUTION: Performed by: ORTHOPAEDIC SURGERY

## 2021-01-29 RX ADMIN — Medication 1 PATCH: at 08:39

## 2021-01-29 RX ADMIN — ONDANSETRON 4 MG: 2 INJECTION INTRAMUSCULAR; INTRAVENOUS at 00:32

## 2021-01-29 RX ADMIN — HYDROCODONE BITARTRATE AND ACETAMINOPHEN 1 TABLET: 7.5; 325 TABLET ORAL at 20:01

## 2021-01-29 RX ADMIN — PANTOPRAZOLE SODIUM 40 MG: 40 TABLET, DELAYED RELEASE ORAL at 08:39

## 2021-01-29 RX ADMIN — HYDROMORPHONE HYDROCHLORIDE 0.5 MG: 1 INJECTION, SOLUTION INTRAMUSCULAR; INTRAVENOUS; SUBCUTANEOUS at 01:43

## 2021-01-29 RX ADMIN — FOLIC ACID 1000 MCG: 1 TABLET ORAL at 08:39

## 2021-01-29 RX ADMIN — CEFAZOLIN 2 G: 10 INJECTION, POWDER, FOR SOLUTION INTRAVENOUS at 05:08

## 2021-01-29 RX ADMIN — HYDROMORPHONE HYDROCHLORIDE 0.5 MG: 1 INJECTION, SOLUTION INTRAMUSCULAR; INTRAVENOUS; SUBCUTANEOUS at 11:00

## 2021-01-29 RX ADMIN — ROPIVACAINE HYDROCHLORIDE 8 ML/HR: 2 INJECTION, SOLUTION EPIDURAL; INFILTRATION at 14:55

## 2021-01-29 RX ADMIN — PANTOPRAZOLE SODIUM 40 MG: 40 TABLET, DELAYED RELEASE ORAL at 20:01

## 2021-01-29 RX ADMIN — HYDROCODONE BITARTRATE AND ACETAMINOPHEN 1 TABLET: 7.5; 325 TABLET ORAL at 15:56

## 2021-01-29 NOTE — ANESTHESIA POSTPROCEDURE EVALUATION
Patient: Ubaldo Mcleod    Procedure Summary     Date: 01/28/21 Room / Location:  [facility]  [facility]    Anesthesia Start: 1448 Anesthesia Stop: 1556    Procedure: HIP TROCHANTERIC NAILING WITH INTRAMEDULLARY HIP SCREW RIGHT (Right Hip) Diagnosis:     Surgeon: Brenden Sharp Jr., MD Provider: Balta Buenrostro MD    Anesthesia Type: general with block ASA Status: 3          Anesthesia Type: general with block    Vitals  Vitals Value Taken Time   /92 01/28/21 1615   Temp     Pulse 103 01/28/21 1624   Resp 18 01/28/21 1615   SpO2 94 % 01/28/21 1624   Vitals shown include unvalidated device data.        Post Anesthesia Care and Evaluation    Patient location during evaluation: PACU  Patient participation: complete - patient participated  Level of consciousness: awake and alert  Pain management: adequate  Airway patency: patent  Anesthetic complications: No anesthetic complications  PONV Status: none  Cardiovascular status: hemodynamically stable and acceptable  Respiratory status: nonlabored ventilation, acceptable and nasal cannula  Hydration status: acceptable

## 2021-01-30 PROBLEM — E53.8 B12 DEFICIENCY: Status: ACTIVE | Noted: 2021-01-30

## 2021-01-30 PROBLEM — G62.9 PERIPHERAL NEUROPATHY: Status: ACTIVE | Noted: 2021-01-30

## 2021-01-30 LAB
ALBUMIN SERPL-MCNC: 3.1 G/DL (ref 3.5–5.2)
ALBUMIN/GLOB SERPL: 1.3 G/DL
ALP SERPL-CCNC: 278 U/L (ref 39–117)
ALT SERPL W P-5'-P-CCNC: 16 U/L (ref 1–41)
ANION GAP SERPL CALCULATED.3IONS-SCNC: 7 MMOL/L (ref 5–15)
AST SERPL-CCNC: 46 U/L (ref 1–40)
BILIRUB SERPL-MCNC: 3.3 MG/DL (ref 0–1.2)
BUN SERPL-MCNC: 10 MG/DL (ref 8–23)
BUN/CREAT SERPL: 13.7 (ref 7–25)
CALCIUM SPEC-SCNC: 8.3 MG/DL (ref 8.6–10.5)
CHLORIDE SERPL-SCNC: 102 MMOL/L (ref 98–107)
CO2 SERPL-SCNC: 26 MMOL/L (ref 22–29)
CREAT SERPL-MCNC: 0.73 MG/DL (ref 0.76–1.27)
DEPRECATED RDW RBC AUTO: 50.7 FL (ref 37–54)
ERYTHROCYTE [DISTWIDTH] IN BLOOD BY AUTOMATED COUNT: 12.5 % (ref 12.3–15.4)
GFR SERPL CREATININE-BSD FRML MDRD: 106 ML/MIN/1.73
GLOBULIN UR ELPH-MCNC: 2.4 GM/DL
GLUCOSE SERPL-MCNC: 113 MG/DL (ref 65–99)
HCT VFR BLD AUTO: 37.6 % (ref 37.5–51)
HGB BLD-MCNC: 12.1 G/DL (ref 13–17.7)
MCH RBC QN AUTO: 35.5 PG (ref 26.6–33)
MCHC RBC AUTO-ENTMCNC: 32.2 G/DL (ref 31.5–35.7)
MCV RBC AUTO: 110.3 FL (ref 79–97)
PLATELET # BLD AUTO: 236 10*3/MM3 (ref 140–450)
PMV BLD AUTO: 11.1 FL (ref 6–12)
POTASSIUM SERPL-SCNC: 4 MMOL/L (ref 3.5–5.2)
PROT SERPL-MCNC: 5.5 G/DL (ref 6–8.5)
QT INTERVAL: 386 MS
QTC INTERVAL: 461 MS
RBC # BLD AUTO: 3.41 10*6/MM3 (ref 4.14–5.8)
SODIUM SERPL-SCNC: 135 MMOL/L (ref 136–145)
WBC # BLD AUTO: 10.06 10*3/MM3 (ref 3.4–10.8)

## 2021-01-30 PROCEDURE — 25010000002 ROPIVACAINE PER 1 MG: Performed by: ORTHOPAEDIC SURGERY

## 2021-01-30 PROCEDURE — 99233 SBSQ HOSP IP/OBS HIGH 50: CPT | Performed by: HOSPITALIST

## 2021-01-30 PROCEDURE — 25010000002 CYANOCOBALAMIN PER 1000 MCG: Performed by: HOSPITALIST

## 2021-01-30 PROCEDURE — 97110 THERAPEUTIC EXERCISES: CPT

## 2021-01-30 PROCEDURE — 85027 COMPLETE CBC AUTOMATED: CPT | Performed by: HOSPITALIST

## 2021-01-30 PROCEDURE — 99024 POSTOP FOLLOW-UP VISIT: CPT | Performed by: ORTHOPAEDIC SURGERY

## 2021-01-30 PROCEDURE — 80053 COMPREHEN METABOLIC PANEL: CPT | Performed by: HOSPITALIST

## 2021-01-30 RX ORDER — POLYETHYLENE GLYCOL 3350 17 G/17G
17 POWDER, FOR SOLUTION ORAL DAILY PRN
Status: DISCONTINUED | OUTPATIENT
Start: 2021-01-30 | End: 2021-02-01 | Stop reason: HOSPADM

## 2021-01-30 RX ORDER — CYANOCOBALAMIN 1000 UG/ML
1000 INJECTION, SOLUTION INTRAMUSCULAR; SUBCUTANEOUS ONCE
Status: COMPLETED | OUTPATIENT
Start: 2021-01-30 | End: 2021-01-30

## 2021-01-30 RX ORDER — DOCUSATE SODIUM 100 MG/1
100 CAPSULE, LIQUID FILLED ORAL 2 TIMES DAILY
Status: DISCONTINUED | OUTPATIENT
Start: 2021-01-30 | End: 2021-02-01 | Stop reason: HOSPADM

## 2021-01-30 RX ORDER — CYCLOBENZAPRINE HCL 10 MG
5 TABLET ORAL 3 TIMES DAILY PRN
Status: DISCONTINUED | OUTPATIENT
Start: 2021-01-30 | End: 2021-02-01 | Stop reason: HOSPADM

## 2021-01-30 RX ADMIN — HYDROCODONE BITARTRATE AND ACETAMINOPHEN 1 TABLET: 7.5; 325 TABLET ORAL at 08:27

## 2021-01-30 RX ADMIN — POLYETHYLENE GLYCOL 3350 17 G: 17 POWDER, FOR SOLUTION ORAL at 11:22

## 2021-01-30 RX ADMIN — ROPIVACAINE HYDROCHLORIDE 8 ML/HR: 2 INJECTION, SOLUTION EPIDURAL; INFILTRATION at 15:42

## 2021-01-30 RX ADMIN — TAMSULOSIN HYDROCHLORIDE 0.4 MG: 0.4 CAPSULE ORAL at 08:27

## 2021-01-30 RX ADMIN — PANTOPRAZOLE SODIUM 40 MG: 40 TABLET, DELAYED RELEASE ORAL at 08:27

## 2021-01-30 RX ADMIN — CYCLOBENZAPRINE HYDROCHLORIDE 5 MG: 10 TABLET, FILM COATED ORAL at 15:42

## 2021-01-30 RX ADMIN — ROPIVACAINE HYDROCHLORIDE 8 ML/HR: 2 INJECTION, SOLUTION EPIDURAL; INFILTRATION at 04:20

## 2021-01-30 RX ADMIN — PANTOPRAZOLE SODIUM 40 MG: 40 TABLET, DELAYED RELEASE ORAL at 20:49

## 2021-01-30 RX ADMIN — SODIUM CHLORIDE, PRESERVATIVE FREE 10 ML: 5 INJECTION INTRAVENOUS at 20:50

## 2021-01-30 RX ADMIN — DOCUSATE SODIUM 100 MG: 100 CAPSULE ORAL at 20:49

## 2021-01-30 RX ADMIN — Medication 1 PATCH: at 08:28

## 2021-01-30 RX ADMIN — DOCUSATE SODIUM 100 MG: 100 CAPSULE ORAL at 11:22

## 2021-01-30 RX ADMIN — FOLIC ACID 1000 MCG: 1 TABLET ORAL at 08:27

## 2021-01-30 RX ADMIN — CYANOCOBALAMIN 1000 MCG: 1000 INJECTION, SOLUTION INTRAMUSCULAR; SUBCUTANEOUS at 11:22

## 2021-01-31 LAB
ALBUMIN SERPL-MCNC: 3.2 G/DL (ref 3.5–5.2)
ALBUMIN/GLOB SERPL: 1.1 G/DL
ALP SERPL-CCNC: 308 U/L (ref 39–117)
ALT SERPL W P-5'-P-CCNC: 25 U/L (ref 1–41)
ANION GAP SERPL CALCULATED.3IONS-SCNC: 10 MMOL/L (ref 5–15)
AST SERPL-CCNC: 61 U/L (ref 1–40)
BILIRUB SERPL-MCNC: 4.3 MG/DL (ref 0–1.2)
BUN SERPL-MCNC: 7 MG/DL (ref 8–23)
BUN/CREAT SERPL: 9.7 (ref 7–25)
CALCIUM SPEC-SCNC: 8.8 MG/DL (ref 8.6–10.5)
CHLORIDE SERPL-SCNC: 100 MMOL/L (ref 98–107)
CO2 SERPL-SCNC: 25 MMOL/L (ref 22–29)
CREAT SERPL-MCNC: 0.72 MG/DL (ref 0.76–1.27)
DEPRECATED RDW RBC AUTO: 50.7 FL (ref 37–54)
ERYTHROCYTE [DISTWIDTH] IN BLOOD BY AUTOMATED COUNT: 12.5 % (ref 12.3–15.4)
GFR SERPL CREATININE-BSD FRML MDRD: 108 ML/MIN/1.73
GLOBULIN UR ELPH-MCNC: 2.8 GM/DL
GLUCOSE SERPL-MCNC: 107 MG/DL (ref 65–99)
HCT VFR BLD AUTO: 38.9 % (ref 37.5–51)
HGB BLD-MCNC: 12.5 G/DL (ref 13–17.7)
MCH RBC QN AUTO: 35.4 PG (ref 26.6–33)
MCHC RBC AUTO-ENTMCNC: 32.1 G/DL (ref 31.5–35.7)
MCV RBC AUTO: 110.2 FL (ref 79–97)
PLATELET # BLD AUTO: 262 10*3/MM3 (ref 140–450)
PMV BLD AUTO: 10.9 FL (ref 6–12)
POTASSIUM SERPL-SCNC: 4.1 MMOL/L (ref 3.5–5.2)
PROT SERPL-MCNC: 6 G/DL (ref 6–8.5)
RBC # BLD AUTO: 3.53 10*6/MM3 (ref 4.14–5.8)
SODIUM SERPL-SCNC: 135 MMOL/L (ref 136–145)
WBC # BLD AUTO: 9.75 10*3/MM3 (ref 3.4–10.8)

## 2021-01-31 PROCEDURE — 25010000002 ROPIVACAINE PER 1 MG: Performed by: ORTHOPAEDIC SURGERY

## 2021-01-31 PROCEDURE — 99024 POSTOP FOLLOW-UP VISIT: CPT | Performed by: ORTHOPAEDIC SURGERY

## 2021-01-31 PROCEDURE — 85027 COMPLETE CBC AUTOMATED: CPT | Performed by: HOSPITALIST

## 2021-01-31 PROCEDURE — 97110 THERAPEUTIC EXERCISES: CPT

## 2021-01-31 PROCEDURE — 80053 COMPREHEN METABOLIC PANEL: CPT | Performed by: HOSPITALIST

## 2021-01-31 PROCEDURE — 99232 SBSQ HOSP IP/OBS MODERATE 35: CPT | Performed by: HOSPITALIST

## 2021-01-31 PROCEDURE — 97116 GAIT TRAINING THERAPY: CPT

## 2021-01-31 RX ADMIN — PANTOPRAZOLE SODIUM 40 MG: 40 TABLET, DELAYED RELEASE ORAL at 19:51

## 2021-01-31 RX ADMIN — PANTOPRAZOLE SODIUM 40 MG: 40 TABLET, DELAYED RELEASE ORAL at 08:27

## 2021-01-31 RX ADMIN — Medication 1 PATCH: at 08:29

## 2021-01-31 RX ADMIN — POLYETHYLENE GLYCOL 3350 17 G: 17 POWDER, FOR SOLUTION ORAL at 12:40

## 2021-01-31 RX ADMIN — FOLIC ACID 1000 MCG: 1 TABLET ORAL at 08:27

## 2021-01-31 RX ADMIN — DOCUSATE SODIUM 100 MG: 100 CAPSULE ORAL at 19:50

## 2021-01-31 RX ADMIN — ROPIVACAINE HYDROCHLORIDE 8 ML/HR: 2 INJECTION, SOLUTION EPIDURAL; INFILTRATION at 15:57

## 2021-01-31 RX ADMIN — DOCUSATE SODIUM 100 MG: 100 CAPSULE ORAL at 08:26

## 2021-01-31 RX ADMIN — ROPIVACAINE HYDROCHLORIDE 8 ML/HR: 2 INJECTION, SOLUTION EPIDURAL; INFILTRATION at 02:48

## 2021-01-31 RX ADMIN — SODIUM CHLORIDE, PRESERVATIVE FREE 10 ML: 5 INJECTION INTRAVENOUS at 08:27

## 2021-02-01 VITALS
DIASTOLIC BLOOD PRESSURE: 88 MMHG | OXYGEN SATURATION: 95 % | TEMPERATURE: 97.9 F | RESPIRATION RATE: 16 BRPM | SYSTOLIC BLOOD PRESSURE: 138 MMHG | HEIGHT: 70 IN | HEART RATE: 93 BPM | BODY MASS INDEX: 25.48 KG/M2 | WEIGHT: 178 LBS

## 2021-02-01 PROCEDURE — 97116 GAIT TRAINING THERAPY: CPT

## 2021-02-01 PROCEDURE — 25010000002 ENOXAPARIN PER 10 MG: Performed by: NURSE PRACTITIONER

## 2021-02-01 PROCEDURE — 25010000002 ROPIVACAINE PER 1 MG: Performed by: ORTHOPAEDIC SURGERY

## 2021-02-01 PROCEDURE — 97110 THERAPEUTIC EXERCISES: CPT

## 2021-02-01 PROCEDURE — 99239 HOSP IP/OBS DSCHRG MGMT >30: CPT | Performed by: NURSE PRACTITIONER

## 2021-02-01 RX ORDER — CYCLOBENZAPRINE HCL 10 MG
5 TABLET ORAL 3 TIMES DAILY PRN
Start: 2021-02-01 | End: 2021-01-01 | Stop reason: SDUPTHER

## 2021-02-01 RX ORDER — BISACODYL 10 MG
10 SUPPOSITORY, RECTAL RECTAL DAILY
Status: DISCONTINUED | OUTPATIENT
Start: 2021-02-01 | End: 2021-02-01 | Stop reason: HOSPADM

## 2021-02-01 RX ORDER — POLYETHYLENE GLYCOL 3350 17 G/17G
17 POWDER, FOR SOLUTION ORAL DAILY PRN
Start: 2021-02-01

## 2021-02-01 RX ORDER — PSEUDOEPHEDRINE HCL 30 MG
100 TABLET ORAL 2 TIMES DAILY
Start: 2021-02-01 | End: 2021-03-24

## 2021-02-01 RX ORDER — ACETAMINOPHEN 325 MG/1
650 TABLET ORAL EVERY 4 HOURS PRN
Status: ON HOLD
Start: 2021-02-01 | End: 2021-01-01

## 2021-02-01 RX ORDER — HYDROCODONE BITARTRATE AND ACETAMINOPHEN 7.5; 325 MG/1; MG/1
1 TABLET ORAL EVERY 6 HOURS PRN
Start: 2021-02-01 | End: 2021-03-24

## 2021-02-01 RX ORDER — ASPIRIN 325 MG
325 TABLET, DELAYED RELEASE (ENTERIC COATED) ORAL DAILY
Start: 2021-02-15 | End: 2021-03-15

## 2021-02-01 RX ORDER — NICOTINE 21 MG/24HR
1 PATCH, TRANSDERMAL 24 HOURS TRANSDERMAL
Start: 2021-02-02 | End: 2021-03-24

## 2021-02-01 RX ADMIN — FOLIC ACID 1000 MCG: 1 TABLET ORAL at 08:35

## 2021-02-01 RX ADMIN — ENOXAPARIN SODIUM 40 MG: 40 INJECTION SUBCUTANEOUS at 11:07

## 2021-02-01 RX ADMIN — Medication 1 PATCH: at 08:36

## 2021-02-01 RX ADMIN — BISACODYL 10 MG: 10 SUPPOSITORY RECTAL at 09:24

## 2021-02-01 RX ADMIN — HYDROCODONE BITARTRATE AND ACETAMINOPHEN 1 TABLET: 7.5; 325 TABLET ORAL at 01:54

## 2021-02-01 RX ADMIN — PANTOPRAZOLE SODIUM 40 MG: 40 TABLET, DELAYED RELEASE ORAL at 08:37

## 2021-02-01 RX ADMIN — TAMSULOSIN HYDROCHLORIDE 0.4 MG: 0.4 CAPSULE ORAL at 08:35

## 2021-02-01 RX ADMIN — ROPIVACAINE HYDROCHLORIDE 8 ML/HR: 2 INJECTION, SOLUTION EPIDURAL; INFILTRATION at 03:59

## 2021-02-01 RX ADMIN — CYCLOBENZAPRINE HYDROCHLORIDE 5 MG: 10 TABLET, FILM COATED ORAL at 01:54

## 2021-02-01 RX ADMIN — DOCUSATE SODIUM 100 MG: 100 CAPSULE ORAL at 08:35

## 2021-02-01 RX ADMIN — SODIUM CHLORIDE, PRESERVATIVE FREE 10 ML: 5 INJECTION INTRAVENOUS at 08:37

## 2021-02-01 RX ADMIN — POLYETHYLENE GLYCOL 3350 17 G: 17 POWDER, FOR SOLUTION ORAL at 08:36

## 2021-02-01 NOTE — THERAPY DISCHARGE NOTE
Patient Name: Ubaldo Mcleod  : 1950    MRN: [id number]                              Today's Date: 2021       Admit Date: 2021    Visit Dx:     ICD-10-CM ICD-9-CM   1. Closed right hip fracture, initial encounter (CMS/HCC)  S72.001A 820.8   2. Fall, initial encounter  W19.XXXA E888.9   3. Contusion of right elbow, initial encounter  S50.01XA 923.11     Patient Active Problem List   Diagnosis   • Marroquin's esophagus   • Enlarged prostate   • Hemochromatosis   • Hypertension   • Lacunar infarction (CMS/HCC)   • Peptic ulcer   • Cobalamin deficiency   • Welcome to Medicare preventive visit   • Chronic hepatitis C with cirrhosis (CMS/HCC)   • Benign prostatic hypertrophy   • Hyperbilirubinemia   • Porphyria cutanea tarda (CMS/HCC)   • Memory loss   • Stage 3 chronic kidney disease (CMS/HCC)   • Right hip fracture (CMS/HCC)   • Smoker   • GERD (gastroesophageal reflux disease)   • B12 deficiency   • Peripheral neuropathy     Past Medical History:   Diagnosis Date   • Chronic hepatitis C with cirrhosis (CMS/HCC)    • ED (erectile dysfunction) of non-organic origin    • GERD (gastroesophageal reflux disease)    • Hemochromatosis    • History of allergy    • History of benign prostatic hypertrophy    • History of cirrhosis    • Homocysteinemia (CMS/HCC)    • Porphyria cutanea tarda (CMS/HCC)    • Weight loss      Past Surgical History:   Procedure Laterality Date   • CATARACT EXTRACTION WITH INTRAOCULAR LENS IMPLANT      dec 2016 - has 3 surgries on left eye and 1 on the right eye.    • COLONOSCOPY     • ENDOSCOPY  2016   • EYE SURGERY     • HIP TROCHANTERIC NAILING WITH INTRAMEDULLARY HIP SCREW Right 2021    Procedure: HIP TROCHANTERIC NAILING WITH INTRAMEDULLARY HIP SCREW RIGHT;  Surgeon: Brenden Sharp Jr., MD;  Location: [facility];  Service: Orthopedics;  Laterality: Right;   • REFRACTIVE SURGERY  2017    follow up surgery on both eyes      General Information     Row Name 21  0901          Physical Therapy Time and Intention    Document Type  discharge treatment  -AA     Mode of Treatment  physical therapy  -AA     Row Name 02/01/21 0901          General Information    Patient Profile Reviewed  yes  -AA     Existing Precautions/Restrictions  right;weight bearing WBAT  -AA     Row Name 02/01/21 0901          Cognition    Orientation Status (Cognition)  oriented x 4  -AA     Row Name 02/01/21 0901          Safety Issues, Functional Mobility    Impairments Affecting Function (Mobility)  balance;endurance/activity tolerance;sensation/sensory awareness;pain;strength  -AA       User Key  (r) = Recorded By, (t) = Taken By, (c) = Cosigned By    Initials Name Provider Type    AA Wanda Hope, PT Physical Therapist        Mobility     Row Name 02/01/21 0901          Bed Mobility    Bed Mobility  scooting/bridging;supine-sit  -AA     Scooting/Bridging [place] (Bed Mobility)  minimum assist (75% patient effort);2 person assist;verbal cues  -AA     Supine-Sit [place] (Bed Mobility)  moderate assist (50% patient effort);2 person assist;verbal cues  -AA     Assistive Device (Bed Mobility)  bed rails;head of bed elevated  -AA     Comment (Bed Mobility)  VC for hand placement; min A to advance RLE off EOB  -AA     Row Name 02/01/21 0901          Transfers    Comment (Transfers)  VC for hand placement, posture, and stepping out RLE for comfort  -AA     Row Name 02/01/21 0901          Sit-Stand Transfer    Sit-Stand [place] (Transfers)  minimum assist (75% patient effort);2 person assist;verbal cues  -AA     Assistive Device (Sit-Stand Transfers)  walker, front-wheeled  -AA     Row Name 02/01/21 0901          Gait/Stairs (Locomotion)    [place] Level (Gait)  minimum assist (75% patient effort);1 person assist;1 person to manage equipment;verbal cues  -AA     Assistive Device (Gait)  walker, front-wheeled  -AA     Distance in Feet (Gait)  25'  -     Deviations/Abnormal Patterns  (Gait)  bilateral deviations;esme decreased;steppage;right sided deviations;stride length decreased;antalgic  -AA     Bilateral Gait Deviations  forward flexed posture  -AA     [place] Level (Stairs)  not tested  -AA     Comment (Gait/Stairs)  Pt ambulated with RW and Min A of 2 initially and progressed to min A of 1 with close chair follow.  VC for step sequencing.  VC for increasing WB on RLE.  VC for posture  -AA     Row Name 02/01/21 0901          Mobility    Extremity Weight-bearing Status  right lower extremity  -AA     Right Lower Extremity (Weight-bearing Status)  weight-bearing as tolerated (WBAT)  -       User Key  (r) = Recorded By, (t) = Taken By, (c) = Cosigned By    Initials Name Provider Type    AA Wanda Hope PT Physical Therapist        Obj/Interventions     Row Name 02/01/21 0901          Motor Skills    Therapeutic Exercise  hip;knee;ankle  -     Row Name 02/01/21 0901          Hip (Therapeutic Exercise)    Hip (Therapeutic Exercise)  strengthening exercise;isometric exercises  -     Hip Isometrics (Therapeutic Exercise)  bilateral;gluteal sets;10 repetitions  -     Hip Strengthening (Therapeutic Exercise)  right;aBduction;aDduction;marching while seated;10 repetitions  -     Row Name 02/01/21 0901          Knee (Therapeutic Exercise)    Knee (Therapeutic Exercise)  strengthening exercise;isometric exercises  -     Knee Isometrics (Therapeutic Exercise)  bilateral;quad sets;10 repetitions  -     Knee Strengthening (Therapeutic Exercise)  right;SLR (straight leg raise);LAQ (long arc quad);heel slides;10 repetitions  -     Row Name 02/01/21 0901          Ankle (Therapeutic Exercise)    Ankle (Therapeutic Exercise)  AROM (active range of motion)  -     Ankle AROM (Therapeutic Exercise)  bilateral;dorsiflexion;plantarflexion;10 repetitions  -     Row Name 02/01/21 0901          Balance    Balance Assessment  sitting static balance;standing static balance;standing  dynamic balance  -AA     Static Sitting Balance  WNL;unsupported;sitting, edge of bed  -AA     Static Standing Balance  mild impairment;supported;standing  -AA     Dynamic Standing Balance  mild impairment;supported;standing  -AA       User Key  (r) = Recorded By, (t) = Taken By, (c) = Cosigned By    Initials Name Provider Type    Wanda Hickman, PT Physical Therapist        Goals/Plan     Row Name 02/01/21 0901          Bed Mobility Goal 1 (PT)    Activity/Assistive Device (Bed Mobility Goal 1, PT)  bed mobility activities, all  -AA     [place] Level/Cues Needed (Bed Mobility Goal 1, PT)  minimum assist (75% or more patient effort);2 person assist;verbal cues required  -AA     Time Frame (Bed Mobility Goal 1, PT)  10 days  -AA     Progress/Outcomes (Bed Mobility Goal 1, PT)  goal partially met;discharged from facility  -AA     Row Name 02/01/21 0901          Transfer Goal 1 (PT)    Activity/Assistive Device (Transfer Goal 1, PT)  sit-to-stand/stand-to-sit;bed-to-chair/chair-to-bed  -AA     [place] Level/Cues Needed (Transfer Goal 1, PT)  minimum assist (75% or more patient effort);2 person assist;verbal cues required  -AA     Time Frame (Transfer Goal 1, PT)  10 days  -AA     Progress/Outcome (Transfer Goal 1, PT)  goal met  -AA     Row Name 02/01/21 0901          Gait Training Goal 1 (PT)    Activity/Assistive Device (Gait Training Goal 1, PT)  gait (walking locomotion);assistive device use  -AA     [place] Level (Gait Training Goal 1, PT)  minimum assist (75% or more patient effort);2 person assist  -AA     Distance (Gait Training Goal 1, PT)  50'  -AA     Time Frame (Gait Training Goal 1, PT)  10 days  -AA     Progress/Outcome (Gait Training Goal 1, PT)  goal partially met;discharged from facility  -AA       User Key  (r) = Recorded By, (t) = Taken By, (c) = Cosigned By    Initials Name Provider Type    Wanda Hickman, PT Physical Therapist        Clinical Impression     Row Name 02/01/21  0901          Pain    Additional Documentation  Pain Scale: Numbers Pre/Post-Treatment (Group)  -AA     Row Name 02/01/21 0901          Pain Scale: Numbers Pre/Post-Treatment    Pretreatment Pain Rating  0/10 - no pain  -AA     Posttreatment Pain Rating  0/10 - no pain  -AA     Pain Location - Side  Right  -AA     Pain Location - Orientation  incisional  -AA     Pain Location  hip  -AA     Pre/Posttreatment Pain Comment  6-7/10 during standing  -AA     Pain Intervention(s)  Repositioned;Ambulation/increased activity;Cold applied  -AA     Row Name 02/01/21 0901          Plan of Care Review    Plan of Care Reviewed With  patient  -AA     Progress  improving  -AA     Outcome Summary  Pt perform supine to sit with mod A of 2.  STS min A of 2 with RW.  Pt ambulated 25' with RW and min A of 2 initially and progressed to min A of 1 with close chair follow.  VC for step sequencing and increasing WB RLE.  Pt motivated to return to PLOF and participates well.  Pain 0/10 at rest and 6-7/10 with standing.  Plan to DC  to IRF today  -AA     Row Name 02/01/21 0901          Positioning and Restraints    Pre-Treatment Position  in bed  -AA     Post Treatment Position  chair  -AA     In Chair  notified nsg;reclined;call light within reach;encouraged to call for assist;exit alarm on;waffle cushion;legs elevated  -AA       User Key  (r) = Recorded By, (t) = Taken By, (c) = Cosigned By    Initials Name Provider Type    AA Wanda Hope, PT Physical Therapist        Outcome Measures     Row Name 02/01/21 0901          How much help from another person do you currently need...    Turning from your back to your side while in flat bed without using bedrails?  3  -AA     Moving from lying on back to sitting on the side of a flat bed without bedrails?  2  -AA     Moving to and from a bed to a chair (including a wheelchair)?  3  -AA     Standing up from a chair using your arms (e.g., wheelchair, bedside chair)?  3  -AA     Climbing 3-5 steps  with a railing?  2  -AA     To walk in hospital room?  3  -AA     AM-PAC 6 Clicks Score (PT)  16  -AA     Row Name 02/01/21 0901          Functional Assessment    Outcome Measure Options  AM-PAC 6 Clicks Basic Mobility (PT)  -       User Key  (r) = Recorded By, (t) = Taken By, (c) = Cosigned By    Initials Name Provider Type    AA Wanda Hope, PT Physical Therapist        Physical Therapy Education                 Title: PT OT SLP Therapies (Done)     Topic: Physical Therapy (Done)     Point: Mobility training (Done)     Learning Progress Summary           Patient Acceptance, E,D, VU,DU,NR by AA at 2/1/2021 0901    Acceptance, E,D, VU,DU,NR by CT at 1/31/2021 0925    Acceptance, E,D, NR by CT at 1/30/2021 1533    Acceptance, E,D, VU,DU,NR by AA at 1/29/2021 0832                   Point: Home exercise program (Done)     Learning Progress Summary           Patient Acceptance, E,D, VU,DU,NR by AA at 2/1/2021 0901    Acceptance, E,D, VU,DU,NR by CT at 1/31/2021 0925    Acceptance, E,D, NR by CT at 1/30/2021 1533    Acceptance, E,D, VU,DU,NR by AA at 1/29/2021 0832                   Point: Body mechanics (Done)     Learning Progress Summary           Patient Acceptance, E,D, VU,DU,NR by AA at 2/1/2021 0901    Acceptance, E,D, VU,DU,NR by CT at 1/31/2021 0925    Acceptance, E,D, NR by CT at 1/30/2021 1533    Acceptance, E,D, VU,DU,NR by AA at 1/29/2021 0832                   Point: Precautions (Done)     Learning Progress Summary           Patient Acceptance, E,D, VU,DU,NR by AA at 2/1/2021 0901    Acceptance, E,D, VU,DU,NR by CT at 1/31/2021 0925    Acceptance, E,D, NR by CT at 1/30/2021 1533    Acceptance, E,D, VU,DU,NR by AA at 1/29/2021 0832                               User Key     Initials Effective Dates Name Provider Type Discipline    CT 06/19/15 -  Renny Desai, PT Physical Therapist PT    AA 04/02/18 -  Wanda Hope, PT Physical Therapist PT              PT Recommendation and Plan  Planned  Therapy Interventions (PT): balance training, bed mobility training, gait training, stair training, strengthening, transfer training, patient/family education  Plan of Care Reviewed With: patient  Progress: improving  Outcome Summary: Pt perform supine to sit with mod A of 2.  STS min A of 2 with RW.  Pt ambulated 25' with RW and min A of 2 initially and progressed to min A of 1 with close chair follow.  VC for step sequencing and increasing WB RLE.  Pt motivated to return to PLOF and participates well.  Pain 0/10 at rest and 6-7/10 with standing.  Plan to DC  to IRF today     Time Calculation:   PT Charges     Row Name 02/01/21 0901             Time Calculation    Start Time  0901  -AA      PT Received On  02/01/21  -AA      PT Goal Re-Cert Due Date  02/08/21  -AA         Time Calculation- PT    Total Timed Code Minutes- PT  28 minute(s)  -AA         Timed Charges    53641 - PT Therapeutic Exercise Minutes  13  -AA      73678 - Gait Training Minutes   15  -AA        User Key  (r) = Recorded By, (t) = Taken By, (c) = Cosigned By    Initials Name Provider Type    Wanda Hickman PT Physical Therapist        Therapy Charges for Today     Code Description Service Date Service Provider Modifiers Qty    60231041836 HC PT THER PROC EA 15 MIN 2/1/2021 Wanda Hope, PT GP 1    19769340989 HC GAIT TRAINING EA 15 MIN 2/1/2021 Wanda Hope, PT GP 1    58631723055 HC PT THER SUPP EA 15 MIN 2/1/2021 Wanda Hope, PT GP 2          PT G-Codes  Outcome Measure Options: AM-PAC 6 Clicks Basic Mobility (PT)  AM-PAC 6 Clicks Score (PT): 16  AM-PAC 6 Clicks Score (OT): 15    PT Discharge Summary  Anticipated Discharge Disposition (PT): inpatient rehabilitation facility  Reason for Discharge: Discharge from facility  Outcomes Achieved: Patient able to partially acheive established goals  Discharge Destination: Inpatient rehabilitation facility    Wanda Hope PT  2/1/2021

## 2021-02-01 NOTE — PROGRESS NOTES
Case Management Discharge Note      Final Note: Plan is [facility]. [facility] is scheduled for 1300. [facility] is on chart. Nurse to call report to [phone] and fax discharge summary to [phone].         Selected Continued Care - Admitted Since 1/28/2021     Destination Coordination complete    Service Provider Selected Services Address Phone Fax Patient Preferred    [facility]  Inpatient Rehabilitation [address] [id number] [phone] --          Durable Medical Equipment    No services have been selected for the patient.              Dialysis/Infusion    No services have been selected for the patient.              Home Medical Care    No services have been selected for the patient.              Therapy    No services have been selected for the patient.              Community Resources    No services have been selected for the patient.                  Transportation Services  Ambulance: [facility]    Final Discharge Disposition Code: 62 - inpatient rehab facility

## 2021-02-01 NOTE — PLAN OF CARE
Goal Outcome Evaluation:  Plan of Care Reviewed With: patient  Progress: improving  Outcome Summary: Pt perform supine to sit with mod A of 2.  STS min A of 2 with RW.  Pt ambulated 25' with RW and min A of 2 initially and progressed to min A of 1 with close chair follow.  VC for step sequencing and increasing WB RLE.  Pt motivated to return to PLOF and participates well.  Pain 0/10 at rest and 6-7/10 with standing.  Plan to DC  to IRF today

## 2021-02-01 NOTE — PROGRESS NOTES
[facility]    Acute pain service Inpatient Progress Note    Patient Name: Ubaldo Mcleod  :  1950  MRN:  [id number]        Acute Pain  Service Inpatient Progress Note:    Analgesia:Excellent  Pain Score:0/10  LOC: alert and awake  Resp Status: room air  Cardiac: VS stable  Side Effects:None  Catheter Site:clean, dressing intact and dry  Cath type: peripheral nerve cath(MOOG pump)  Catheter Plan:RN to remove catheter  Comments: Infusion off. RN will dc after pt eats breakfast ---------------------------------------------------------------------------------  Physical Therapy Manual Muscle Testing Results:   ]    Physical Therapy - Plan of Care Review - Outcome Summary:  Outcome Summary: gait 20 ft to door with RW.  LE ex and able to tx with supervision to CG . OOB in chair (21)]    Occupational Therapy - Plan of Care Review - Outcome Summary:  Outcome Summary: OT IE completed. Pt is alert, Ox4 and motivated to work with therapy. ModAx2 bed mobility. MaxAx2 STS. ModAx2 std pivot EOB to chair. Education/demonstration initiated for use of AE to increase ADL independence. Follow up training recommended. Recommend TTB for home safety. OT will follow IP. Recommend rehab at d/c for best outcome. (21)]  ----------------------------------------------------------------------------------

## 2021-02-01 NOTE — PLAN OF CARE
Goal Outcome Evaluation:         Pt alert and oriented. VSS. No c/o pain. Scant amount of drainage on dressings. Ropivacaine infusing at 8ml/hr. Walked with therapy today. Ambulance scheduled for 1300 tomorrow to [facility].

## 2021-02-01 NOTE — DISCHARGE SUMMARY
[facility] Medicine Services  TRANSFER SUMMARY    Patient Name: Ubaldo Mcleod  : 1950  MRN: [id number]    Date of Admission: 2021  Date of Discharge: 21  Length of Stay: 4  Primary Care Physician: Glenn Costa MD    Consults     Date and Time Order Name Status Description    2021 1058 Inpatient Orthopedic Surgery Consult Completed           Hospital Course     Presenting Problem:   Hip fracture (CMS/HCC) [S72.009A]  Hip fracture (CMS/HCC) [S72.009A]    Active Hospital Problems    Diagnosis  POA   • **Right hip fracture (CMS/HCC) [S72.009A]  Yes   • B12 deficiency [E53.8]  Yes   • Peripheral neuropathy [G62.9]  Yes   • GERD (gastroesophageal reflux disease) [K21.9]  Yes   • Smoker [F17.200]  Yes   • Chronic hepatitis C with cirrhosis (CMS/HCC) [B18.2, K74.60]  Yes   • Benign prostatic hypertrophy [N40.0]  Yes   • Hyperbilirubinemia [E80.6]  Yes   • Hypertension [I10]  Yes      Resolved Hospital Problems   No resolved problems to display.          Hospital Course:  Ubaldo Mcleod is a 70 y.o. male  with hx of HTN, BPH, GERD, chronic hepatitis C with cirrhosis, and tobacco abuse who presents after a mechanical fall at home. Imaging showed an acute mildly comminuted fracture through the intertrochanteric region of the right femur. Orthopedic Surgery was consulted. Dr. Sharp performed nailing with intramedullary screw on 21.     Intertrochanteric right femur fracture  --s/p nailing and intramedullary screw on 21.  --Post-op care per Dr. Sharp. DVT prophylaxis as below.   --Pain control, PT/OT. Plans for rehab at [facility]     Leukocytosis  --Likely reactive secondary to surgery.  --Resolved.     Abnormal CXR  Probable COPD  --CXR showing opacifications in the mid and lower lungs of peripheral predominance consistent with acute airspace disease. However patient is afebrile with normal WBC on admisison and his chronic cough is at baseline  (probably due to smoking). He is stable on room air. COVID-19 and Flu PCR are negative. Will continue to monitor.   --PRN duonebs.     GERD  --Continue PPI BID.     BPH  --Continue Flomax.     Chronic hepatitis C with cirrhosis  Hyperbilirubinemia  --Bilirubin chronically elevated. Stable. Continue to monitor.     B12 deficiency  Peripheral neuropathy  --Was due for monthly B12 injection, received here on 1/30/21.     Tobacco abuse  --Nicotine patch.  --previoiusly counseled on cessation.           Discharge Follow Up Recommendations for outpatient labs/diagnostics:  PCP 1 week  Ortho 2 weeks  Post surgical DVT prophylaxis:  Lovenox 40mg SQ daily x2 weeks, followed by ASA 325mg daily x4 weeks    Day of Discharge     HPI:   No issues overnight  States his pain is controlled when he isn't moving  Ready for [facility] today    Review of Systems  Gen- No fevers, chills  CV- No chest pain, palpitations  Resp- No cough, dyspnea  GI- No N/V/D, abd pain     Vital Signs:   Temp:  [97.8 °F (36.6 °C)-98.4 °F (36.9 °C)] 97.9 °F (36.6 °C)  Heart Rate:  [] 93  Resp:  [16-18] 16  BP: (120-138)/(80-88) 138/88     Physical Exam:  Constitutional: No acute distress, awake, alert, wife at bedside  HENT: NCAT, mucous membranes moist  Respiratory: Clear to auscultation bilaterally, respiratory effort normal   Cardiovascular: RRR, no murmurs, rubs, or gallops  Gastrointestinal: Positive bowel sounds, soft, nontender, nondistended  Musculoskeletal: No bilateral ankle edema  Psychiatric: Appropriate affect, cooperative  Neurologic: Oriented x 3, BLUNT, speech clear  Skin: No rashes noted.  Dressing c/d/i, ice pack in place      Pertinent Results     Results from last 7 days   Lab Units 01/31/21  0914 01/30/21  0942 01/29/21  0549 01/28/21  0838   WBC 10*3/mm3 9.75 10.06 13.16* 8.60   HEMOGLOBIN g/dL 12.5* 12.1* 12.4* 13.9   HEMATOCRIT % 38.9 37.6 39.5 43.7   PLATELETS 10*3/mm3 262 236 244 279   SODIUM mmol/L 135* 135* 134* 138   POTASSIUM  mmol/L 4.1 4.0 4.3 4.2   CHLORIDE mmol/L 100 102 101 103   CO2 mmol/L 25.0 26.0 23.0 22.0   BUN mg/dL 7* 10 9 9   CREATININE mg/dL 0.72* 0.73* 0.69* 0.80   GLUCOSE mg/dL 107* 113* 131* 131*   CALCIUM mg/dL 8.8 8.3* 8.0* 8.9     Results from last 7 days   Lab Units 01/31/21  0914 01/30/21  0942 01/28/21  0838   BILIRUBIN mg/dL 4.3* 3.3* 3.2*   ALK PHOS U/L 308* 278* 346*   ALT (SGPT) U/L 25 16 22   AST (SGOT) U/L 61* 46* 47*   PROTIME Seconds  --   --  14.0   INR   --   --  1.10       Brief Urine Lab Results     None          Microbiology Results Abnormal     Procedure Component Value - Date/Time    COVID PRE-OP / PRE-PROCEDURE SCREENING ORDER (NO ISOLATION) - Swab, Nasopharynx [[id number]]  (Normal) Collected: 01/28/21 0913    Lab Status: Final result Specimen: Swab from Nasopharynx Updated: 01/28/21 1004    Narrative:      The following orders were created for panel order COVID PRE-OP / PRE-PROCEDURE SCREENING ORDER (NO ISOLATION) - Swab, Nasopharynx.  Procedure                               Abnormality         Status                     ---------                               -----------         ------                     COVID-19 and FLU A/B PCR...[[id number]]  Normal              Final result                 Please view results for these tests on the individual orders.    COVID-19 and FLU A/B PCR - Swab, Nasopharynx [[id number]]  (Normal) Collected: 01/28/21 0913    Lab Status: Final result Specimen: Swab from Nasopharynx Updated: 01/28/21 1004     COVID19 Not Detected     Influenza A PCR Not Detected     Influenza B PCR Not Detected    Narrative:      Fact sheet for providers: https://www.fda.gov/media/909943/download    Fact sheet for patients: https://www.fda.gov/media/990506/download    Test performed by PCR.          Imaging Results (All)     Procedure Component Value Units Date/Time    FL C Arm During Surgery [[id number]] Collected: 01/28/21 1547     Updated: 01/28/21 1658    Narrative:      EXAMINATION: FL  C-ARM DURING SURGERY-01/28/2021:     INDICATION: Troch Nail.      TECHNIQUE: Intraoperative fluoroscopy for improved localization and  treatment planning.     COMPARISON: NONE.     FINDINGS: Intraoperative fluoroscopy with total fluoroscopic time usage  1 minute and 8 seconds, and 4 images saved during left trochanteric  nailing.       Impression:      Intraoperative fluoroscopy utilized during trochanteric  nailing.     D:  01/28/2021  E:  01/28/2021            This report was finalized on 1/28/2021 4:54 PM by Dr. Marvel Malik.       XR Hip With or Without Pelvis 2 - 3 View Right [[id number]] Collected: 01/28/21 0940     Updated: 01/28/21 1657    Narrative:      EXAMINATION: XR HIP W OR WO PELVIS 2-3 VIEW RIGHT-      INDICATION: Right hip trauma, fall.      COMPARISON: None.     FINDINGS: Single AP view of the pelvis along with AP and crosstable  lateral views of the right hip reveal SI joints symmetric and without  widening. No displaced pelvic ring fracture. Femoral heads are well  located bilateral. Acute mildly comminuted fracture through the  intertrochanteric region right femur without significant angulation or  displacement.           Impression:      Acute mildly comminuted fracture through the  intertrochanteric region right femur without significant angulation or  displacement. Minimal fragmentation along the lesser trochanteric region  with potential mild subtrochanteric extension along the medial margin.     D:  01/28/2021  E:  01/28/2021     This report was finalized on 1/28/2021 4:54 PM by Dr. Marvel Malik.       XR Elbow 2 View Right [[id number]] Collected: 01/28/21 0956     Updated: 01/28/21 1657    Narrative:      EXAMINATION: XR ELBOW 2 VW RIGHT-      INDICATION: Trauma.      COMPARISON: None.     FINDINGS: AP and lateral views of the right elbow without acute fracture  or osseous malalignment. Cortical margins and joint spaces are well  preserved. No elbow joint effusion.           Impression:       No acute osseous abnormality or radiopaque foreign body  involving the right elbow.     D:  01/28/2021  E:  01/28/2021     This report was finalized on 1/28/2021 4:54 PM by Dr. Marvel Malik.       XR Chest 1 View [[id number]] Collected: 01/28/21 0938     Updated: 01/28/21 1657    Narrative:      EXAMINATION: XR CHEST 1 VW-01/28/2021:      INDICATION: Preop.      COMPARISON: NONE.     FINDINGS: Single AP view of the chest reveals cardiac size within normal  limits. Opacifications in the mid and lower lungs appearing peripheral  predominant consistent with acute airspace disease such as  bronchopneumonia. No pneumothorax or pleural effusion. Degenerative  changes of the spine.       Impression:      Opacifications in the mid and lower lungs of a peripheral  predominance consistent with acute airspace disease such as  bronchopneumonia.     D:  01/28/2021  E:  01/28/2021     This report was finalized on 1/28/2021 4:54 PM by Dr. Marvel Malik.               Discharge Details        Discharge Medications      New Medications      Instructions Start Date   acetaminophen 325 MG tablet  Commonly known as: TYLENOL   650 mg, Oral, Every 4 Hours PRN      aspirin  MG tablet   325 mg, Oral, Daily, Begin after 2 weeks of lovenox complete   Start Date: February 15, 2021     docusate sodium 100 MG capsule   100 mg, Oral, 2 Times Daily      enoxaparin 40 MG/0.4ML solution syringe  Commonly known as: Lovenox   40 mg, Subcutaneous, Every 24 Hours Scheduled      HYDROcodone-acetaminophen 7.5-325 MG per tablet  Commonly known as: NORCO   1 tablet, Oral, Every 6 Hours PRN      nicotine 21 MG/24HR patch  Commonly known as: NICODERM CQ   1 patch, Transdermal, Every 24 Hours Scheduled   Start Date: February 2, 2021     polyethylene glycol 17 g packet  Commonly known as: MIRALAX   17 g, Oral, Daily PRN         Changes to Medications      Instructions Start Date   cyanocobalamin 1000 MCG/ML injection  What changed: See the new  "instructions.   INJECT 1 MILLILITER( 1000 MCG) INTRAMUSCULARLY MONTHLY      cyclobenzaprine 10 MG tablet  Commonly known as: FLEXERIL  What changed: See the new instructions.   5 mg, Oral, 3 Times Daily PRN      pantoprazole 40 MG EC tablet  Commonly known as: PROTONIX  What changed: Another medication with the same name was removed. Continue taking this medication, and follow the directions you see here.   40 mg, Oral, 2 Times Daily         Continue These Medications      Instructions Start Date   folic acid 1 MG tablet  Commonly known as: FOLVITE   TAKE 1 TABLET BY MOUTH EVERY DAY      Syringe/Needle (Disp) 25G X 1\" 3 ML misc  Commonly known as: B-D 3CC LUER-KISHA SYR 25GX1\"   USE AS DIRECTED EVERY MONTH      tamsulosin 0.4 MG capsule 24 hr capsule  Commonly known as: FLOMAX   TAKE 1 CAPSULE BY MOUTH AT BEDTIME         Stop These Medications    amLODIPine 5 MG tablet  Commonly known as: NORVASC            No Known Allergies      Discharge Disposition:  Rehab Facility or Unit (DC - External)    Discharge Diet:  Diet Order   Procedures   • Diet Regular       Discharge Activity:  Activity Instructions     Activity as Tolerated              CODE STATUS:    Code Status and Medical Interventions:   Ordered at: 01/28/21 1058     Level Of Support Discussed With:    Patient     Code Status:    CPR     Medical Interventions (Level of Support Prior to Arrest):    Full         No future appointments.    Additional Instructions for the Follow-ups that You Need to Schedule     Discharge Follow-up with PCP   As directed       Currently Documented PCP:    Glenn Costa MD    PCP Phone Number:    [phone]     Follow Up Details: after dc from rehab         Discharge Follow-up with Specified Provider: Dr. Sharp; 2 Weeks   As directed      To: Dr. Sharp    Follow Up: 2 Weeks                 Time Spent on Discharge: I spent 35 minutes on this discharge activity which included: face-to-face encounter with the patient, " reviewing the data in the system, coordination of the care with the nursing staff as well as consultants, documentation, and entering orders.

## 2021-02-01 NOTE — PLAN OF CARE
"PT has not had BM since 1/27th. BS+ to all four qrts, PT denies N/V/abd discomfort. Abd grossly distended/rounded. Aquacel dressings CDI. PT cont to deny pain, states, \"Im sore because of therapy but  have no pain.\" VSS. SR on cardiac mx. Will cont to mx. Call light in reach.  "

## 2021-02-24 ENCOUNTER — OFFICE VISIT (OUTPATIENT)
Dept: FAMILY MEDICINE CLINIC | Facility: CLINIC | Age: 71
End: 2021-02-24

## 2021-02-24 VITALS
WEIGHT: 175 LBS | TEMPERATURE: 97.8 F | DIASTOLIC BLOOD PRESSURE: 66 MMHG | OXYGEN SATURATION: 99 % | HEIGHT: 70 IN | BODY MASS INDEX: 25.05 KG/M2 | HEART RATE: 87 BPM | SYSTOLIC BLOOD PRESSURE: 102 MMHG

## 2021-02-24 DIAGNOSIS — G47.01 INSOMNIA DUE TO MEDICAL CONDITION: ICD-10-CM

## 2021-02-24 DIAGNOSIS — S72.351D CLOSED DISPLACED COMMINUTED FRACTURE OF SHAFT OF RIGHT FEMUR WITH ROUTINE HEALING, SUBSEQUENT ENCOUNTER: Primary | ICD-10-CM

## 2021-02-24 DIAGNOSIS — R91.8 BILATERAL PULMONARY INFILTRATES ON CHEST X-RAY: ICD-10-CM

## 2021-02-24 PROCEDURE — 99214 OFFICE O/P EST MOD 30 MIN: CPT | Performed by: FAMILY MEDICINE

## 2021-02-24 RX ORDER — TRAZODONE HYDROCHLORIDE 50 MG/1
TABLET ORAL
Qty: 60 TABLET | Refills: 2 | Status: SHIPPED | OUTPATIENT
Start: 2021-02-24 | End: 2021-03-24

## 2021-02-24 RX ORDER — LANOLIN ALCOHOL/MO/W.PET/CERES
3 CREAM (GRAM) TOPICAL
COMMUNITY
Start: 2021-02-11 | End: 2021-02-25

## 2021-02-24 NOTE — PROGRESS NOTES
"Subjective   Ubaldo Mcleod is a 70 y.o. male seen today for Hypertension and Hip Pain (follow up on hip fracture).     History of Present Illness   The patient is here for a follow up on right femur fracture.    States he is doing good.  States he tripped over some towels on the floor and hit the floor.. Went to the ER  And Xray reveal a fracture of the right femur. Had surgery on 01/28/21 with Dr Sharp.     States he is not able to sleep due to the pain. States it is getting better. Just enough to aggravate and keep him awake.  He is taking tylenol 325 mg 2 every 4 hours as needed for pain.  He is not taking the Hydrocodone due to side effects..      States he does have some constipation.  Taking Docusate 100 mg twice a day.      The following portions of the patient's history were reviewed and updated as appropriate: allergies, current medications, past social history and problem list.    Review of Systems   Constitutional: Negative for chills and fever.   Respiratory: Negative for shortness of breath.    Cardiovascular: Negative for chest pain.   Musculoskeletal: Positive for arthralgias (right hip) and myalgias (right leg).       Objective   /66   Pulse 87   Temp 97.8 °F (36.6 °C)   Ht 177.8 cm (70\")   Wt 79.4 kg (175 lb) Comment: per patient  SpO2 99%   BMI 25.11 kg/m²   Physical Exam  Vitals signs and nursing note reviewed.   Constitutional:       General: He is not in acute distress.     Appearance: Normal appearance.   Neurological:      Mental Status: He is alert.         Assessment/Plan   Problems Addressed this Visit     None      Visit Diagnoses     Closed displaced comminuted fracture of shaft of right femur with routine healing, subsequent encounter    -  Primary    Insomnia due to medical condition          Diagnoses       Codes Comments    Closed displaced comminuted fracture of shaft of right femur with routine healing, subsequent encounter    -  Primary ICD-10-CM: " S72.351D  ICD-9-CM: V54.15     Insomnia due to medical condition     ICD-10-CM: G47.01  ICD-9-CM: 327.01         Néstor is recovering from a broken hip.  He is undergone surgery and is doing reasonably well with that.  Working on rehab at home.  He knows that he needs to stay off of alcohol and he also needs to begin to work on quitting smoking.  He has had some constipation.  He can use MiraLAX if needed.  Try to minimize his use of Tylenol in view of his chronic liver disease.      Drink plenty fluids.  Get plenty fluids and fiber in diet.    Take the Docusate twice a day.  Use the Miralax daily if needed.  Decrease  the Tylenol to 2 each night.    Continue other medications as doing.    Rx for Trazodone 50 mg 1 or 2 each night for sleep #60+2.    Follow up in 6 weeks with Dr Ocampo. Sooner if needed.          Scribed for Dr Glenn Costa by Rohini Muller CMA.          I, Glenn Costa MD, personally performed the services described in this documentation, as scribed by Rohini Muller in my presence, and is both accurate and complete.        (Please note that portions of this note were completed with a voice recognition program. Efforts were made to edit the dictations,but occasionally words are mis transcribed.)

## 2021-03-05 DIAGNOSIS — K22.70 BARRETT'S ESOPHAGUS WITHOUT DYSPLASIA: ICD-10-CM

## 2021-03-05 RX ORDER — PANTOPRAZOLE SODIUM 40 MG/1
TABLET, DELAYED RELEASE ORAL
Qty: 180 TABLET | Refills: 1 | Status: SHIPPED | OUTPATIENT
Start: 2021-03-05 | End: 2021-01-01 | Stop reason: SDUPTHER

## 2021-03-17 ENCOUNTER — HOSPITAL ENCOUNTER (OUTPATIENT)
Dept: GENERAL RADIOLOGY | Facility: HOSPITAL | Age: 71
Discharge: HOME | End: 2021-03-17
Admitting: FAMILY MEDICINE

## 2021-03-17 DIAGNOSIS — R91.8 BILATERAL PULMONARY INFILTRATES ON CHEST X-RAY: ICD-10-CM

## 2021-03-17 PROCEDURE — 71046 X-RAY EXAM CHEST 2 VIEWS: CPT

## 2021-03-24 ENCOUNTER — OFFICE VISIT (OUTPATIENT)
Dept: FAMILY MEDICINE CLINIC | Facility: CLINIC | Age: 71
End: 2021-03-24

## 2021-03-24 VITALS
OXYGEN SATURATION: 98 % | SYSTOLIC BLOOD PRESSURE: 130 MMHG | HEART RATE: 80 BPM | BODY MASS INDEX: 25.11 KG/M2 | TEMPERATURE: 97.8 F | HEIGHT: 70 IN | DIASTOLIC BLOOD PRESSURE: 78 MMHG

## 2021-03-24 DIAGNOSIS — Z00.00 MEDICARE ANNUAL WELLNESS VISIT, SUBSEQUENT: Primary | ICD-10-CM

## 2021-03-24 DIAGNOSIS — G62.9 PERIPHERAL POLYNEUROPATHY: ICD-10-CM

## 2021-03-24 DIAGNOSIS — Z12.5 SCREENING FOR PROSTATE CANCER: ICD-10-CM

## 2021-03-24 LAB
ALBUMIN SERPL-MCNC: 3.9 G/DL (ref 3.5–5.2)
ALBUMIN/GLOB SERPL: 1.4 G/DL
ALP SERPL-CCNC: 418 U/L (ref 39–117)
ALT SERPL W P-5'-P-CCNC: 26 U/L (ref 1–41)
ANION GAP SERPL CALCULATED.3IONS-SCNC: 9.7 MMOL/L (ref 5–15)
AST SERPL-CCNC: 43 U/L (ref 1–40)
BASOPHILS # BLD AUTO: 0.08 10*3/MM3 (ref 0–0.2)
BASOPHILS NFR BLD AUTO: 1.1 % (ref 0–1.5)
BILIRUB SERPL-MCNC: 2.6 MG/DL (ref 0–1.2)
BUN SERPL-MCNC: 12 MG/DL (ref 8–23)
BUN/CREAT SERPL: 16.9 (ref 7–25)
CALCIUM SPEC-SCNC: 9.6 MG/DL (ref 8.6–10.5)
CHLORIDE SERPL-SCNC: 103 MMOL/L (ref 98–107)
CHOLEST SERPL-MCNC: 159 MG/DL (ref 0–200)
CO2 SERPL-SCNC: 26.3 MMOL/L (ref 22–29)
CREAT SERPL-MCNC: 0.71 MG/DL (ref 0.76–1.27)
DEPRECATED RDW RBC AUTO: 43.1 FL (ref 37–54)
EOSINOPHIL # BLD AUTO: 0.08 10*3/MM3 (ref 0–0.4)
EOSINOPHIL NFR BLD AUTO: 1.1 % (ref 0.3–6.2)
ERYTHROCYTE [DISTWIDTH] IN BLOOD BY AUTOMATED COUNT: 11.6 % (ref 12.3–15.4)
FOLATE SERPL-MCNC: >20 NG/ML (ref 4.78–24.2)
GFR SERPL CREATININE-BSD FRML MDRD: 110 ML/MIN/1.73
GLOBULIN UR ELPH-MCNC: 2.7 GM/DL
GLUCOSE SERPL-MCNC: 96 MG/DL (ref 65–99)
HCT VFR BLD AUTO: 36.4 % (ref 37.5–51)
HDLC SERPL-MCNC: 76 MG/DL (ref 40–60)
HGB BLD-MCNC: 12.8 G/DL (ref 13–17.7)
IMM GRANULOCYTES # BLD AUTO: 0.04 10*3/MM3 (ref 0–0.05)
IMM GRANULOCYTES NFR BLD AUTO: 0.5 % (ref 0–0.5)
LDLC SERPL CALC-MCNC: 68 MG/DL (ref 0–100)
LDLC/HDLC SERPL: 0.87 {RATIO}
LYMPHOCYTES # BLD AUTO: 1.63 10*3/MM3 (ref 0.7–3.1)
LYMPHOCYTES NFR BLD AUTO: 22.4 % (ref 19.6–45.3)
MCH RBC QN AUTO: 35.7 PG (ref 26.6–33)
MCHC RBC AUTO-ENTMCNC: 35.2 G/DL (ref 31.5–35.7)
MCV RBC AUTO: 101.4 FL (ref 79–97)
MONOCYTES # BLD AUTO: 0.97 10*3/MM3 (ref 0.1–0.9)
MONOCYTES NFR BLD AUTO: 13.3 % (ref 5–12)
NEUTROPHILS NFR BLD AUTO: 4.48 10*3/MM3 (ref 1.7–7)
NEUTROPHILS NFR BLD AUTO: 61.6 % (ref 42.7–76)
NRBC BLD AUTO-RTO: 0 /100 WBC (ref 0–0.2)
PLATELET # BLD AUTO: 381 10*3/MM3 (ref 140–450)
PMV BLD AUTO: 10.5 FL (ref 6–12)
POTASSIUM SERPL-SCNC: 4.5 MMOL/L (ref 3.5–5.2)
PROT SERPL-MCNC: 6.6 G/DL (ref 6–8.5)
PSA SERPL-MCNC: 0.76 NG/ML (ref 0–4)
RBC # BLD AUTO: 3.59 10*6/MM3 (ref 4.14–5.8)
SODIUM SERPL-SCNC: 139 MMOL/L (ref 136–145)
TRIGL SERPL-MCNC: 83 MG/DL (ref 0–150)
VIT B12 BLD-MCNC: 831 PG/ML (ref 211–946)
VLDLC SERPL-MCNC: 15 MG/DL (ref 5–40)
WBC # BLD AUTO: 7.28 10*3/MM3 (ref 3.4–10.8)

## 2021-03-24 PROCEDURE — G0103 PSA SCREENING: HCPCS | Performed by: FAMILY MEDICINE

## 2021-03-24 PROCEDURE — 82607 VITAMIN B-12: CPT | Performed by: FAMILY MEDICINE

## 2021-03-24 PROCEDURE — 80053 COMPREHEN METABOLIC PANEL: CPT | Performed by: FAMILY MEDICINE

## 2021-03-24 PROCEDURE — G0439 PPPS, SUBSEQ VISIT: HCPCS | Performed by: FAMILY MEDICINE

## 2021-03-24 PROCEDURE — 82746 ASSAY OF FOLIC ACID SERUM: CPT | Performed by: FAMILY MEDICINE

## 2021-03-24 PROCEDURE — 85025 COMPLETE CBC W/AUTO DIFF WBC: CPT | Performed by: FAMILY MEDICINE

## 2021-03-24 PROCEDURE — 80061 LIPID PANEL: CPT | Performed by: FAMILY MEDICINE

## 2021-03-24 RX ORDER — DULOXETIN HYDROCHLORIDE 30 MG/1
30 CAPSULE, DELAYED RELEASE ORAL DAILY
Qty: 30 CAPSULE | Refills: 5 | Status: SHIPPED | OUTPATIENT
Start: 2021-03-24 | End: 2021-01-01

## 2021-03-24 NOTE — PROGRESS NOTES
"The ABCs of the Annual Wellness Visit  Subsequent Medicare Wellness Visit    Chief Complaint   Patient presents with   • Establish Care   • Follow-up     chest xray results       Subjective   History of Present Illness:  Ubaldo Mcleod is a 70 y.o. male who presents for a Subsequent Medicare Wellness Visit.    Patient states he's been having bilateral numbness, tingling, and pain in his feet for the past 4-5 years. Patient states it's gradually getting worse slowly over the past couple years. Patient rates the pain 6/10. Patient states when he lays down at night it improves. Patient states he thinks it's a neuropathy. Patient states he has had two different duplex ultrasounds of his legs before. Patient states he has some tingling in his hands as well.     Patient has an in home physical therapist for his recent hip fracture. Patient does the exercises most days. Patient rates hip pain 2/10, but not bothering him sitting at this time.    Patient was informed of chest x-ray results \"No evidence of active chest disease.\" Patient is a current smoker with 75 pack year history (1.5 packs over past 50 years). Patient denies cough or shortness of breath at this time.     Patient states he was at [facility] about 3 nights in January for his hip fracture. Patient was at [facility] for 2 weeks after that. Patient lives at home now.      HEALTH RISK ASSESSMENT    Recent Hospitalizations:  Recently treated at the following:  [facility]    Current Medical Providers:  Patient Care Team:  Neno Ocampo MD as PCP - General (Family Medicine)    Smoking Status:  Social History     Tobacco Use   Smoking Status Current Every Day Smoker   • Packs/day: 1.50   • Years: 0.00   • Pack years: 0.00   • Types: Cigarettes   • Start date: 1966   Smokeless Tobacco Never Used   Tobacco Comment     · 1 - 1 1/2 PACKS PER DAY, FOR THE PAST 50 YEARS       Alcohol Consumption:  Social History     Substance and Sexual Activity " - pt with dementia  - continue home memantine and galantamine      Alcohol Use Yes    Comment: 2 bottles of bourbon a week       Depression Screen:   PHQ-2/PHQ-9 Depression Screening 3/24/2021   Little interest or pleasure in doing things 0   Feeling down, depressed, or hopeless 0   Total Score 0       Fall Risk Screen:  DEE Fall Risk Assessment was completed, and patient is at HIGH risk for falls. Assessment completed on:3/24/2021    Health Habits and Functional and Cognitive Screening:  Functional & Cognitive Status 5/7/2019   Do you have difficulty preparing food and eating? No   Do you have difficulty bathing yourself, getting dressed or grooming yourself? No   Do you have difficulty using the toilet? No   Do you have difficulty moving around from place to place? No   Do you have trouble with steps or getting out of a bed or a chair? Yes   Current Diet Well Balanced Diet   Dental Exam Not up to date   Eye Exam Not up to date   Exercise (times per week) 0 times per week   Current Exercise Activities Include None   Do you need help using the phone?  No   Are you deaf or do you have serious difficulty hearing?  No   Do you need help with transportation? No   Do you need help shopping? No   Do you need help preparing meals?  No   Do you need help with housework?  No   Do you need help with laundry? No   Do you need help taking your medications? No   Do you need help managing money? No   Do you ever drive or ride in a car without wearing a seat belt? Yes         Does the patient have evidence of cognitive impairment? No    Asprin use counseling:Does not need ASA (and currently is not on it)    Age-appropriate Screening Schedule:  Refer to the list below for future screening recommendations based on patient's age, sex and/or medical conditions. Orders for these recommended tests are listed in the plan section. The patient has been provided with a written plan.    Health Maintenance   Topic Date Due   • TDAP/TD VACCINES (1 - Tdap) Never done   • ZOSTER VACCINE (1 of 2) Never done  "  • INFLUENZA VACCINE  03/24/2022 (Originally 8/1/2020)   • COLONOSCOPY  12/27/2026          The following portions of the patient's history were reviewed and updated as appropriate: allergies, current medications, past family history, past medical history, past social history, past surgical history and problem list.    Outpatient Medications Prior to Visit   Medication Sig Dispense Refill   • acetaminophen (TYLENOL) 325 MG tablet Take 2 tablets by mouth Every 4 (Four) Hours As Needed for Mild Pain .     • cyanocobalamin 1000 MCG/ML injection INJECT 1 MILLILITER( 1000 MCG) INTRAMUSCULARLY MONTHLY (Patient taking differently: TOOK ABOUT 3 WEEKS AGO) 10 mL 2   • cyclobenzaprine (FLEXERIL) 10 MG tablet Take 0.5 tablets by mouth 3 (Three) Times a Day As Needed for Muscle Spasms.     • folic acid (FOLVITE) 1 MG tablet TAKE 1 TABLET BY MOUTH EVERY DAY 90 tablet 3   • pantoprazole (PROTONIX) 40 MG EC tablet TAKE 1 TABLET BY MOUTH TWICE DAILY 180 tablet 1   • polyethylene glycol (MIRALAX) 17 g packet Take 17 g by mouth Daily As Needed (constipation).     • Syringe/Needle, Disp, (B-D 3CC LUER-KISHA SYR 25GX1\") 25G X 1\" 3 ML misc USE AS DIRECTED EVERY MONTH 10 each 1   • tamsulosin (FLOMAX) 0.4 MG capsule 24 hr capsule TAKE 1 CAPSULE BY MOUTH AT BEDTIME 90 capsule 0   • docusate sodium 100 MG capsule Take 1 capsule by mouth 2 (Two) Times a Day.     • HYDROcodone-acetaminophen (NORCO) 7.5-325 MG per tablet Take 1 tablet by mouth Every 6 (Six) Hours As Needed for Moderate Pain .     • nicotine (NICODERM CQ) 21 MG/24HR patch Place 1 patch on the skin as directed by provider Daily.     • traZODone (DESYREL) 50 MG tablet Take one or two each night for sleep. 60 tablet 2     No facility-administered medications prior to visit.       Patient Active Problem List   Diagnosis   • Marroquin's esophagus   • Hemochromatosis   • Hypertension   • Lacunar infarction (CMS/HCC)   • Peptic ulcer   • Cobalamin deficiency   • Chronic hepatitis C with " "cirrhosis (CMS/HCC)   • Benign prostatic hypertrophy   • Hyperbilirubinemia   • Porphyria cutanea tarda (CMS/HCC)   • Memory loss   • Stage 3 chronic kidney disease (CMS/HCC)   • Right hip fracture (CMS/HCC)   • Smoker   • GERD (gastroesophageal reflux disease)   • B12 deficiency   • Peripheral neuropathy   • Medicare annual wellness visit, subsequent       Advanced Care Planning:  ACP discussion was held with the patient during this visit. Patient has an advance directive (not in EMR), copy requested.    Review of Systems   Constitutional: Negative for chills, fatigue and fever.   Respiratory: Negative for cough and shortness of breath.    Cardiovascular: Negative for chest pain and palpitations.   Neurological: Positive for numbness (bilateral feet). Negative for dizziness, weakness, light-headedness and headaches.       Compared to one year ago, the patient feels his physical health is the same.  Compared to one year ago, the patient feels his mental health is the same.    Reviewed chart for potential of high risk medication in the elderly: yes  Reviewed chart for potential of harmful drug interactions in the elderly:yes    Objective         Vitals:    03/24/21 0952   BP: 130/78   BP Location: Right arm   Patient Position: Sitting   Cuff Size: Adult   Pulse: 80   Temp: 97.8 °F (36.6 °C)   TempSrc: Temporal   SpO2: 98%   Weight: Comment: Unable to stand on scales   Height: 177.8 cm (70\")   PainSc:   2   PainLoc: Hip  Comment: right       Body mass index is 25.11 kg/m².  Discussed the patient's BMI with him. The BMI is in the acceptable range.    Physical Exam  Constitutional:       General: He is not in acute distress.     Appearance: He is well-developed. He is not ill-appearing.   HENT:      Head: Normocephalic and atraumatic.      Right Ear: Hearing normal.      Left Ear: Hearing normal.      Nose: Nose normal.   Eyes:      General: No scleral icterus.        Right eye: No discharge.         Left eye: No " discharge.      Pupils: Pupils are equal, round, and reactive to light.   Neck:      Thyroid: No thyromegaly.      Vascular: No JVD.      Trachea: No tracheal deviation.   Cardiovascular:      Rate and Rhythm: Normal rate and regular rhythm.      Pulses:           Dorsalis pedis pulses are 2+ on the right side and 2+ on the left side.        Posterior tibial pulses are 2+ on the right side and 2+ on the left side.      Heart sounds: Normal heart sounds. No murmur heard.   No friction rub. No gallop.    Pulmonary:      Effort: Pulmonary effort is normal. No respiratory distress.      Breath sounds: Normal breath sounds. No stridor. No wheezing or rales.   Abdominal:      General: Bowel sounds are normal. There is no distension.      Palpations: Abdomen is soft. There is no mass.      Tenderness: There is no abdominal tenderness. There is no guarding or rebound.      Hernia: No hernia is present.   Musculoskeletal:         General: No tenderness. Normal range of motion.      Cervical back: Normal range of motion and neck supple.   Feet:      Right foot:      Skin integrity: Skin integrity normal.      Left foot:      Skin integrity: Skin integrity normal.   Lymphadenopathy:      Cervical: No cervical adenopathy.   Skin:     General: Skin is warm and dry.      Findings: No rash.   Neurological:      Mental Status: He is alert and oriented to person, place, and time.      Cranial Nerves: No cranial nerve deficit.      Motor: No abnormal muscle tone.      Coordination: Coordination normal.   Psychiatric:         Behavior: Behavior normal.               Assessment/Plan   Medicare Risks and Personalized Health Plan  CMS Preventative Services Quick Reference  Advance Directive Discussion  Alcohol Misuse  Cardiovascular risk  Chronic Pain   Colon Cancer Screening  Dementia/Memory   Depression/Dysphoria  Diabetic Lab Screening   Fall Risk  Immunizations Discussed/Encouraged (specific immunizations; adacel Tdap and Shingrix  )  Prostate Cancer Screening   Tobacco Use/Dependance    The above risks/problems have been discussed with the patient.  Pertinent information has been shared with the patient in the After Visit Summary.  Follow up plans and orders are seen below in the Assessment/Plan Section.    Diagnoses and all orders for this visit:    1. Medicare annual wellness visit, subsequent (Primary)  Assessment & Plan:  The patient is here for health maintenance visit.  Currently, the patient consumes a healthy diet and has an inadequate exercise regimen (due to hip fracture).  Screening lab work is ordered.  Immunizations were reviewed today.  Advice and education was given regarding nutrition, aerobic exercise, routine dental evaluations, routine eye exams, reproductive health, cardiovascular risk reduction, sunscreen use, self skin examination (annual dermatology evaluations) and seatbelt use (general overall safety).  Further recommendations will be given if needed after lab evaluation.  Annual wellness evaluation is recommended.      Orders:  -     CBC & Differential  -     Comprehensive Metabolic Panel  -     Lipid Panel    2. Peripheral polyneuropathy  -     Vitamin B12  -     Folate  -     DULoxetine (CYMBALTA) 30 MG capsule; Take 1 capsule by mouth Daily.  Dispense: 30 capsule; Refill: 5    3. Screening for prostate cancer  -     PSA Screen    Follow Up:  Return in about 1 year (around 3/24/2022) for Medicare Wellness.     An After Visit Summary and PPPS were given to the patient.

## 2021-03-24 NOTE — ASSESSMENT & PLAN NOTE
The patient is here for health maintenance visit.  Currently, the patient consumes a healthy diet and has an inadequate exercise regimen (due to hip fracture).  Screening lab work is ordered.  Immunizations were reviewed today.  Advice and education was given regarding nutrition, aerobic exercise, routine dental evaluations, routine eye exams, reproductive health, cardiovascular risk reduction, sunscreen use, self skin examination (annual dermatology evaluations) and seatbelt use (general overall safety).  Further recommendations will be given if needed after lab evaluation.  Annual wellness evaluation is recommended.

## 2021-03-24 NOTE — PATIENT INSTRUCTIONS
Advance Care Planning and Advance Directives     You make decisions on a daily basis - decisions about where you want to live, your career, your home, your life. Perhaps one of the most important decisions you face is your choice for future medical care. Take time to talk with your family and your healthcare team and start planning today.  Advance Care Planning is a process that can help you:  · Understand possible future healthcare decisions in light of your own experiences  · Reflect on those decision in light of your goals and values  · Discuss your decisions with those closest to you and the healthcare professionals that care for you  · Make a plan by creating a document that reflects your wishes    Surrogate Decision Maker  In the event of a medical emergency, which has left you unable to communicate or to make your own decisions, you would need someone to make decisions for you.  It is important to discuss your preferences for medical treatment with this person while you are in good health.     Qualities of a surrogate decision maker:  • Willing to take on this role and responsibility  • Knows what you want for future medical care  • Willing to follow your wishes even if they don't agree with them  • Able to make difficult medical decisions under stressful circumstances    Advance Directives  These are legal documents you can create that will guide your healthcare team and decision maker(s) when needed. These documents can be stored in the electronic medical record.    · Living Will - a legal document to guide your care if you have a terminal condition or a serious illness and are unable to communicate. States vary by statute in document names/types, but most forms may include one or more of the following:        -  Directions regarding life-prolonging treatments        -  Directions regarding artificially provided nutrition/hydration        -  Choosing a healthcare decision maker        -  Direction  regarding organ/tissue donation    · Durable Power of  for Healthcare - this document names an -in-fact to make medical decisions for you, but it may also allow this person to make personal and financial decisions for you. Please seek the advice of an  if you need this type of document.    **Advance Directives are not required and no one may discriminate against you if you do not sign one.    Medical Orders  Many states allow specific forms/orders signed by your physician to record your wishes for medical treatment in your current state of health. This form, signed in personal communication with your physician, addresses resuscitation and other medical interventions that you may or may not want.      For more information or to schedule a time with a [facility] Advance Care Planning Facilitator contact: [facility] or call [phone] and someone will contact you directly.    Advance Directive    Advance directives are legal documents that let you make choices ahead of time about your health care and medical treatment in case you become unable to communicate for yourself. Advance directives are a way for you to make known your wishes to family, friends, and health care providers. This can let others know about your end-of-life care if you become unable to communicate.  Discussing and writing advance directives should happen over time rather than all at once. Advance directives can be changed depending on your situation and what you want, even after you have signed the advance directives.  There are different types of advance directives, such as:  · Medical power of .  · Living will.  · Do not resuscitate (DNR) or do not attempt resuscitation (DNAR) order.  Health care proxy and medical power of   A health care proxy is also called a health care agent. This is a person who is appointed to make medical decisions for you in cases where you are unable to make the  decisions yourself. Generally, people choose someone they know well and trust to represent their preferences. Make sure to ask this person for an agreement to act as your proxy. A proxy may have to exercise judgment in the event of a medical decision for which your wishes are not known.  A medical power of  is a legal document that names your health care proxy. Depending on the laws in your state, after the document is written, it may also need to be:  · Signed.  · Notarized.  · Dated.  · Copied.  · Witnessed.  · Incorporated into your medical record.  You may also want to appoint someone to manage your money in a situation in which you are unable to do so. This is called a durable power of  for finances. It is a separate legal document from the durable power of  for health care. You may choose the same person or someone different from your health care proxy to act as your agent in money matters.  If you do not appoint a proxy, or if there is a concern that the proxy is not acting in your best interests, a court may appoint a guardian to act on your behalf.  Living will  A living will is a set of instructions that state your wishes about medical care when you cannot express them yourself. Health care providers should keep a copy of your living will in your medical record. You may want to give a copy to family members or friends. To alert caregivers in case of an emergency, you can place a card in your wallet to let them know that you have a living will and where they can find it. A living will is used if you become:  · Terminally ill.  · Disabled.  · Unable to communicate or make decisions.  Items to consider in your living will include:  · To use or not to use life-support equipment, such as dialysis machines and breathing machines (ventilators).  · A DNR or DNAR order. This tells health care providers not to use cardiopulmonary resuscitation (CPR) if breathing or heartbeat stops.  · To use  or not to use tube feeding.  · To be given or not to be given food and fluids.  · Comfort (palliative) care when the goal becomes comfort rather than a cure.  · Donation of organs and tissues.  A living will does not give instructions for distributing your money and property if you should pass away.  DNR or DNAR  A DNR or DNAR order is a request not to have CPR in the event that your heart stops beating or you stop breathing. If a DNR or DNAR order has not been made and shared, a health care provider will try to help any patient whose heart has stopped or who has stopped breathing. If you plan to have surgery, talk with your health care provider about how your DNR or DNAR order will be followed if problems occur.  What if I do not have an advance directive?  If you do not have an advance directive, some states assign family decision makers to act on your behalf based on how closely you are related to them. Each state has its own laws about advance directives. You may want to check with your health care provider, , or state representative about the laws in your state.  Summary  · Advance directives are the legal documents that allow you to make choices ahead of time about your health care and medical treatment in case you become unable to tell others about your care.  · The process of discussing and writing advance directives should happen over time. You can change the advance directives, even after you have signed them.  · Advance directives include DNR or DNAR orders, living gutierrez, and designating an agent as your medical power of .  This information is not intended to replace advice given to you by your health care provider. Make sure you discuss any questions you have with your health care provider.  Document Revised: 01/28/2021 Document Reviewed: 07/16/2020  Elsevier Patient Education © 2021 [employer]      Preventive Care 65 Years and Older, Male  Preventive care refers to lifestyle choices  and visits with your health care provider that can promote health and wellness. This includes:  · A yearly physical exam. This is also called an annual well check.  · Regular dental and eye exams.  · Immunizations.  · Screening for certain conditions.  · Healthy lifestyle choices, such as diet and exercise.  What can I expect for my preventive care visit?  Physical exam  Your health care provider will check:  · Height and weight. These may be used to calculate body mass index (BMI), which is a measurement that tells if you are at a healthy weight.  · Heart rate and blood pressure.  · Your skin for abnormal spots.  Counseling  Your health care provider may ask you questions about:  · Alcohol, tobacco, and drug use.  · Emotional well-being.  · Home and relationship well-being.  · Sexual activity.  · Eating habits.  · History of falls.  · Memory and ability to understand (cognition).  · Work and work environment.  What immunizations do I need?    Influenza (flu) vaccine  · This is recommended every year.  Tetanus, diphtheria, and pertussis (Tdap) vaccine  · You may need a Td booster every 10 years.  Varicella (chickenpox) vaccine  · You may need this vaccine if you have not already been vaccinated.  Zoster (shingles) vaccine  · You may need this after age 60.  Pneumococcal conjugate (PCV13) vaccine  · One dose is recommended after age 65.  Pneumococcal polysaccharide (PPSV23) vaccine  · One dose is recommended after age 65.  Measles, mumps, and rubella (MMR) vaccine  · You may need at least one dose of MMR if you were born in 1957 or later. You may also need a second dose.  Meningococcal conjugate (MenACWY) vaccine  · You may need this if you have certain conditions.  Hepatitis A vaccine  · You may need this if you have certain conditions or if you travel or work in places where you may be exposed to hepatitis A.  Hepatitis B vaccine  · You may need this if you have certain conditions or if you travel or work in places  where you may be exposed to hepatitis B.  Haemophilus influenzae type b (Hib) vaccine  · You may need this if you have certain conditions.  You may receive vaccines as individual doses or as more than one vaccine together in one shot (combination vaccines). Talk with your health care provider about the risks and benefits of combination vaccines.  What tests do I need?  Blood tests  · Lipid and cholesterol levels. These may be checked every 5 years, or more frequently depending on your overall health.  · Hepatitis C test.  · Hepatitis B test.  Screening  · Lung cancer screening. You may have this screening every year starting at age 55 if you have a 30-pack-year history of smoking and currently smoke or have quit within the past 15 years.  · Colorectal cancer screening. All adults should have this screening starting at age 50 and continuing until age 75. Your health care provider may recommend screening at age 45 if you are at increased risk. You will have tests every 1-10 years, depending on your results and the type of screening test.  · Prostate cancer screening. Recommendations will vary depending on your family history and other risks.  · Diabetes screening. This is done by checking your blood sugar (glucose) after you have not eaten for a while (fasting). You may have this done every 1-3 years.  · Abdominal aortic aneurysm (AAA) screening. You may need this if you are a current or former smoker.  · Sexually transmitted disease (STD) testing.  Follow these instructions at home:  Eating and drinking  · Eat a diet that includes fresh fruits and vegetables, whole grains, lean protein, and low-fat dairy products. Limit your intake of foods with high amounts of sugar, saturated fats, and salt.  · Take vitamin and mineral supplements as recommended by your health care provider.  · Do not drink alcohol if your health care provider tells you not to drink.  · If you drink alcohol:  ? Limit how much you have to 0-2 drinks  a day.  ? Be aware of how much alcohol is in your drink. In the [place] one drink equals one 12 oz bottle of beer (355 mL), one 5 oz glass of wine (148 mL), or one 1½ oz glass of hard liquor (44 mL).  Lifestyle  · Take daily care of your teeth and gums.  · Stay active. Exercise for at least 30 minutes on 5 or more days each week.  · Do not use any products that contain nicotine or tobacco, such as cigarettes, e-cigarettes, and chewing tobacco. If you need help quitting, ask your health care provider.  · If you are sexually active, practice safe sex. Use a condom or other form of protection to prevent STIs (sexually transmitted infections).  · Talk with your health care provider about taking a low-dose aspirin or statin.  What's next?  · Visit your health care provider once a year for a well check visit.  · Ask your health care provider how often you should have your eyes and teeth checked.  · Stay up to date on all vaccines.  This information is not intended to replace advice given to you by your health care provider. Make sure you discuss any questions you have with your health care provider.  Document Revised: 2019 Document Reviewed: 2019  [employer] Patient Education ©  [employer]    Medicare Wellness  Personal Prevention Plan of Service     Date of Office Visit:  2021  Encounter Provider:  Neno Ocampo MD  Place of Service:  [facility]  Patient Name: Ubaldo Mcleod  :  1950    As part of the Medicare Wellness portion of your visit today, we are providing you with this personalized preventive plan of services (PPPS). This plan is based upon recommendations of the [place] Preventive Services Task Force (USPSTF) and the Advisory Committee on Immunization Practices (ACIP).    This lists the preventive care services that should be considered, and provides dates of when you are due. Items listed as completed are up-to-date and do not  require any further intervention.    Health Maintenance   Topic Date Due   • Hepatitis B (1 of 3 - Risk 3-dose series) Never done   • TDAP/TD VACCINES (1 - Tdap) Never done   • ZOSTER VACCINE (1 of 2) Never done   • COVID-19 Vaccine (2 - Pfizer 2-dose series) 03/19/2021   • INFLUENZA VACCINE  03/24/2022 (Originally 8/1/2020)   • ANNUAL WELLNESS VISIT  03/24/2022   • COLONOSCOPY  12/27/2026   • HEPATITIS C SCREENING  Completed   • Pneumococcal Vaccine 65+  Completed   • AAA SCREEN (ONE-TIME)  Completed   • MENINGOCOCCAL VACCINE  Aged Out       Orders Placed This Encounter   Procedures   • Comprehensive Metabolic Panel   • Lipid Panel   • PSA Screen   • Vitamin B12   • Folate   • CBC & Differential     Order Specific Question:   Manual Differential     Answer:   No       Return in about 1 year (around 3/24/2022) for Medicare Wellness.

## 2021-10-07 NOTE — TELEPHONE ENCOUNTER
Caller: Ubaldo Mcleod    Relationship to patient: Self    Best call back number: [phone]     Chief complaint: CHEST PAIN AND BACK PAIN    Patient directed to call 911 or go to their nearest emergency room.     Patient verbalized understanding: [x] Yes  [] No  If no, why?    Additional notes:

## 2021-10-10 NOTE — ED PROVIDER NOTES
Subjective   70-year-old male who presents for evaluation of fall and secondary facial injury.  The patient reports that he was walking through his house without the lights on at 6:30 AM this morning when he tripped fell forward and struck his face against a [place] countertop.  He denies any loss of consciousness.  He does not take any blood thinners.  He does report mild headache.  He denies any pain throughout his neck or back.  No reported chest or abdominal pain.  No reported nausea or vomiting.  No reported change in bowel or urinary function.  He denies any recent infectious symptoms including no fever, bodies, or chills.  No acute respiratory symptoms.  He has not been diagnosed with COVID-19 and has been vaccinated against COVID-19.  The patient did suffer mild abrasions to his right lateral knee, his right elbow, his left forearm, and his left wrist.  However, he does not have any bony deformity or significant complaints in those areas.  And he ranges all extremities without deficit and exhibits normal strength.  No focal neurological deficits.          Review of Systems   Constitutional: Negative for chills, fatigue and fever.   HENT: Positive for facial swelling. Negative for congestion, ear pain, postnasal drip, sinus pressure and sore throat.    Eyes: Negative for pain, redness and visual disturbance.   Respiratory: Negative for cough, chest tightness and shortness of breath.    Cardiovascular: Negative for chest pain, palpitations and leg swelling.   Gastrointestinal: Negative for abdominal pain, anal bleeding, blood in stool, diarrhea, nausea and vomiting.   Endocrine: Negative for polydipsia and polyuria.   Genitourinary: Negative for difficulty urinating, dysuria, frequency and urgency.   Musculoskeletal: Negative for arthralgias, back pain and neck pain.   Skin: Positive for wound. Negative for pallor and rash.   Allergic/Immunologic: Negative for environmental allergies and immunocompromised  state.   Neurological: Negative for dizziness, weakness and headaches.   Hematological: Negative for adenopathy.   Psychiatric/Behavioral: Negative for confusion, self-injury and suicidal ideas. The patient is not nervous/anxious.    All other systems reviewed and are negative.      Past Medical History:   Diagnosis Date   • Chronic hepatitis C with cirrhosis (HCC)    • ED (erectile dysfunction) of non-organic origin    • GERD (gastroesophageal reflux disease)    • Hemochromatosis    • History of allergy    • History of cirrhosis    • Homocysteinemia    • Porphyria cutanea tarda (HCC)    • Weight loss        No Known Allergies    Past Surgical History:   Procedure Laterality Date   • CATARACT EXTRACTION WITH INTRAOCULAR LENS IMPLANT      dec 2016 - has 3 surgries on left eye and 1 on the right eye.    • COLONOSCOPY  2016   • ENDOSCOPY  2016   • EYE SURGERY     • HIP TROCHANTERIC NAILING WITH INTRAMEDULLARY HIP SCREW Right 1/28/2021    Procedure: HIP TROCHANTERIC NAILING WITH INTRAMEDULLARY HIP SCREW RIGHT;  Surgeon: Brenden Sharp Jr., MD;  Location: [facility];  Service: Orthopedics;  Laterality: Right;   • REFRACTIVE SURGERY  06/2017    follow up surgery on both eyes        Family History   Problem Relation Age of Onset   • Cancer Mother    • No Known Problems Father    • No Known Problems Other    • Cancer Sister        Social History     Socioeconomic History   • Marital status:    Tobacco Use   • Smoking status: Current Every Day Smoker     Packs/day: 1.50     Years: 0.00     Pack years: 0.00     Types: Cigarettes     Start date: 1966   • Smokeless tobacco: Never Used   • Tobacco comment:  · 1 - 1 1/2 PACKS PER DAY, FOR THE PAST 50 YEARS   Vaping Use   • Vaping Use: Never used   Substance and Sexual Activity   • Alcohol use: Yes     Comment: 2 bottles of bourbon a week   • Drug use: No   • Sexual activity: Yes     Partners: Female           Objective   Physical Exam  Vitals and nursing note reviewed.    Constitutional:       General: He is not in acute distress.     Appearance: Normal appearance. He is well-developed. He is ill-appearing. He is not toxic-appearing or diaphoretic.      Comments: Thin patient, poorly nourished due to underlying alcoholism   HENT:      Head: Normocephalic. Contusion and laceration present. No right periorbital erythema or left periorbital erythema.      Jaw: Tenderness present.        Right Ear: External ear normal.      Left Ear: External ear normal.      Nose: Nose normal.   Eyes:      General: Lids are normal.      Pupils: Pupils are equal, round, and reactive to light.   Neck:      Trachea: No tracheal deviation.   Cardiovascular:      Rate and Rhythm: Normal rate and regular rhythm.      Pulses: No decreased pulses.      Heart sounds: Normal heart sounds. No murmur heard.  No friction rub. No gallop.    Pulmonary:      Effort: Pulmonary effort is normal. No respiratory distress.      Breath sounds: Normal breath sounds. No decreased breath sounds, wheezing, rhonchi or rales.   Abdominal:      General: Bowel sounds are normal.      Palpations: Abdomen is soft.      Tenderness: There is no abdominal tenderness. There is no guarding or rebound.   Musculoskeletal:         General: No deformity. Normal range of motion.      Cervical back: Normal range of motion and neck supple.   Lymphadenopathy:      Cervical: No cervical adenopathy.   Skin:     General: Skin is warm and dry.      Findings: No rash.   Neurological:      Mental Status: He is alert and oriented to person, place, and time.      Cranial Nerves: No cranial nerve deficit.      Sensory: No sensory deficit.   Psychiatric:         Speech: Speech normal.         Behavior: Behavior normal.         Thought Content: Thought content normal.         Judgment: Judgment normal.         Laceration Repair    Date/Time: 10/10/2021 5:45 PM  Performed by: Nino Wolff MD  Authorized by: Nino Wolff MD     Consent:      Consent obtained:  Verbal    Consent given by:  Patient    Risks discussed:  Infection, pain, retained foreign body, poor cosmetic result, need for additional repair, nerve damage and poor wound healing    Alternatives discussed:  No treatment and observation  Anesthesia (see MAR for exact dosages):     Anesthesia method:  Local infiltration    Local anesthetic:  Lidocaine 1% WITH epi  Laceration details:     Location:  Face    Face location:  R eyebrow    Length (cm):  3    Depth (mm):  4  Repair type:     Repair type:  Simple  Pre-procedure details:     Preparation:  Patient was prepped and draped in usual sterile fashion  Exploration:     Hemostasis achieved with:  Direct pressure    Wound exploration: wound explored through full range of motion and entire depth of wound probed and visualized      Wound extent: no fascia violation noted, no foreign bodies/material noted, no muscle damage noted, no nerve damage noted, no underlying fracture noted and no vascular damage noted      Contaminated: no    Treatment:     Area cleansed with:  Betadine    Amount of cleaning:  Standard    Irrigation solution:  Sterile saline    Irrigation volume:  500 ml    Irrigation method:  Syringe    Visualized foreign bodies/material removed: no    Skin repair:     Repair method:  Sutures    Suture size:  4-0    Suture material:  Nylon    Suture technique:  Simple interrupted    Number of sutures:  9  Approximation:     Approximation:  Close  Post-procedure details:     Dressing:  Open (no dressing)    Patient tolerance of procedure:  Tolerated well, no immediate complications  Laceration Repair    Date/Time: 10/10/2021 5:46 PM  Performed by: Nino Wolff MD  Authorized by: Nino Wolff MD     Consent:     Consent obtained:  Verbal    Consent given by:  Patient    Risks discussed:  Infection, pain, poor cosmetic result, retained foreign body, need for additional repair, nerve damage and poor wound healing     Alternatives discussed:  No treatment and observation  Anesthesia (see MAR for exact dosages):     Anesthesia method:  Local infiltration    Local anesthetic:  Lidocaine 1% WITH epi  Laceration details:     Location:  Face    Face location:  Nose    Length (cm):  1    Depth (mm):  2  Repair type:     Repair type:  Simple  Pre-procedure details:     Preparation:  Patient was prepped and draped in usual sterile fashion  Exploration:     Hemostasis achieved with:  Direct pressure    Wound exploration: wound explored through full range of motion and entire depth of wound probed and visualized      Wound extent: underlying fracture      Wound extent: no fascia violation noted, no foreign bodies/material noted, no muscle damage noted, no nerve damage noted and no vascular damage noted      Contaminated: no    Treatment:     Area cleansed with:  Betadine    Amount of cleaning:  Standard    Irrigation solution:  Sterile saline    Irrigation volume:  500 ml    Irrigation method:  Syringe    Visualized foreign bodies/material removed: no    Skin repair:     Repair method:  Sutures    Suture size:  4-0    Suture material:  Nylon    Suture technique:  Simple interrupted    Number of sutures:  4  Approximation:     Approximation:  Close  Post-procedure details:     Dressing:  Open (no dressing)    Patient tolerance of procedure:  Tolerated well, no immediate complications               ED Course                                           MDM  Number of Diagnoses or Management Options  Abrasions of multiple sites: new and requires workup  Facial contusion, initial encounter: new and requires workup  Facial laceration, initial encounter: new and requires workup  Fall, initial encounter: new and requires workup  Open fracture of nasal bone, initial encounter: new and requires workup  Diagnosis management comments: CT scan of the head and face showed no acute intracranial injury but does show nasal bone fractures.    2 lacerations  were repaired, 1 involving the right eyebrow, and one over the nasal bridge were repaired with simple interrupted sutures.  A total of 13 was placed.    The patient will be discharged with the advice to keep the wound clean and return to the ER in 5 to 7 days for suture removal.    Discharged home appearing well.       Amount and/or Complexity of Data Reviewed  Clinical lab tests: ordered and reviewed  Tests in the radiology section of CPT®: ordered and reviewed  Review and summarize past medical records: yes  Independent visualization of images, tracings, or specimens: yes        Final diagnoses:   Facial laceration, initial encounter   Facial contusion, initial encounter   Fall, initial encounter   Abrasions of multiple sites   Open fracture of nasal bone, initial encounter       ED Disposition  ED Disposition     ED Disposition Condition Comment    Discharge Stable           [facility] Emergency Department  [address] [id number] [phone]  In 1 week  For suture removal         Medication List      Changed    cyanocobalamin 1000 MCG/ML injection  INJECT 1 MILLILITER( 1000 MCG) INTRAMUSCULARLY MONTHLY  What changed: See the new instructions.             Nino Wolff MD  10/10/21 2918

## 2021-10-10 NOTE — DISCHARGE INSTRUCTIONS
Wound clean by cleaning it twice a day first by dabbing with a soapy rag, did rinse by dabbing with a wet rag.    Return to the ER in 5 to 7 days to have sutures removed.    Return to the ER sooner with further concern.

## 2021-10-12 NOTE — TELEPHONE ENCOUNTER
Caller: Ubaldo Mcleod    Relationship: Self    Best call back number: [phone]    What is the best time to reach you: ANYTIME    Who are you requesting to speak with (clinical staff, provider,  specific staff member):     What was the call regarding: PATIENT DID WANT TO SPECIFY REQUESTING A CALL BACK    Do you require a callback: YES

## 2021-10-13 NOTE — PROGRESS NOTES
Ubaldo Mcleod is a 70 y.o. male who presents today for Back Pain      Patient presents today with back pain. Onset started Monday, 10/4/21 (3 out of 10 just sitting), increase until 10/8/21 (6 out 10 just sitting), then getting better through today, 4 out of 10 while sitting, 8 of 10 when getting up. On the eighth he was seen by the urgent treatment center. They ordered an x-ray of his hip to ensure that the hardware was still in place which it was. They give him a Toradol injection and started him on a steroid taper. Located at the lower central back, above his hip line, all the way across his muscles. Stabbing, spasming pain that will alleviate a bit while staying still, but intense when standing.  He pulled out a walker today and found it of great relief (4 of 10 with walker while standing). He uses acetaminophen daily over the last few days, but not feeling it is giving much relief. He finds relief and comfort from heating pad sitting in his recliner. Over the years, he has had similar back pain, but this bad or not for this long.     Starting last month, getting worse, he gets sudden urges to urinate, day and night. Will have incontinence from time to time with the urge but its only a few drops at a time. He will get very little notice, sharp urge, will have some spillage, but will have full stream once he reaches the bathroom. This has never happened before. Denies difficulty urinating, foul smelling urine, difficulty defecating, dysuria, fever, night sweats, chills, or flank pain.         Review of Systems   Constitutional: Negative for chills, diaphoresis, fatigue and fever.   Respiratory: Negative for chest tightness and shortness of breath.    Cardiovascular: Negative for chest pain, palpitations and leg swelling.   Genitourinary: Positive for urgency and urinary incontinence. Negative for decreased urine volume, difficulty urinating, dysuria, flank pain, frequency, hematuria and penile pain.  "  Musculoskeletal: Positive for back pain.   Skin: Negative for rash.   Neurological: Negative for dizziness, syncope, light-headedness and headache.        The following portions of the patient's history were reviewed and updated as appropriate: allergies, current medications, past family history, past medical history, past social history, past surgical history and problem list.    Current Outpatient Medications on File Prior to Visit   Medication Sig Dispense Refill   • acetaminophen (TYLENOL) 325 MG tablet Take 2 tablets by mouth Every 4 (Four) Hours As Needed for Mild Pain .     • cyanocobalamin 1000 MCG/ML injection INJECT 1 MILLILITER( 1000 MCG) INTRAMUSCULARLY MONTHLY (Patient taking differently: TOOK ABOUT 3 WEEKS AGO) 10 mL 2   • DULoxetine (CYMBALTA) 30 MG capsule Take 1 capsule by mouth Daily. 30 capsule 5   • folic acid (FOLVITE) 1 MG tablet TAKE 1 TABLET BY MOUTH EVERY DAY 90 tablet 3   • pantoprazole (PROTONIX) 40 MG EC tablet TAKE 1 TABLET BY MOUTH TWICE DAILY 180 tablet 1   • polyethylene glycol (MIRALAX) 17 g packet Take 17 g by mouth Daily As Needed (constipation).     • predniSONE (DELTASONE) 10 MG tablet DAILY TAPER - 6/6/5/5/4/4/3/3/2/2/1/1 THEN D/C 42 tablet 0   • Syringe/Needle, Disp, (B-D 3CC LUER-KISHA SYR 25GX1\") 25G X 1\" 3 ML misc USE AS DIRECTED EVERY MONTH 10 each 1   • tamsulosin (FLOMAX) 0.4 MG capsule 24 hr capsule TAKE 1 CAPSULE BY MOUTH AT BEDTIME 90 capsule 0     No current facility-administered medications on file prior to visit.       No Known Allergies     Visit Vitals  /82   Pulse 99   Temp 98.3 °F (36.8 °C)   Ht 177.8 cm (70\")   Wt 76.6 kg (168 lb 12.8 oz)   SpO2 100%   BMI 24.22 kg/m²        Physical Exam  Constitutional:       General: He is not in acute distress.     Appearance: Normal appearance. He is not ill-appearing, toxic-appearing or diaphoretic.   Cardiovascular:      Rate and Rhythm: Normal rate and regular rhythm.      Pulses: Normal pulses.      Heart sounds: " Normal heart sounds.   Pulmonary:      Effort: Pulmonary effort is normal.      Breath sounds: Normal breath sounds.   Abdominal:      General: Bowel sounds are normal.   Musculoskeletal:      Comments: No pain to palpitation. Patient describes area of pain to be sacrolumbar area, describes pain will extend bilaterally about 5 inches height, across his entire lower lumbar region.    Neurological:      General: No focal deficit present.      Mental Status: He is alert and oriented to person, place, and time.   Psychiatric:         Mood and Affect: Mood normal.         Behavior: Behavior normal.         Thought Content: Thought content normal.         Judgment: Judgment normal.               Problems Addressed this Visit        Genitourinary and Reproductive     Urge incontinence     Patient was unable to void in the office but future order was placed for UA.  Patient has no systemic signs of urinary tract infection at this time.  Patient was given referral to urology for further evaluation and treatment of urge incontinence.         Relevant Orders    Urinalysis With Microscopic - Urine, Clean Catch    Ambulatory Referral to Urology       Musculoskeletal and Injuries    Acute bilateral low back pain without sciatica - Primary     Worsening back pain.  Patient cannot take NSAIDs due to significant GERD and Marroquin's esophagus.  Patient will continue steroid taper.  He was given Flexeril to use for symptomatic relief.  He will continue Tylenol for symptomatic relief.  Patient was given referral to physical therapy for further evaluation and treatment.  RTC/ED precautions given.         Relevant Medications    cyclobenzaprine (FLEXERIL) 10 MG tablet    Other Relevant Orders    Ambulatory Referral to Physical Therapy Evaluate and treat (Completed)      Other Visit Diagnoses     Urinary urgency        Relevant Orders    Urinalysis With Microscopic - Urine, Clean Catch    Ambulatory Referral to Urology      Diagnoses        Codes Comments    Acute bilateral low back pain without sciatica    -  Primary ICD-10-CM: M54.50  ICD-9-CM: 724.2, 338.19     Urinary urgency     ICD-10-CM: R39.15  ICD-9-CM: 788.63     Urge incontinence     ICD-10-CM: N39.41  ICD-9-CM: 788.31           No follow-ups on file.    Parts of this office note have been dictated by voice recognition software. Grammatical and/or spelling errors may be present. I attest that I have reviewed the student note and that the components of the history of the present illness, the physical exam, and the assessment and plan documented were performed by me or were performed in my presence by the student and verified by me.      Neno Ocampo MD   10/14/2021

## 2021-10-14 PROBLEM — M54.50 ACUTE BILATERAL LOW BACK PAIN WITHOUT SCIATICA: Status: ACTIVE | Noted: 2021-01-01

## 2021-10-14 PROBLEM — N39.41 URGE INCONTINENCE: Status: ACTIVE | Noted: 2021-01-01

## 2021-10-14 NOTE — ASSESSMENT & PLAN NOTE
Patient was unable to void in the office but future order was placed for UA.  Patient has no systemic signs of urinary tract infection at this time.  Patient was given referral to urology for further evaluation and treatment of urge incontinence.

## 2021-10-14 NOTE — ASSESSMENT & PLAN NOTE
Worsening back pain.  Patient cannot take NSAIDs due to significant GERD and Marroquin's esophagus.  Patient will continue steroid taper.  He was given Flexeril to use for symptomatic relief.  He will continue Tylenol for symptomatic relief.  Patient was given referral to physical therapy for further evaluation and treatment.  RTC/ED precautions given.

## 2021-10-16 NOTE — TELEPHONE ENCOUNTER
10/16/2021 called and verified patient birth date, and informed him his xray had a healing fracture, and he was aware of this, and he said he was no better, but going to see pcp on Mondaypar[place]

## 2021-10-18 NOTE — PROGRESS NOTES
Follow Up Office Visit      Patient Name: Ubaldo Mcleod  : 1950   MRN: [id number]     Chief Complaint:    Chief Complaint   Patient presents with   • Follow-up     back gotten worse       History of Present Illness: Ubaldo Mcleod is a 70 y.o. male who is here today to follow up with back pain.  Patient says his hips do not hurt, but he does have a history of hip fracture.  His pain is in his low back near his sacrum and lumbar spine.  Patient says the pain wraps around his back and does not radiate.  Patient reports 3 recent falls.  Patient has previously been to physical therapy for his hip pain.  Patient clarifies and says that his hips are not hurting it is his lower back.  Patient does have low back pain that started while he was doing physical therapy and his physical therapist gave him exercises so he has these available at home.  Patient takes Tylenol which helps some.  Patient says he is taking ibuprofen periodically as well.  He has taken Flexeril which seems to help.  Patient reports a recent episode of fecal incontinence but this was due to following or stepping over a huge dog.  Patient denies urinary incontinence.      Review of systems was positive for lower back pain and fecal incontinence    Physical exam: Patient had tenderness palpation of the lumbar spine.  He had somatic dysfunction noted in lumbar and hip region.  Patient's mood affect was appropriate.      Subjective        I have reviewed and the following portions of the patient's history were updated as appropriate: past family history, past medical history, past social history, past surgical history and problem list.    Medications:     Current Outpatient Medications:   •  acetaminophen (TYLENOL) 325 MG tablet, Take 2 tablets by mouth Every 4 (Four) Hours As Needed for Mild Pain ., Disp:  , Rfl:   •  cyanocobalamin 1000 MCG/ML injection, INJECT 1 MILLILITER( 1000 MCG) INTRAMUSCULARLY MONTHLY (Patient taking  "differently: TOOK ABOUT 3 WEEKS AGO), Disp: 10 mL, Rfl: 2  •  cyclobenzaprine (FLEXERIL) 10 MG tablet, Take 1 tablet by mouth 3 (Three) Times a Day As Needed for Muscle Spasms., Disp: 90 tablet, Rfl: 0  •  DULoxetine (CYMBALTA) 30 MG capsule, Take 1 capsule by mouth Daily., Disp: 30 capsule, Rfl: 5  •  folic acid (FOLVITE) 1 MG tablet, TAKE 1 TABLET BY MOUTH EVERY DAY, Disp: 90 tablet, Rfl: 3  •  pantoprazole (PROTONIX) 40 MG EC tablet, TAKE 1 TABLET BY MOUTH TWICE DAILY, Disp: 180 tablet, Rfl: 1  •  polyethylene glycol (MIRALAX) 17 g packet, Take 17 g by mouth Daily As Needed (constipation)., Disp:  , Rfl:   •  predniSONE (DELTASONE) 10 MG tablet, DAILY TAPER - 6/6/5/5/4/4/3/3/2/2/1/1 THEN D/C, Disp: 42 tablet, Rfl: 0  •  Syringe/Needle, Disp, (B-D 3CC LUER-KISHA SYR 25GX1\") 25G X 1\" 3 ML misc, USE AS DIRECTED EVERY MONTH, Disp: 10 each, Rfl: 1  •  tamsulosin (FLOMAX) 0.4 MG capsule 24 hr capsule, TAKE 1 CAPSULE BY MOUTH AT BEDTIME, Disp: 90 capsule, Rfl: 0  •  lidocaine (LIDODERM) 5 %, Place 1 patch on the skin as directed by provider Daily. Remove & Discard patch within 12 hours or as directed by MD, Disp: 30 each, Rfl: 0    Allergies:   No Known Allergies    Objective     Physical Exam: Please see HPI for physical exam  Vital Signs:   Vitals:    10/18/21 1335   BP: 132/86   Pulse: 97   Temp: 98 °F (36.7 °C)   SpO2: 100%  Comment: was 118   Weight: 76.4 kg (168 lb 6.4 oz)   Height: 177.8 cm (70\")   PainSc:   8     Body mass index is 24.16 kg/m².          Assessment / Plan      Assessment/Plan:   Diagnoses and all orders for this visit:    1. Acute bilateral low back pain without sciatica (Primary)  -     XR Spine Lumbar 2 or 3 View; Future  -     lidocaine (LIDODERM) 5 %; Place 1 patch on the skin as directed by provider Daily. Remove & Discard patch within 12 hours or as directed by MD  Dispense: 30 each; Refill: 0  -     cyclobenzaprine (FLEXERIL) 10 MG tablet; Take 1 tablet by mouth 3 (Three) Times a Day As " Needed for Muscle Spasms.  Dispense: 90 tablet; Refill: 0    can send to PT if patient calls. He deferred consult today because he has exercises he can try at home. Xray ordered.     Differential includes low back strain, low suspicion for cauda equina syndrome although patient did have fecal incontinence he reports this was due to almost falling over his dog when he had to use the bathroom.    Follow Up:   Return if symptoms worsen or fail to improve.    Best Garcia, DO  [facility] [place]

## 2021-10-22 NOTE — TELEPHONE ENCOUNTER
Caller: Ubaldo Mcleod    Relationship: Self    Best call back number: [phone]    Who is your current provider:   ARMAND SIMENTAL MD     Who would you like your new provider to be:   NATHALIA NELSON DO     What are your reasons for transferring care:   PATIENT STATED THAT HE HAS A CRACK IN HIS BACK AND HAS BEEN SEEING   NATHALIA NELSON DO   FOR THIS MEDICAL ISSUES AND HAS SEEN   NATHALIA NELSON DO   FOR HIS LAST COUPLES OF APPOINTMENT   PATIENT WOULD LIKE TO CONTINUE CARE WITH NATHALIA NELSON DO FOR HIS MEDICAL CARE AND NEW PRIMARY PROVIDER TO BE   NATHALIA NELSON DO     Additional notes:   PATIENT WOULD LIKE A CALL BACK REGARDING THE TRANSFER OF CARE APPROVAL

## 2021-10-22 NOTE — PROGRESS NOTES
Subjective     Chief Complaint: Low back pain    Patient ID: Ubaldo Mcleod is a 70 y.o. male seen for consultation today at the request of  Best Garcia DO    History of Present Illness    This is a 70-year-old gentleman with a longstanding history of chronic low back pain.  2 weeks ago, the patient had an onset of worsening low back pain.  He denies injury or trauma prior to onset.  Symptoms have been progressive since that time.  Denies radiation of pain in the lower extremities, numbness/tingling, weakness, bowel or bladder dysfunction.  Ibuprofen somewhat helped his symptoms.  He has been using a walker for ambulatory assistance secondary to pain.  He is retired and lives a fairly sedentary style but did not use a walker for assistance before and did not have difficulty caring for himself prior to this acute back pain.    The following portions of the patient's history were reviewed and updated as appropriate: allergies, current medications, past family history, past medical history, past social history, past surgical history and problem list.    Family history:   Family History   Problem Relation Age of Onset   • Cancer Mother    • No Known Problems Father    • No Known Problems Other    • Cancer Sister    • Diabetes Brother    • Stroke Brother        Social history:   Social History     Socioeconomic History   • Marital status:    Tobacco Use   • Smoking status: Current Every Day Smoker     Packs/day: 1.50     Years: 0.00     Pack years: 0.00     Types: Cigarettes     Start date: 1966   • Smokeless tobacco: Never Used   • Tobacco comment:  · 1 - 1 1/2 PACKS PER DAY, FOR THE PAST 50 YEARS   Vaping Use   • Vaping Use: Never used   Substance and Sexual Activity   • Alcohol use: Yes     Comment: 2 bottles of bourbon a week   • Drug use: No   • Sexual activity: Yes     Partners: Female       Review of Systems   Constitutional: Negative for activity change, appetite change, chills, diaphoresis,  "fatigue, fever and unexpected weight change.   HENT: Negative for congestion, dental problem, drooling, ear discharge, ear pain, facial swelling, hearing loss, mouth sores, nosebleeds, postnasal drip, rhinorrhea, sinus pressure, sneezing, sore throat, tinnitus, trouble swallowing and voice change.    Eyes: Negative for photophobia, pain, discharge, redness, itching and visual disturbance.   Respiratory: Negative for apnea, cough, choking, chest tightness, shortness of breath, wheezing and stridor.    Cardiovascular: Negative for chest pain, palpitations and leg swelling.   Gastrointestinal: Negative for abdominal distention, abdominal pain, anal bleeding, blood in stool, constipation, diarrhea, nausea, rectal pain and vomiting.   Endocrine: Negative for cold intolerance, heat intolerance, polydipsia, polyphagia and polyuria.   Genitourinary: Negative for decreased urine volume, difficulty urinating, dysuria, enuresis, flank pain, frequency, genital sores, hematuria and urgency.   Musculoskeletal: Positive for back pain. Negative for arthralgias, gait problem, joint swelling, myalgias, neck pain and neck stiffness.   Skin: Negative for color change, pallor, rash and wound.   Allergic/Immunologic: Negative for environmental allergies, food allergies and immunocompromised state.   Neurological: Negative for dizziness, tremors, seizures, syncope, facial asymmetry, speech difficulty, weakness, light-headedness, numbness and headaches.   Hematological: Negative for adenopathy. Does not bruise/bleed easily.   Psychiatric/Behavioral: Negative for agitation, behavioral problems, confusion, decreased concentration, dysphoric mood, hallucinations, self-injury, sleep disturbance and suicidal ideas. The patient is not nervous/anxious and is not hyperactive.    All other systems reviewed and are negative.      Objective   Blood pressure 108/60, temperature 97.1 °F (36.2 °C), temperature source Infrared, height 177.8 cm (70\"), " weight 77.1 kg (170 lb).  Body mass index is 24.39 kg/m².  Patient's Body mass index is 24.39 kg/m². indicating that he is within normal range (BMI 18.5-24.9). No BMI management plan needed..      Physical Exam  Constitutional:       General: He is not in acute distress.     Appearance: Normal appearance. He is not ill-appearing, toxic-appearing or diaphoretic.   HENT:      Head: Normocephalic and atraumatic.   Eyes:      Conjunctiva/sclera: Conjunctivae normal.   Pulmonary:      Effort: Pulmonary effort is normal.   Musculoskeletal:      Cervical back: Neck supple.      Thoracic back: Bony tenderness present. Decreased range of motion.      Lumbar back: Bony tenderness present.   Skin:     General: Skin is warm and dry.   Neurological:      Mental Status: He is alert and oriented to person, place, and time.      GCS: GCS eye subscore is 4. GCS verbal subscore is 5. GCS motor subscore is 6.      Sensory: Sensation is intact.      Motor: Motor function is intact.      Gait: Gait abnormal.      Comments: Ambulates bent over with a rolling walker  Strength is intact to direct testing in lower extremities and symmetric  Clonus is absent     Psychiatric:         Mood and Affect: Mood normal.         Behavior: Behavior normal.     Independent review of diagnostic studies  X-ray of the lumbar spine performed on 10/20/2021 demonstrates a compression deformity at T12 with 50 to 60% loss of height.  No bony retropulsion is noted.  Multilevel spondylosis.      Assessment/Plan     This is a 70-year-old gentleman with chronic low back pain and acute onset of worsening pain 2 weeks ago.  X-ray of the lumbar spine demonstrates a compression deformity at T12.  I have ordered an MRI to assess acuity of fracture.  I will follow-up with him next week after imaging has been performed.  I gave him a small prescription of tramadol for pain control.    Diagnoses and all orders for this visit:    1. Compression fracture of T12 vertebra,  initial encounter (formerly [facility]) (Primary)  -     MRI Lumbar Spine Without Contrast; Future    Other orders  -     traMADol (ULTRAM) 50 MG tablet; Take 1 tablet by mouth Every 8 (Eight) Hours As Needed for Moderate Pain .  Dispense: 30 tablet; Refill: 0        Return in about 1 week (around 10/29/2021), or if symptoms worsen or fail to improve.             Liz Pozo PA-C

## 2021-10-22 NOTE — TELEPHONE ENCOUNTER
Caller: Ubaldo Mcleod    Relationship: Self    Best call back number: [phone]    What is the best time to reach you: ANYTIME    Who are you requesting to speak with (clinical staff, provider,  specific staff member): DR NELSON     What was the call regarding: PATIENT CALLED AND STATED THAT HIS NEUROSURGEON DR ANN DUNN WOULD LIKE TO KNOW IF IT'S OK TO TAKE TRAMADOL    Do you require a callback: YES

## 2021-10-25 NOTE — PROGRESS NOTES
Subjective     Chief Complaint: Back pain    Patient ID: Ubaldo Mcleod is a 70 y.o. male is here today for follow-up.    History of Present Illness    This is a 70-year-old gentleman with a longstanding history of chronic low back pain.  2 weeks ago, the patient had sudden onset severe back pain.  He was evaluated with an x-ray which demonstrated a compression deformity and referred to our office.  An MRI was ordered and he presents today via televisit to discuss the results of this.  He continues to have 7/10 pain when up and moving.  He is retired and lives a sedentary lifestyle but performs ADLs independently.  Since his recent onset of back pain, he has been requiring a walker for ambulatory assistance.  Denies weakness or bowel or bladder dysfunction.    The following portions of the patient's history were reviewed and updated as appropriate: allergies, current medications, past family history, past medical history, past social history, past surgical history and problem list.    Family history:   Family History   Problem Relation Age of Onset   • Cancer Mother    • No Known Problems Father    • No Known Problems Other    • Cancer Sister    • Diabetes Brother    • Stroke Brother        Social history:   Social History     Socioeconomic History   • Marital status:    Tobacco Use   • Smoking status: Current Every Day Smoker     Packs/day: 1.50     Years: 0.00     Pack years: 0.00     Types: Cigarettes     Start date: 1966   • Smokeless tobacco: Never Used   • Tobacco comment:  · 1 - 1 1/2 PACKS PER DAY, FOR THE PAST 50 YEARS   Vaping Use   • Vaping Use: Never used   Substance and Sexual Activity   • Alcohol use: Yes     Comment: 2 bottles of bourbon a week   • Drug use: No   • Sexual activity: Yes     Partners: Female       Review of Systems   Constitutional: Negative for activity change, appetite change, chills, diaphoresis, fatigue, fever and unexpected weight change.   Gastrointestinal: Negative  "for nausea and vomiting.   Genitourinary: Negative for difficulty urinating.   Musculoskeletal: Positive for back pain and gait problem.   Neurological: Negative for dizziness, weakness and numbness.   All other systems reviewed and are negative.      Objective   Height 177.8 cm (70\"), weight 77.1 kg (170 lb).  Body mass index is 24.39 kg/m².  Patient's Body mass index is 24.39 kg/m². indicating that he is within normal range (BMI 18.5-24.9). No BMI management plan needed.      Physical Exam    Independent review of diagnostic studies  Lumbar MRI without contrast performed today on 10/25/2021 demonstrates acute compression deformity at T9 and T12    Assessment/Plan     This is a 70-year-old gentleman with 3 weeks of severe mid to low back pain. Lumbar MRI has been reviewed with Dr. Arreguin and demonstrates acute compression deformity at T9 and T12. I discussed these findings with the patient and his spouse and discussed treatment options to include vertebroplasty versus conservative treatment with medications and bracing. He would like to try bracing first before pursuing surgery.     I have contacted [employer] for the patient to be fitted for a custom TLSO brace. Custom bracing is needed due to abdominal obesity. The brace is to be worn when up and during activities. Will provide pain control, comfort and stabilization. He will follow-up in our office in 2 weeks, if symptoms are still persistent, he would be a candidate for a T9 and T12 vertebroplasty. He is to call with new or worsening symptoms in the meantime.    Diagnoses and all orders for this visit:    1. Closed wedge compression fracture of T9 vertebra, initial encounter ([facility]) (Primary)  -     Back Brace    2. Compression fracture of T12 vertebra, initial encounter ([facility])  -     Back Brace        Return in about 2 weeks (around 11/8/2021), or if symptoms worsen or fail to improve.             Liz Pozo PA-C          "

## 2021-11-08 NOTE — PROGRESS NOTES
Subjective     Chief Complaint: Thoracic compression fractures    Patient ID: Ubaldo Mcleod is a 70 y.o. male is here today for follow-up.    History of Present Illness    This is a 70-year-old man who was seen in my clinic several weeks ago for thoracic back pain.  He had a MRI which demonstrated to thoracic compression fractures.  His pain started after a fall in January.  He has been taking tramadol without relief.  He reports his pain goes as high as a 7 or 8 when he is trying to roll over in bed, but he is not having much in the way of pain when he is seated or walking.  He was recently fitted with a brace on the 27th.  He is not sure if the brace is helping him all that much.    The following portions of the patient's history were reviewed and updated as appropriate: allergies, current medications, past family history, past medical history, past social history, past surgical history and problem list.    Family history:   Family History   Problem Relation Age of Onset   • Cancer Mother    • No Known Problems Father    • No Known Problems Other    • Cancer Sister    • Diabetes Brother    • Stroke Brother        Social history:   Social History     Socioeconomic History   • Marital status:    Tobacco Use   • Smoking status: Current Every Day Smoker     Packs/day: 1.50     Years: 0.00     Pack years: 0.00     Types: Cigarettes     Start date: 1966   • Smokeless tobacco: Never Used   • Tobacco comment:  · 1 - 1 1/2 PACKS PER DAY, FOR THE PAST 50 YEARS   Vaping Use   • Vaping Use: Never used   Substance and Sexual Activity   • Alcohol use: Yes     Comment: 2 bottles of bourbon a week   • Drug use: No   • Sexual activity: Yes     Partners: Female       Review of Systems   Constitutional: Negative for activity change, appetite change, chills, diaphoresis, fatigue, fever and unexpected weight change.   Gastrointestinal: Negative for nausea and vomiting.   Genitourinary: Negative for difficulty urinating.  "  Musculoskeletal: Positive for back pain and gait problem.   Neurological: Negative for dizziness, weakness and numbness.   All other systems reviewed and are negative.      Objective   Blood pressure 108/62, pulse 118, resp. rate 20, height 177.8 cm (70\"), weight 76.5 kg (168 lb 9.6 oz), SpO2 99 %.  Body mass index is 24.19 kg/m².    Physical Exam  Constitutional:       General: He is not in acute distress.     Appearance: Normal appearance. He is not ill-appearing, toxic-appearing or diaphoretic.   HENT:      Head: Normocephalic and atraumatic.   Eyes:      Conjunctiva/sclera: Conjunctivae normal.   Pulmonary:      Effort: Pulmonary effort is normal.   Musculoskeletal:      Cervical back: Neck supple.      Thoracic back: Bony tenderness present. Decreased range of motion.      Lumbar back: Bony tenderness present.   Skin:     General: Skin is warm and dry.   Neurological:      Mental Status: He is alert and oriented to person, place, and time.      GCS: GCS eye subscore is 4. GCS verbal subscore is 5. GCS motor subscore is 6.      Sensory: Sensation is intact.      Motor: Motor function is intact.      Gait: Gait abnormal.      Comments: Ambulates bent over with a rolling walker  Strength is intact to direct testing in lower extremities and symmetric  Clonus is absent     Psychiatric:         Mood and Affect: Mood normal.         Behavior: Behavior normal.         Assessment/Plan     Independent Review of Radiographic Studies:      He has no new imaging for me to review.  I did review his MRI of the lumbar spine from October 25 which demonstrates compression fractures at T9 and T12.    Medical Decision Making:      I would like for him to stop taking tramadol and I gave him a prescription for oxycodone.  He has a history of hepatitis and cannot take acetaminophen.  He also has a history of gastric ulcers and cannot take ibuprofen.  He states that his pain is manageable and he would like to try additional " conservative treatment.  I will follow-up with him in about 2 weeks, or sooner if clinically indicated.    Diagnoses and all orders for this visit:    1. Compression fracture of T12 vertebra with delayed healing, subsequent encounter (Primary)    2. Closed wedge compression fracture of T9 vertebra with delayed healing, subsequent encounter    3. Chronic hepatitis C with cirrhosis (HCC)    4. Primary hypertension    5. Stage 3 chronic kidney disease, unspecified whether stage 3a or 3b CKD (HCC)    Other orders  -     oxyCODONE (Roxicodone) 5 MG immediate release tablet; Take 1 tablet by mouth Every 6 (Six) Hours As Needed for Severe Pain .  Dispense: 45 tablet; Refill: 0        No follow-ups on file.           This document signed by TREVA Arreguin MD November 8, 2021 15:01 EST

## 2021-11-29 NOTE — PROGRESS NOTES
Subjective     Chief Complaint: Follow-up compression fracture    Patient ID: Ubaldo Mcleod is a 70 y.o. male is here today for follow-up.    History of Present Illness    This is a 70-year-old who presented to our office with thoracic back pain and was found to have thoracic compression fractures on MRI.  He was treated with bracing and narcotics.  He reports today in follow-up.  His back pain has resolved.  He is no longer taking narcotics.  On occasion, he has paraspinous pain and spasms.  He has tried some home therapy exercises.  Denies radiating pain, weakness, numbness, bowel or bladder dysfunction.    The following portions of the patient's history were reviewed and updated as appropriate: allergies, current medications, past family history, past medical history, past social history, past surgical history and problem list.    Family history:   Family History   Problem Relation Age of Onset   • Cancer Mother    • No Known Problems Father    • No Known Problems Other    • Cancer Sister    • Diabetes Brother    • Stroke Brother        Social history:   Social History     Socioeconomic History   • Marital status:    Tobacco Use   • Smoking status: Current Every Day Smoker     Packs/day: 1.50     Years: 0.00     Pack years: 0.00     Types: Cigarettes     Start date: 1966   • Smokeless tobacco: Never Used   • Tobacco comment:  · 1 - 1 1/2 PACKS PER DAY, FOR THE PAST 50 YEARS   Vaping Use   • Vaping Use: Never used   Substance and Sexual Activity   • Alcohol use: Yes     Comment: 2 bottles of bourbon a week   • Drug use: No   • Sexual activity: Yes     Partners: Female       Review of Systems   Constitutional: Negative for activity change, appetite change, chills, diaphoresis, fatigue, fever and unexpected weight change.   Gastrointestinal: Negative for nausea and vomiting.   Genitourinary: Negative for difficulty urinating.   Musculoskeletal: Positive for back pain and gait problem.   Neurological:  "Negative for dizziness, weakness and numbness.   All other systems reviewed and are negative.      Objective   Resp. rate 18, height 177.8 cm (70\"), weight 77.1 kg (170 lb).  Body mass index is 24.39 kg/m².  Patient's Body mass index is 24.39 kg/m². indicating that he is within normal range (BMI 18.5-24.9). No BMI management plan needed..      Physical Exam  Constitutional:       Appearance: Normal appearance.   HENT:      Head: Normocephalic and atraumatic.   Eyes:      Conjunctiva/sclera: Conjunctivae normal.   Pulmonary:      Effort: Pulmonary effort is normal.   Musculoskeletal:      Cervical back: Neck supple.   Skin:     General: Skin is warm and dry.   Neurological:      General: No focal deficit present.      Mental Status: He is alert and oriented to person, place, and time.      GCS: GCS eye subscore is 4. GCS verbal subscore is 5. GCS motor subscore is 6.      Sensory: Sensation is intact.      Motor: Motor function is intact.      Gait: Gait abnormal (ambulates with cane).   Psychiatric:         Mood and Affect: Mood normal.         Behavior: Behavior normal.           Assessment/Plan     This is a 70-year-old gentleman who had 2 thoracic compression fractures.  His symptoms have resolved with conservative therapy.  There is no role for surgical intervention.  I reviewed signs and symptoms that would warrant a follow-up with our office.  I would be happy to see him back on as-needed basis.    Diagnoses and all orders for this visit:    1. Chronic bilateral low back pain without sciatica (Primary)  -     Ambulatory Referral to Physical Therapy Evaluate and treat        Return if symptoms worsen or fail to improve.             Liz Pozo PA-C        "

## 2021-12-13 NOTE — TELEPHONE ENCOUNTER
Provider:  Liz BAY  Caller: patient  Time of call:  3:12   Phone #:  [phone]  Surgery:  no  Surgery Date:    Last visit:   11/29/21  Next visit:     YUSRA:         Reason for call:     Patient called and said a couple of days after he saw Liz BAY everything changed.  He complains of low back pain that radiates into his left hip and runs down his LLE to the knee.    Patient states it's difficult to get comfortable , mayra at bedtime.  He has tried heat with little relief.  He said the pain is constant.    He wants to know if he can come back in to see her or do a tele visit?

## 2021-12-13 NOTE — TELEPHONE ENCOUNTER
I can see him Friday or can see any PA with opening this week   If he prefers televisit I can do that with him Thursday

## 2021-12-17 NOTE — PROGRESS NOTES
Subjective     Chief Complaint: Low back, left leg pain    Patient ID: Ubaldo Mcleod is a 70 y.o. male is here today for follow-up.    History of Present Illness    This is a 70-year-old gentleman that we have been following for thoracic compression fractures which resolved with conservatism.  He presents today with a 1 week history of low back pain radiating into his left buttock and posterior aspect of his left leg.  Denies injury prior to onset.  His pain is severe and constant, limiting his ability to get around.  He has been having to use a walker for ambulatory assistance.  He has taken Tylenol with no relief.  Denies lower extremity weakness, saddle anesthesia or bowel or bladder dysfunction.    The following portions of the patient's history were reviewed and updated as appropriate: allergies, current medications, past family history, past medical history, past social history, past surgical history and problem list.    Family history:   Family History   Problem Relation Age of Onset   • Cancer Mother    • No Known Problems Father    • No Known Problems Other    • Cancer Sister    • Diabetes Brother    • Stroke Brother        Social history:   Social History     Socioeconomic History   • Marital status:    Tobacco Use   • Smoking status: Current Every Day Smoker     Packs/day: 1.50     Years: 0.00     Pack years: 0.00     Types: Cigarettes     Start date: 1966   • Smokeless tobacco: Never Used   • Tobacco comment:  · 1 - 1 1/2 PACKS PER DAY, FOR THE PAST 50 YEARS   Vaping Use   • Vaping Use: Never used   Substance and Sexual Activity   • Alcohol use: Yes     Comment: 2 bottles of bourbon a week   • Drug use: No   • Sexual activity: Yes     Partners: Female       Review of Systems   Constitutional: Negative for activity change, appetite change, chills, diaphoresis, fatigue, fever and unexpected weight change.   HENT: Negative for congestion, dental problem, drooling, ear discharge, ear pain,  "facial swelling, hearing loss, mouth sores, nosebleeds, postnasal drip, rhinorrhea, sinus pressure, sinus pain, sneezing, sore throat, tinnitus, trouble swallowing and voice change.    Eyes: Negative for photophobia, pain, discharge, redness, itching and visual disturbance.   Respiratory: Negative for apnea, cough, choking, chest tightness, shortness of breath, wheezing and stridor.    Cardiovascular: Negative for chest pain, palpitations and leg swelling.   Gastrointestinal: Negative for abdominal distention, abdominal pain, anal bleeding, blood in stool, constipation, diarrhea, nausea, rectal pain and vomiting.   Endocrine: Negative for cold intolerance, heat intolerance, polydipsia, polyphagia and polyuria.   Genitourinary: Negative for decreased urine volume, difficulty urinating, dysuria, enuresis, flank pain, frequency, genital sores, hematuria, penile discharge, penile pain, penile swelling, scrotal swelling, testicular pain and urgency.   Musculoskeletal: Positive for back pain. Negative for arthralgias, gait problem, joint swelling, myalgias, neck pain and neck stiffness.   Skin: Negative for color change, pallor, rash and wound.   Allergic/Immunologic: Negative for environmental allergies, food allergies and immunocompromised state.   Neurological: Negative for dizziness, tremors, seizures, syncope, facial asymmetry, speech difficulty, weakness, light-headedness, numbness and headaches.   Hematological: Negative for adenopathy. Does not bruise/bleed easily.   Psychiatric/Behavioral: Negative for agitation, behavioral problems, confusion, decreased concentration, dysphoric mood, hallucinations, self-injury, sleep disturbance and suicidal ideas. The patient is not nervous/anxious and is not hyperactive.        Objective   Blood pressure 112/62, temperature 96.9 °F (36.1 °C), height 177.8 cm (70\"), weight 75.7 kg (166 lb 12.8 oz).  Body mass index is 23.93 kg/m².  Patient's Body mass index is 23.93 kg/m². " indicating that he is within normal range (BMI 18.5-24.9). No BMI management plan needed..      Physical Exam  Constitutional:       General: He is not in acute distress.     Appearance: Normal appearance. He is not ill-appearing, toxic-appearing or diaphoretic.   HENT:      Head: Normocephalic and atraumatic.   Eyes:      Conjunctiva/sclera: Conjunctivae normal.   Pulmonary:      Effort: Pulmonary effort is normal.   Musculoskeletal:      Lumbar back: Positive left straight leg raise test. Negative right straight leg raise test.   Skin:     General: Skin is warm and dry.   Neurological:      Mental Status: He is alert and oriented to person, place, and time.      GCS: GCS eye subscore is 4. GCS verbal subscore is 5. GCS motor subscore is 6.      Sensory: Sensation is intact.      Gait: Gait abnormal.      Comments: Antalgic gait.  Using a walker for assistance.  Strength is intact to direct testing and symmetric throughout.  Clonus is absent.  Positive straight leg on the left.  Negative on the right.   Psychiatric:         Mood and Affect: Mood normal.         Behavior: Behavior normal.         Independent review of diagnostic studies  MRI lumbar spine without contrast performed on 10/25/2021 demonstrates acute disc herniation at L5-S1 eccentric to the left contacting the exiting nerve root.  Please note that the patient has a sacralized vertebrae.  For counting purposes, the L5 vertebral body labeled on radiology report is actually S1.  I reviewed his imaging and x-ray with Dr. Arreguin.       Assessment/Plan     This is a 70-year-old gentleman with low back pain and left S1 radiculopathy.  He has a disc herniation at L5-S1 which is eccentric to the left, correlating with his symptoms.  Of note, I reviewed his imaging with Dr. Arreguin and he has a sacralized vertebrae as noted above.    We will try some conservative treatment to include gabapentin and physical therapy.  I have also placed a referral to pain  management.  He has a history of GI bleed many years ago, I discussed with his PCP and he is comfortable with him trying some Celebrex.  I reviewed the signs/symptoms of cauda equina syndrome with the patient and his wife.  I have instructed him to call should he have any new or worsening symptoms.  If symptoms do not improve with conservative treatment, he would likely be a candidate for a discectomy.  I will follow-up with him in 6 weeks, or sooner if clinically indicated.     Discussed importance of tobacco cessation with the patient and he is willing to attempt to quit.    Diagnoses and all orders for this visit:    1. Herniation of intervertebral disc between L5 and S1 (Primary)  -     Ambulatory Referral to Physical Therapy Evaluate and treat  -     Ambulatory Referral to Pain Management  -     gabapentin (NEURONTIN) 300 MG capsule; Take 1 capsule by mouth 3 (Three) Times a Day. Start taking QHS x 3 days, then BID x 3 days then TID  Dispense: 90 capsule; Refill: 1    2. Chronic bilateral low back pain with left-sided sciatica  -     Ambulatory Referral to Physical Therapy Evaluate and treat  -     Ambulatory Referral to Pain Management    Other orders  -     celecoxib (CeleBREX) 200 MG capsule; Take 1 capsule by mouth 2 (Two) Times a Day.  Dispense: 60 capsule; Refill: 2        Return in about 6 weeks (around 1/28/2022), or if symptoms worsen or fail to improve.             Liz Pozo PA-C

## 2021-12-17 NOTE — TELEPHONE ENCOUNTER
Documentation Only: I called and spoke with the patient and per Liz I told him that she had spoke with his PCP and that it is ok for him  to take Celebrex. She has called it in to his pharmacy. He was thankful for the call back.

## 2021-12-29 PROBLEM — R79.89 ELEVATED LFTS: Status: ACTIVE | Noted: 2021-01-01

## 2021-12-29 PROBLEM — E80.6 HYPERBILIRUBINEMIA: Status: ACTIVE | Noted: 2021-01-01

## 2021-12-29 PROBLEM — S72.002A CLOSED LEFT HIP FRACTURE (HCC): Status: ACTIVE | Noted: 2021-01-01
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
## 2021-12-29 PROBLEM — Z72.0 TOBACCO ABUSE: Status: ACTIVE | Noted: 2021-01-01

## 2021-12-29 PROBLEM — F10.10 ALCOHOL ABUSE: Status: ACTIVE | Noted: 2021-01-01

## 2021-12-29 NOTE — ANESTHESIA PROCEDURE NOTES
Airway  Urgency: elective    Airway not difficult    General Information and Staff    Patient location during procedure: OR  Anesthesiologist: Meek Maradiaga MD    Indications and Patient Condition  Indications for airway management: airway protection    Preoxygenated: yes  MILS not maintained throughout  Mask difficulty assessment: 1 - vent by mask    Final Airway Details  Final airway type: endotracheal airway      Successful airway: ETT  Cuffed: yes   Successful intubation technique: direct laryngoscopy  Endotracheal tube insertion site: oral  Blade: Beatriz  Blade size: 3  ETT size (mm): 7.5  Cormack-Lehane Classification: grade I - full view of glottis  Placement verified by: chest auscultation and capnometry   Measured from: lips  ETT/EBT  to lips (cm): 20  Number of attempts at approach: 1  Assessment: lips, teeth, and gum same as pre-op and atraumatic intubation    Additional Comments  Negative epigastric sounds, Breath sound equal bilaterally with symmetric chest rise and fall

## 2021-12-29 NOTE — ANESTHESIA PREPROCEDURE EVALUATION
Anesthesia Evaluation     Patient summary reviewed and Nursing notes reviewed   NPO Solid Status: > 8 hours  NPO Liquid Status: > 8 hours           Airway   Mallampati: I  No difficulty expected  Dental    (+) edentulous    Pulmonary    (+) decreased breath sounds,   Cardiovascular     Rhythm: regular  Rate: normal        Neuro/Psych  GI/Hepatic/Renal/Endo    (+)   hepatitis C, liver disease history of elevated LFT,     Musculoskeletal     Abdominal    Substance History      OB/GYN          Other                        Anesthesia Plan    ASA 3     general     intravenous induction     Anesthetic plan, all risks, benefits, and alternatives have been provided, discussed and informed consent has been obtained with: patient.

## 2021-12-29 NOTE — ANESTHESIA PROCEDURE NOTES
LEFT FI cath      Patient reassessed immediately prior to procedure    Patient location during procedure: pre-op  Start time: 12/29/2021 9:00 AM  Stop time: 12/29/2021 9:13 AM  Reason for block: procedure for pain and at surgeon's request  Performed by  CRNA: Balta Mauro CRNA  Assisted by: Mina Bolanos CRNA  Preanesthetic Checklist  Completed: patient identified, IV checked, site marked, risks and benefits discussed, surgical consent, monitors and equipment checked, pre-op evaluation and timeout performed  Prep:  Pt Position: supine  Sterile barriers:cap, gloves and mask  Prep: ChloraPrep  Patient monitoring: blood pressure monitoring, continuous pulse oximetry and EKG  Procedure    Sedation: no  Performed under: local infiltration  Guidance:ultrasound guided  Images:still images obtained, printed/placed on chart    Laterality:left  Block Type:fascia iliaca compartment  Injection Technique:catheter  Needle Type:echogenic  Needle Gauge:18 G  Resistance on Injection: none  Catheter Size:20 G (20g)  Cath Depth at skin: 14 cm    Medications Used: ropivacaine (NAROPIN) 0.5 % injection, 30 mL  Med administered at 12/29/2021 9:14 AM      Medications  Preservative Free Saline:30ml    Post Assessment  Injection Assessment: negative aspiration for heme, no paresthesia on injection and incremental injection  Patient Tolerance:comfortable throughout block  Complications:no  Additional Notes  Procedure:                 Pt placed in supine position.   The insertion site was prepped in sterile fashion with Chlorapreop and clear plastic drapes.  Analgesia was provided by skin infiltration at insertion site with Lidocaine 1% 3mls.  A B-Galindo 18 g , 4 inch echogenic Touhy needle was advance In-plane under ultrasound guidance. The   Anterior superior Iliac crest was initially visualized and the probe was directed slightly medially and slightly towards the umbilicus.  The course of the needle was tracked over the sartorius  muscle through the fascia Iliacus and into the anterior portion of the Iliacus muscle.  Major vessels where identified and avoided as where structures of the peritoneal cavity.  LA injection was made incrementally in 1-5ml amounts spread was visualized superiorly below fascia iliacus.  Injection was completed with negative aspiration of blood and negative intravascular injection.  Injection pressures where normal or minimal resistance.  A 20 g B-Galindo wire styleted catheter was then advance thru the needle and very easily placed in a superior or cephalad direction.  The catheter was secured at insertion site with exofin tissue adhesive, benzoin, and steristreps.  A CHG tegaderm dressing was placed over the insertion site and the nerve catheter labeled and capped.  Thank You.

## 2021-12-30 NOTE — ANESTHESIA POSTPROCEDURE EVALUATION
Patient: Ubaldo Mcleod    Procedure Summary     Date: 12/29/21 Room / Location:  [facility]  [facility]    Anesthesia Start: 1735 Anesthesia Stop:     Procedure: HIP HEMIARTHROPLASTY (Left Hip) Diagnosis:     Surgeons: Brenden Sharp Jr., MD Provider: Meek Maradiaga MD    Anesthesia Type: general ASA Status: 3          Anesthesia Type: general    Vitals  No vitals data found for the desired time range.          Post Anesthesia Care and Evaluation    Patient location during evaluation: PACU  Patient participation: complete - patient participated  Level of consciousness: awake and alert  Pain management: adequate  Airway patency: patent  Anesthetic complications: No anesthetic complications  PONV Status: none  Cardiovascular status: hemodynamically stable and acceptable  Respiratory status: nonlabored ventilation, acceptable and nasal cannula  Hydration status: acceptable

## 2022-01-01 ENCOUNTER — HOSPITAL ENCOUNTER (OUTPATIENT)
Facility: HOSPITAL | Age: 72
Discharge: HOME | End: 2022-04-12
Attending: INTERNAL MEDICINE | Admitting: INTERNAL MEDICINE

## 2022-01-01 ENCOUNTER — TELEPHONE (OUTPATIENT)
Dept: FAMILY MEDICINE CLINIC | Facility: CLINIC | Age: 72
End: 2022-01-01

## 2022-01-01 ENCOUNTER — OFFICE VISIT (OUTPATIENT)
Dept: SLEEP MEDICINE | Facility: HOSPITAL | Age: 72
End: 2022-01-01

## 2022-01-01 ENCOUNTER — OFFICE VISIT (OUTPATIENT)
Dept: UROLOGY | Facility: CLINIC | Age: 72
End: 2022-01-01

## 2022-01-01 ENCOUNTER — TELEPHONE (OUTPATIENT)
Dept: CARDIOLOGY | Facility: CLINIC | Age: 72
End: 2022-01-01

## 2022-01-01 ENCOUNTER — HOSPITAL ENCOUNTER (OUTPATIENT)
Dept: GENERAL RADIOLOGY | Facility: HOSPITAL | Age: 72
Discharge: HOME | End: 2022-01-24

## 2022-01-01 ENCOUNTER — TELEPHONE (OUTPATIENT)
Dept: NEUROSURGERY | Facility: CLINIC | Age: 72
End: 2022-01-01

## 2022-01-01 ENCOUNTER — OFFICE VISIT (OUTPATIENT)
Dept: FAMILY MEDICINE CLINIC | Facility: CLINIC | Age: 72
End: 2022-01-01

## 2022-01-01 ENCOUNTER — OUTSIDE FACILITY SERVICE (OUTPATIENT)
Dept: PAIN MEDICINE | Facility: CLINIC | Age: 72
End: 2022-01-01

## 2022-01-01 ENCOUNTER — TELEPHONE (OUTPATIENT)
Dept: PAIN MEDICINE | Facility: CLINIC | Age: 72
End: 2022-01-01

## 2022-01-01 ENCOUNTER — LAB (OUTPATIENT)
Dept: LAB | Facility: HOSPITAL | Age: 72
End: 2022-01-01

## 2022-01-01 ENCOUNTER — APPOINTMENT (OUTPATIENT)
Dept: GENERAL RADIOLOGY | Facility: HOSPITAL | Age: 72
End: 2022-01-01

## 2022-01-01 ENCOUNTER — PRE-ADMISSION TESTING (OUTPATIENT)
Dept: PREADMISSION TESTING | Facility: HOSPITAL | Age: 72
End: 2022-01-01

## 2022-01-01 ENCOUNTER — PATIENT ROUNDING (BHMG ONLY) (OUTPATIENT)
Dept: UROLOGY | Facility: CLINIC | Age: 72
End: 2022-01-01

## 2022-01-01 ENCOUNTER — TELEPHONE (OUTPATIENT)
Dept: UROLOGY | Facility: CLINIC | Age: 72
End: 2022-01-01

## 2022-01-01 ENCOUNTER — OFFICE VISIT (OUTPATIENT)
Dept: NEUROSURGERY | Facility: CLINIC | Age: 72
End: 2022-01-01

## 2022-01-01 ENCOUNTER — CLINICAL SUPPORT (OUTPATIENT)
Dept: CARDIOLOGY | Facility: CLINIC | Age: 72
End: 2022-01-01

## 2022-01-01 ENCOUNTER — PREP FOR SURGERY (OUTPATIENT)
Dept: OTHER | Facility: HOSPITAL | Age: 72
End: 2022-01-01

## 2022-01-01 ENCOUNTER — HOSPITAL ENCOUNTER (EMERGENCY)
Facility: HOSPITAL | Age: 72
Discharge: LEFT BEFORE DISCHARGE | End: 2022-02-09
Attending: STUDENT IN AN ORGANIZED HEALTH CARE EDUCATION/TRAINING PROGRAM

## 2022-01-01 ENCOUNTER — HOSPITAL ENCOUNTER (OUTPATIENT)
Dept: MRI IMAGING | Facility: HOSPITAL | Age: 72
Discharge: HOME | End: 2022-02-11
Admitting: PHYSICIAN ASSISTANT

## 2022-01-01 ENCOUNTER — CLINICAL SUPPORT (OUTPATIENT)
Dept: FAMILY MEDICINE CLINIC | Facility: CLINIC | Age: 72
End: 2022-01-01
Payer: MEDICARE

## 2022-01-01 ENCOUNTER — APPOINTMENT (OUTPATIENT)
Dept: CARDIOLOGY | Facility: HOSPITAL | Age: 72
End: 2022-01-01

## 2022-01-01 ENCOUNTER — DOCUMENTATION (OUTPATIENT)
Dept: CARDIOLOGY | Facility: CLINIC | Age: 72
End: 2022-01-01

## 2022-01-01 ENCOUNTER — HOSPITAL ENCOUNTER (INPATIENT)
Facility: HOSPITAL | Age: 72
LOS: 1 days | Discharge: NURSING, REHAB OR LONG-TERM CARE | End: 2022-02-17
Attending: RADIOLOGY | Admitting: INTERNAL MEDICINE

## 2022-01-01 ENCOUNTER — OFFICE VISIT (OUTPATIENT)
Dept: PAIN MEDICINE | Facility: CLINIC | Age: 72
End: 2022-01-01

## 2022-01-01 ENCOUNTER — PATIENT ROUNDING (BHMG ONLY) (OUTPATIENT)
Dept: PAIN MEDICINE | Facility: CLINIC | Age: 72
End: 2022-01-01

## 2022-01-01 ENCOUNTER — OFFICE VISIT (OUTPATIENT)
Dept: CARDIOLOGY | Facility: CLINIC | Age: 72
End: 2022-01-01

## 2022-01-01 VITALS — HEIGHT: 69 IN | OXYGEN SATURATION: 96 % | BODY MASS INDEX: 25.48 KG/M2 | WEIGHT: 172 LBS | HEART RATE: 108 BPM

## 2022-01-01 VITALS
HEART RATE: 64 BPM | WEIGHT: 173.4 LBS | DIASTOLIC BLOOD PRESSURE: 74 MMHG | TEMPERATURE: 97.8 F | HEIGHT: 69 IN | SYSTOLIC BLOOD PRESSURE: 110 MMHG | BODY MASS INDEX: 25.68 KG/M2 | OXYGEN SATURATION: 97 %

## 2022-01-01 VITALS
SYSTOLIC BLOOD PRESSURE: 108 MMHG | OXYGEN SATURATION: 99 % | BODY MASS INDEX: 25.77 KG/M2 | HEART RATE: 107 BPM | DIASTOLIC BLOOD PRESSURE: 74 MMHG | WEIGHT: 174 LBS | HEIGHT: 69 IN

## 2022-01-01 VITALS
WEIGHT: 164 LBS | DIASTOLIC BLOOD PRESSURE: 80 MMHG | HEIGHT: 70 IN | SYSTOLIC BLOOD PRESSURE: 120 MMHG | HEART RATE: 88 BPM | TEMPERATURE: 98.2 F | OXYGEN SATURATION: 100 % | BODY MASS INDEX: 23.48 KG/M2

## 2022-01-01 VITALS
BODY MASS INDEX: 25.84 KG/M2 | HEART RATE: 107 BPM | SYSTOLIC BLOOD PRESSURE: 126 MMHG | HEIGHT: 70 IN | RESPIRATION RATE: 18 BRPM | OXYGEN SATURATION: 91 % | DIASTOLIC BLOOD PRESSURE: 83 MMHG | WEIGHT: 180.5 LBS | TEMPERATURE: 98.8 F

## 2022-01-01 VITALS
HEIGHT: 70 IN | WEIGHT: 164 LBS | HEART RATE: 97 BPM | OXYGEN SATURATION: 100 % | BODY MASS INDEX: 27.11 KG/M2 | HEIGHT: 69 IN | BODY MASS INDEX: 23.48 KG/M2 | WEIGHT: 183 LBS

## 2022-01-01 VITALS
SYSTOLIC BLOOD PRESSURE: 94 MMHG | TEMPERATURE: 98.1 F | HEART RATE: 75 BPM | BODY MASS INDEX: 27.27 KG/M2 | HEIGHT: 69 IN | DIASTOLIC BLOOD PRESSURE: 61 MMHG | WEIGHT: 184.08 LBS | OXYGEN SATURATION: 94 % | RESPIRATION RATE: 18 BRPM

## 2022-01-01 VITALS
SYSTOLIC BLOOD PRESSURE: 96 MMHG | RESPIRATION RATE: 16 BRPM | WEIGHT: 168.8 LBS | OXYGEN SATURATION: 96 % | TEMPERATURE: 97.7 F | DIASTOLIC BLOOD PRESSURE: 69 MMHG | HEART RATE: 85 BPM | BODY MASS INDEX: 23.63 KG/M2 | HEIGHT: 71 IN

## 2022-01-01 VITALS
TEMPERATURE: 96.5 F | WEIGHT: 177.8 LBS | HEART RATE: 88 BPM | DIASTOLIC BLOOD PRESSURE: 76 MMHG | OXYGEN SATURATION: 96 % | SYSTOLIC BLOOD PRESSURE: 112 MMHG | HEIGHT: 70 IN | BODY MASS INDEX: 25.45 KG/M2

## 2022-01-01 VITALS
TEMPERATURE: 98.6 F | SYSTOLIC BLOOD PRESSURE: 118 MMHG | DIASTOLIC BLOOD PRESSURE: 64 MMHG | OXYGEN SATURATION: 98 % | WEIGHT: 179 LBS | HEIGHT: 69 IN | BODY MASS INDEX: 26.51 KG/M2 | HEART RATE: 84 BPM

## 2022-01-01 VITALS
TEMPERATURE: 98 F | BODY MASS INDEX: 25.48 KG/M2 | WEIGHT: 172 LBS | DIASTOLIC BLOOD PRESSURE: 80 MMHG | SYSTOLIC BLOOD PRESSURE: 144 MMHG | HEIGHT: 69 IN | RESPIRATION RATE: 20 BRPM | OXYGEN SATURATION: 97 % | HEART RATE: 89 BPM

## 2022-01-01 VITALS
OXYGEN SATURATION: 95 % | BODY MASS INDEX: 26.96 KG/M2 | SYSTOLIC BLOOD PRESSURE: 148 MMHG | HEART RATE: 95 BPM | WEIGHT: 182 LBS | DIASTOLIC BLOOD PRESSURE: 77 MMHG | HEIGHT: 69 IN

## 2022-01-01 VITALS — HEART RATE: 72 BPM | OXYGEN SATURATION: 98 % | HEIGHT: 71 IN | WEIGHT: 168 LBS | BODY MASS INDEX: 23.52 KG/M2

## 2022-01-01 VITALS
HEART RATE: 91 BPM | BODY MASS INDEX: 25.1 KG/M2 | HEIGHT: 69 IN | OXYGEN SATURATION: 94 % | SYSTOLIC BLOOD PRESSURE: 122 MMHG | TEMPERATURE: 97.7 F | DIASTOLIC BLOOD PRESSURE: 70 MMHG

## 2022-01-01 VITALS
WEIGHT: 170 LBS | HEIGHT: 69 IN | DIASTOLIC BLOOD PRESSURE: 77 MMHG | HEART RATE: 102 BPM | BODY MASS INDEX: 25.18 KG/M2 | SYSTOLIC BLOOD PRESSURE: 116 MMHG | RESPIRATION RATE: 16 BRPM

## 2022-01-01 VITALS
TEMPERATURE: 98.2 F | SYSTOLIC BLOOD PRESSURE: 120 MMHG | DIASTOLIC BLOOD PRESSURE: 70 MMHG | HEIGHT: 69 IN | WEIGHT: 170 LBS | BODY MASS INDEX: 25.18 KG/M2

## 2022-01-01 DIAGNOSIS — M53.3 SACROILIAC JOINT DYSFUNCTION OF LEFT SIDE: ICD-10-CM

## 2022-01-01 DIAGNOSIS — Z01.812 PRE-PROCEDURAL LABORATORY EXAMINATIONS: ICD-10-CM

## 2022-01-01 DIAGNOSIS — R39.15 URINARY URGENCY: ICD-10-CM

## 2022-01-01 DIAGNOSIS — R35.0 URINE FREQUENCY: Primary | ICD-10-CM

## 2022-01-01 DIAGNOSIS — N30.01 ACUTE CYSTITIS WITH HEMATURIA: ICD-10-CM

## 2022-01-01 DIAGNOSIS — M48.54XA COLLAPSED VERTEBRA, NOT ELSEWHERE CLASSIFIED, THORACIC REGION, INITIAL ENCOUNTER FOR FRACTURE: ICD-10-CM

## 2022-01-01 DIAGNOSIS — G47.33 OSA (OBSTRUCTIVE SLEEP APNEA): ICD-10-CM

## 2022-01-01 DIAGNOSIS — R29.898 WEAKNESS OF BOTH LOWER EXTREMITIES: Primary | ICD-10-CM

## 2022-01-01 DIAGNOSIS — Z00.8 PRE-SURGICAL PSYCHOLOGICAL ASSESSMENT, ENCOUNTER FOR: ICD-10-CM

## 2022-01-01 DIAGNOSIS — N39.41 URGE INCONTINENCE: ICD-10-CM

## 2022-01-01 DIAGNOSIS — S32.050A CLOSED COMPRESSION FRACTURE OF L5 LUMBAR VERTEBRA, INITIAL ENCOUNTER: ICD-10-CM

## 2022-01-01 DIAGNOSIS — R06.2 WHEEZING: ICD-10-CM

## 2022-01-01 DIAGNOSIS — M54.42 ACUTE BILATERAL LOW BACK PAIN WITH BILATERAL SCIATICA: Primary | ICD-10-CM

## 2022-01-01 DIAGNOSIS — Z96.642 HISTORY OF HEMIARTHROPLASTY OF LEFT HIP: ICD-10-CM

## 2022-01-01 DIAGNOSIS — I48.0 PAROXYSMAL ATRIAL FIBRILLATION: Primary | ICD-10-CM

## 2022-01-01 DIAGNOSIS — Z87.81 HISTORY OF COMPRESSION FRACTURE OF SPINE: ICD-10-CM

## 2022-01-01 DIAGNOSIS — M54.41 ACUTE BILATERAL LOW BACK PAIN WITH BILATERAL SCIATICA: ICD-10-CM

## 2022-01-01 DIAGNOSIS — M47.816 ARTHRITIS OF LUMBAR SPINE: Primary | ICD-10-CM

## 2022-01-01 DIAGNOSIS — R29.898 WEAKNESS OF BOTH LOWER EXTREMITIES: ICD-10-CM

## 2022-01-01 DIAGNOSIS — Z72.0 TOBACCO ABUSE: Primary | ICD-10-CM

## 2022-01-01 DIAGNOSIS — G62.9 PERIPHERAL POLYNEUROPATHY: ICD-10-CM

## 2022-01-01 DIAGNOSIS — N39.0 URINARY TRACT INFECTION WITHOUT HEMATURIA, SITE UNSPECIFIED: ICD-10-CM

## 2022-01-01 DIAGNOSIS — N18.30 STAGE 3 CHRONIC KIDNEY DISEASE, UNSPECIFIED WHETHER STAGE 3A OR 3B CKD: ICD-10-CM

## 2022-01-01 DIAGNOSIS — Z91.81 AT HIGH RISK FOR FALLS: ICD-10-CM

## 2022-01-01 DIAGNOSIS — R60.0 EDEMA LEG: Primary | ICD-10-CM

## 2022-01-01 DIAGNOSIS — M48.062 LUMBAR STENOSIS WITH NEUROGENIC CLAUDICATION: ICD-10-CM

## 2022-01-01 DIAGNOSIS — G47.30 SLEEP APNEA, UNSPECIFIED TYPE: Primary | ICD-10-CM

## 2022-01-01 DIAGNOSIS — R00.0 TACHYCARDIA: Primary | ICD-10-CM

## 2022-01-01 DIAGNOSIS — I48.91 ATRIAL FIBRILLATION, UNSPECIFIED TYPE: Primary | ICD-10-CM

## 2022-01-01 DIAGNOSIS — R00.1 BRADYCARDIA, SINUS: ICD-10-CM

## 2022-01-01 DIAGNOSIS — R00.0 TACHYCARDIA: ICD-10-CM

## 2022-01-01 DIAGNOSIS — R26.9 GAIT DISTURBANCE: ICD-10-CM

## 2022-01-01 DIAGNOSIS — M51.27 HERNIATION OF INTERVERTEBRAL DISC BETWEEN L5 AND S1: ICD-10-CM

## 2022-01-01 DIAGNOSIS — M54.42 CHRONIC BILATERAL LOW BACK PAIN WITH LEFT-SIDED SCIATICA: ICD-10-CM

## 2022-01-01 DIAGNOSIS — M51.36 DDD (DEGENERATIVE DISC DISEASE), LUMBAR: ICD-10-CM

## 2022-01-01 DIAGNOSIS — R53.81 PHYSICAL DECONDITIONING: ICD-10-CM

## 2022-01-01 DIAGNOSIS — J40 BRONCHITIS: ICD-10-CM

## 2022-01-01 DIAGNOSIS — N30.01 ACUTE CYSTITIS WITH HEMATURIA: Primary | ICD-10-CM

## 2022-01-01 DIAGNOSIS — N40.1 BENIGN PROSTATIC HYPERPLASIA WITH URINARY FREQUENCY: ICD-10-CM

## 2022-01-01 DIAGNOSIS — F10.10 ALCOHOL ABUSE: ICD-10-CM

## 2022-01-01 DIAGNOSIS — I10 PRIMARY HYPERTENSION: ICD-10-CM

## 2022-01-01 DIAGNOSIS — R00.1 BRADYCARDIA, SINUS: Primary | ICD-10-CM

## 2022-01-01 DIAGNOSIS — M47.816 SPONDYLOSIS OF LUMBAR REGION WITHOUT MYELOPATHY OR RADICULOPATHY: ICD-10-CM

## 2022-01-01 DIAGNOSIS — R39.15 URINARY URGENCY: Primary | ICD-10-CM

## 2022-01-01 DIAGNOSIS — Z71.6 ENCOUNTER FOR SMOKING CESSATION COUNSELING: ICD-10-CM

## 2022-01-01 DIAGNOSIS — S72.002D CLOSED FRACTURE OF LEFT HIP WITH ROUTINE HEALING, SUBSEQUENT ENCOUNTER: ICD-10-CM

## 2022-01-01 DIAGNOSIS — E53.8 B12 DEFICIENCY: ICD-10-CM

## 2022-01-01 DIAGNOSIS — M54.42 ACUTE BILATERAL LOW BACK PAIN WITH BILATERAL SCIATICA: ICD-10-CM

## 2022-01-01 DIAGNOSIS — Z13.820 SPECIAL SCREENING FOR OSTEOPOROSIS: ICD-10-CM

## 2022-01-01 DIAGNOSIS — R35.0 BENIGN PROSTATIC HYPERPLASIA WITH URINARY FREQUENCY: ICD-10-CM

## 2022-01-01 DIAGNOSIS — R60.0 EDEMA, LOWER EXTREMITY: ICD-10-CM

## 2022-01-01 DIAGNOSIS — E53.8 B12 DEFICIENCY: Primary | ICD-10-CM

## 2022-01-01 DIAGNOSIS — S32.040A CLOSED COMPRESSION FRACTURE OF L4 LUMBAR VERTEBRA, INITIAL ENCOUNTER: Primary | ICD-10-CM

## 2022-01-01 DIAGNOSIS — G89.29 CHRONIC BILATERAL LOW BACK PAIN WITH LEFT-SIDED SCIATICA: ICD-10-CM

## 2022-01-01 DIAGNOSIS — M54.50 ACUTE BILATERAL LOW BACK PAIN WITHOUT SCIATICA: ICD-10-CM

## 2022-01-01 DIAGNOSIS — M54.41 ACUTE BILATERAL LOW BACK PAIN WITH BILATERAL SCIATICA: Primary | ICD-10-CM

## 2022-01-01 DIAGNOSIS — J40 BRONCHITIS: Primary | ICD-10-CM

## 2022-01-01 DIAGNOSIS — E83.118 OTHER HEMOCHROMATOSIS: ICD-10-CM

## 2022-01-01 DIAGNOSIS — M51.27 LUMBOSACRAL DISC HERNIATION: ICD-10-CM

## 2022-01-01 DIAGNOSIS — R41.3 MEMORY LOSS: ICD-10-CM

## 2022-01-01 DIAGNOSIS — Z72.0 TOBACCO ABUSE: ICD-10-CM

## 2022-01-01 DIAGNOSIS — G47.19 EXCESSIVE DAYTIME SLEEPINESS: Primary | ICD-10-CM

## 2022-01-01 DIAGNOSIS — S32.040A CLOSED COMPRESSION FRACTURE OF L4 LUMBAR VERTEBRA, INITIAL ENCOUNTER: ICD-10-CM

## 2022-01-01 LAB
ALBUMIN SERPL-MCNC: 3 G/DL (ref 3.5–5.2)
ALBUMIN/GLOB SERPL: 1.3 G/DL
ALP SERPL-CCNC: 397 U/L (ref 39–117)
ALT SERPL W P-5'-P-CCNC: 25 U/L (ref 1–41)
ANION GAP SERPL CALCULATED.3IONS-SCNC: 10 MMOL/L (ref 5–15)
ANION GAP SERPL CALCULATED.3IONS-SCNC: 8 MMOL/L (ref 5–15)
ANION GAP SERPL CALCULATED.3IONS-SCNC: 9 MMOL/L (ref 5–15)
APTT PPP: 30.3 SECONDS (ref 22–39)
ASCENDING AORTA: 3.4 CM
AST SERPL-CCNC: 55 U/L (ref 1–40)
BACTERIA SPEC AEROBE CULT: ABNORMAL
BACTERIA UR QL AUTO: ABNORMAL /HPF
BASOPHILS # BLD AUTO: 0.01 10*3/MM3 (ref 0–0.2)
BASOPHILS # BLD AUTO: 0.02 10*3/MM3 (ref 0–0.2)
BASOPHILS # BLD AUTO: 0.04 10*3/MM3 (ref 0–0.2)
BASOPHILS NFR BLD AUTO: 0.1 % (ref 0–1.5)
BASOPHILS NFR BLD AUTO: 0.2 % (ref 0–1.5)
BASOPHILS NFR BLD AUTO: 0.3 % (ref 0–1.5)
BH CV ECHO MEAS - AO MAX PG (FULL): 3.1 MMHG
BH CV ECHO MEAS - AO MAX PG: 6.1 MMHG
BH CV ECHO MEAS - AO MEAN PG (FULL): 1.7 MMHG
BH CV ECHO MEAS - AO MEAN PG: 3.1 MMHG
BH CV ECHO MEAS - AO ROOT AREA (BSA CORRECTED): 1.8
BH CV ECHO MEAS - AO ROOT AREA: 9.5 CM^2
BH CV ECHO MEAS - AO ROOT DIAM: 3.5 CM
BH CV ECHO MEAS - AO V2 MAX: 123.4 CM/SEC
BH CV ECHO MEAS - AO V2 MEAN: 80.9 CM/SEC
BH CV ECHO MEAS - AO V2 VTI: 17.1 CM
BH CV ECHO MEAS - ASC AORTA: 3.4 CM
BH CV ECHO MEAS - AVA(I,A): 2 CM^2
BH CV ECHO MEAS - AVA(I,D): 2 CM^2
BH CV ECHO MEAS - AVA(V,A): 2.1 CM^2
BH CV ECHO MEAS - AVA(V,D): 2.1 CM^2
BH CV ECHO MEAS - BSA(HAYCOCK): 1.9 M^2
BH CV ECHO MEAS - BSA: 1.9 M^2
BH CV ECHO MEAS - BZI_BMI: 23.8 KILOGRAMS/M^2
BH CV ECHO MEAS - BZI_METRIC_HEIGHT: 175.3 CM
BH CV ECHO MEAS - BZI_METRIC_WEIGHT: 73 KG
BH CV ECHO MEAS - EDV(CUBED): 68.9 ML
BH CV ECHO MEAS - EDV(MOD-SP2): 71 ML
BH CV ECHO MEAS - EDV(MOD-SP4): 79 ML
BH CV ECHO MEAS - EDV(TEICH): 74.2 ML
BH CV ECHO MEAS - EF(CUBED): 77.8 %
BH CV ECHO MEAS - EF(MOD-BP): 66 %
BH CV ECHO MEAS - EF(MOD-SP2): 66.2 %
BH CV ECHO MEAS - EF(MOD-SP4): 63.3 %
BH CV ECHO MEAS - EF(TEICH): 70.5 %
BH CV ECHO MEAS - ESV(CUBED): 15.3 ML
BH CV ECHO MEAS - ESV(MOD-SP2): 24 ML
BH CV ECHO MEAS - ESV(MOD-SP4): 29 ML
BH CV ECHO MEAS - ESV(TEICH): 21.9 ML
BH CV ECHO MEAS - FS: 39.5 %
BH CV ECHO MEAS - IVS/LVPW: 1.1
BH CV ECHO MEAS - IVSD: 1 CM
BH CV ECHO MEAS - LA DIMENSION: 4.3 CM
BH CV ECHO MEAS - LA/AO: 1.2
BH CV ECHO MEAS - LAD MAJOR: 5.7 CM
BH CV ECHO MEAS - LAT PEAK E' VEL: 11.8 CM/SEC
BH CV ECHO MEAS - LATERAL E/E' RATIO: 9.6
BH CV ECHO MEAS - LV DIASTOLIC VOL/BSA (35-75): 41.9 ML/M^2
BH CV ECHO MEAS - LV IVRT: 0.1 SEC
BH CV ECHO MEAS - LV MASS(C)D: 122 GRAMS
BH CV ECHO MEAS - LV MASS(C)DI: 64.7 GRAMS/M^2
BH CV ECHO MEAS - LV MAX PG: 3 MMHG
BH CV ECHO MEAS - LV MEAN PG: 1.5 MMHG
BH CV ECHO MEAS - LV SYSTOLIC VOL/BSA (12-30): 15.4 ML/M^2
BH CV ECHO MEAS - LV V1 MAX: 86.1 CM/SEC
BH CV ECHO MEAS - LV V1 MEAN: 54 CM/SEC
BH CV ECHO MEAS - LV V1 VTI: 11.8 CM
BH CV ECHO MEAS - LVIDD: 4.1 CM
BH CV ECHO MEAS - LVIDS: 2.5 CM
BH CV ECHO MEAS - LVLD AP2: 8.2 CM
BH CV ECHO MEAS - LVLD AP4: 7.4 CM
BH CV ECHO MEAS - LVLS AP2: 7.3 CM
BH CV ECHO MEAS - LVLS AP4: 6.9 CM
BH CV ECHO MEAS - LVOT AREA (M): 2.8 CM^2
BH CV ECHO MEAS - LVOT AREA: 3 CM^2
BH CV ECHO MEAS - LVOT DIAM: 1.9 CM
BH CV ECHO MEAS - LVPWD: 0.9 CM
BH CV ECHO MEAS - MED PEAK E' VEL: 9.5 CM/SEC
BH CV ECHO MEAS - MEDIAL E/E' RATIO: 11.9
BH CV ECHO MEAS - MV DEC TIME: 0.16 SEC
BH CV ECHO MEAS - MV E MAX VEL: 115.7 CM/SEC
BH CV ECHO MEAS - PA ACC SLOPE: 963.7 CM/SEC^2
BH CV ECHO MEAS - PA ACC TIME: 0.09 SEC
BH CV ECHO MEAS - PA MAX PG: 4.6 MMHG
BH CV ECHO MEAS - PA PR(ACCEL): 38 MMHG
BH CV ECHO MEAS - PA V2 MAX: 107.3 CM/SEC
BH CV ECHO MEAS - RAP SYSTOLE: 15 MMHG
BH CV ECHO MEAS - RVSP: 48 MMHG
BH CV ECHO MEAS - SI(AO): 86.2 ML/M^2
BH CV ECHO MEAS - SI(CUBED): 28.5 ML/M^2
BH CV ECHO MEAS - SI(LVOT): 18.5 ML/M^2
BH CV ECHO MEAS - SI(MOD-SP2): 25 ML/M^2
BH CV ECHO MEAS - SI(MOD-SP4): 26.5 ML/M^2
BH CV ECHO MEAS - SI(TEICH): 27.8 ML/M^2
BH CV ECHO MEAS - SV(AO): 162.4 ML
BH CV ECHO MEAS - SV(CUBED): 53.6 ML
BH CV ECHO MEAS - SV(LVOT): 34.9 ML
BH CV ECHO MEAS - SV(MOD-SP2): 47 ML
BH CV ECHO MEAS - SV(MOD-SP4): 50 ML
BH CV ECHO MEAS - SV(TEICH): 52.3 ML
BH CV ECHO MEAS - TAPSE (>1.6): 1.4 CM
BH CV ECHO MEAS - TR MAX PG: 33 MMHG
BH CV ECHO MEAS - TR MAX VEL: 287 CM/SEC
BH CV ECHO MEASUREMENTS AVERAGE E/E' RATIO: 10.86
BH CV VAS BP RIGHT ARM: NORMAL MMHG
BH CV XLRA - RV BASE: 2.9 CM
BH CV XLRA - RV LENGTH: 7.3 CM
BH CV XLRA - RV MID: 2.2 CM
BH CV XLRA - TDI S': 13.9 CM/SEC
BILIRUB BLD-MCNC: ABNORMAL MG/DL
BILIRUB BLD-MCNC: ABNORMAL MG/DL
BILIRUB SERPL-MCNC: 3.3 MG/DL (ref 0–1.2)
BILIRUB UR QL STRIP: ABNORMAL
BUN SERPL-MCNC: 12 MG/DL (ref 8–23)
BUN SERPL-MCNC: 13 MG/DL (ref 8–23)
BUN SERPL-MCNC: 21 MG/DL (ref 8–23)
BUN SERPL-MCNC: 24 MG/DL (ref 8–23)
BUN SERPL-MCNC: 8 MG/DL (ref 8–23)
BUN/CREAT SERPL: 12.7 (ref 7–25)
BUN/CREAT SERPL: 15 (ref 7–25)
BUN/CREAT SERPL: 17.1 (ref 7–25)
BUN/CREAT SERPL: 20.4 (ref 7–25)
BUN/CREAT SERPL: 26.7 (ref 7–25)
CALCIUM SPEC-SCNC: 8.4 MG/DL (ref 8.6–10.5)
CALCIUM SPEC-SCNC: 8.6 MG/DL (ref 8.6–10.5)
CALCIUM SPEC-SCNC: 8.6 MG/DL (ref 8.6–10.5)
CALCIUM SPEC-SCNC: 9.2 MG/DL (ref 8.6–10.5)
CALCIUM SPEC-SCNC: 9.7 MG/DL (ref 8.6–10.5)
CHLORIDE SERPL-SCNC: 100 MMOL/L (ref 98–107)
CHLORIDE SERPL-SCNC: 102 MMOL/L (ref 98–107)
CHLORIDE SERPL-SCNC: 103 MMOL/L (ref 98–107)
CLARITY UR: ABNORMAL
CLARITY, POC: ABNORMAL
CLARITY, POC: ABNORMAL
CO2 SERPL-SCNC: 22 MMOL/L (ref 22–29)
CO2 SERPL-SCNC: 26 MMOL/L (ref 22–29)
CO2 SERPL-SCNC: 26 MMOL/L (ref 22–29)
CO2 SERPL-SCNC: 27 MMOL/L (ref 22–29)
CO2 SERPL-SCNC: 27 MMOL/L (ref 22–29)
COLOR UR: ABNORMAL
CREAT BLDA-MCNC: 0.9 MG/DL (ref 0.6–1.3)
CREAT SERPL-MCNC: 0.63 MG/DL (ref 0.76–1.27)
CREAT SERPL-MCNC: 0.76 MG/DL (ref 0.76–1.27)
CREAT SERPL-MCNC: 0.8 MG/DL (ref 0.76–1.27)
CREAT SERPL-MCNC: 0.9 MG/DL (ref 0.76–1.27)
CREAT SERPL-MCNC: 1.03 MG/DL (ref 0.76–1.27)
CYTO UR: NORMAL
DEPRECATED RDW RBC AUTO: 43.7 FL (ref 37–54)
DEPRECATED RDW RBC AUTO: 53.7 FL (ref 37–54)
DEPRECATED RDW RBC AUTO: 54.6 FL (ref 37–54)
DEPRECATED RDW RBC AUTO: 58.4 FL (ref 37–54)
DEPRECATED RDW RBC AUTO: 61.8 FL (ref 37–54)
EGFRCR SERPLBLD CKD-EPI 2021: 94.6 ML/MIN/1.73
EGFRCR SERPLBLD CKD-EPI 2021: 96.1 ML/MIN/1.73
EOSINOPHIL # BLD AUTO: 0.03 10*3/MM3 (ref 0–0.4)
EOSINOPHIL # BLD AUTO: 0.08 10*3/MM3 (ref 0–0.4)
EOSINOPHIL # BLD AUTO: 0.1 10*3/MM3 (ref 0–0.4)
EOSINOPHIL NFR BLD AUTO: 0.4 % (ref 0.3–6.2)
EOSINOPHIL NFR BLD AUTO: 0.4 % (ref 0.3–6.2)
EOSINOPHIL NFR BLD AUTO: 1.3 % (ref 0.3–6.2)
ERYTHROCYTE [DISTWIDTH] IN BLOOD BY AUTOMATED COUNT: 12.4 % (ref 12.3–15.4)
ERYTHROCYTE [DISTWIDTH] IN BLOOD BY AUTOMATED COUNT: 14.4 % (ref 12.3–15.4)
ERYTHROCYTE [DISTWIDTH] IN BLOOD BY AUTOMATED COUNT: 14.5 % (ref 12.3–15.4)
ERYTHROCYTE [DISTWIDTH] IN BLOOD BY AUTOMATED COUNT: 16 % (ref 12.3–15.4)
ERYTHROCYTE [DISTWIDTH] IN BLOOD BY AUTOMATED COUNT: 16.5 % (ref 12.3–15.4)
EXPIRATION DATE: ABNORMAL
EXPIRATION DATE: ABNORMAL
GFR SERPL CREATININE-BSD FRML MDRD: 126 ML/MIN/1.73
GFR SERPL CREATININE-BSD FRML MDRD: 71 ML/MIN/1.73
GFR SERPL CREATININE-BSD FRML MDRD: 83 ML/MIN/1.73
GLOBULIN UR ELPH-MCNC: 2.3 GM/DL
GLUCOSE BLDC GLUCOMTR-MCNC: 90 MG/DL (ref 70–130)
GLUCOSE SERPL-MCNC: 110 MG/DL (ref 65–99)
GLUCOSE SERPL-MCNC: 111 MG/DL (ref 65–99)
GLUCOSE SERPL-MCNC: 129 MG/DL (ref 65–99)
GLUCOSE SERPL-MCNC: 91 MG/DL (ref 65–99)
GLUCOSE SERPL-MCNC: 91 MG/DL (ref 65–99)
GLUCOSE UR STRIP-MCNC: NEGATIVE MG/DL
HBA1C MFR BLD: 4.6 % (ref 4.8–5.6)
HCT VFR BLD AUTO: 30.9 % (ref 37.5–51)
HCT VFR BLD AUTO: 31.5 % (ref 37.5–51)
HCT VFR BLD AUTO: 32.1 % (ref 37.5–51)
HCT VFR BLD AUTO: 33.5 % (ref 37.5–51)
HCT VFR BLD AUTO: 40.1 % (ref 37.5–51)
HGB BLD-MCNC: 10.2 G/DL (ref 13–17.7)
HGB BLD-MCNC: 10.2 G/DL (ref 13–17.7)
HGB BLD-MCNC: 10.5 G/DL (ref 13–17.7)
HGB BLD-MCNC: 10.8 G/DL (ref 13–17.7)
HGB BLD-MCNC: 13 G/DL (ref 13–17.7)
HGB UR QL STRIP.AUTO: NEGATIVE
HYALINE CASTS UR QL AUTO: ABNORMAL /LPF
IMM GRANULOCYTES # BLD AUTO: 0.02 10*3/MM3 (ref 0–0.05)
IMM GRANULOCYTES # BLD AUTO: 0.03 10*3/MM3 (ref 0–0.05)
IMM GRANULOCYTES # BLD AUTO: 0.1 10*3/MM3 (ref 0–0.05)
IMM GRANULOCYTES NFR BLD AUTO: 0.3 % (ref 0–0.5)
IMM GRANULOCYTES NFR BLD AUTO: 0.4 % (ref 0–0.5)
IMM GRANULOCYTES NFR BLD AUTO: 0.5 % (ref 0–0.5)
INR PPP: 1.06 (ref 0.85–1.16)
INR PPP: 1.07 (ref 0.84–1.13)
IVRT: 97 MSEC
KETONES UR QL STRIP: NEGATIVE
KETONES UR QL: ABNORMAL
KETONES UR QL: NEGATIVE
LAB AP CASE REPORT: NORMAL
LAB AP CLINICAL INFORMATION: NORMAL
LEFT ATRIUM VOLUME INDEX: 30.3 ML/M^2
LEFT ATRIUM VOLUME: 57 ML
LEUKOCYTE EST, POC: ABNORMAL
LEUKOCYTE EST, POC: ABNORMAL
LEUKOCYTE ESTERASE UR QL STRIP.AUTO: ABNORMAL
LV EF 2D ECHO EST: 68 %
LYMPHOCYTES # BLD AUTO: 0.49 10*3/MM3 (ref 0.7–3.1)
LYMPHOCYTES # BLD AUTO: 1.13 10*3/MM3 (ref 0.7–3.1)
LYMPHOCYTES # BLD AUTO: 1.28 10*3/MM3 (ref 0.7–3.1)
LYMPHOCYTES NFR BLD AUTO: 15.5 % (ref 19.6–45.3)
LYMPHOCYTES NFR BLD AUTO: 16.8 % (ref 19.6–45.3)
LYMPHOCYTES NFR BLD AUTO: 2.7 % (ref 19.6–45.3)
Lab: ABNORMAL
Lab: ABNORMAL
MCH RBC QN AUTO: 31.4 PG (ref 26.6–33)
MCH RBC QN AUTO: 32.6 PG (ref 26.6–33)
MCH RBC QN AUTO: 32.8 PG (ref 26.6–33)
MCH RBC QN AUTO: 33.3 PG (ref 26.6–33)
MCH RBC QN AUTO: 34.1 PG (ref 26.6–33)
MCHC RBC AUTO-ENTMCNC: 31.8 G/DL (ref 31.5–35.7)
MCHC RBC AUTO-ENTMCNC: 32.2 G/DL (ref 31.5–35.7)
MCHC RBC AUTO-ENTMCNC: 32.4 G/DL (ref 31.5–35.7)
MCHC RBC AUTO-ENTMCNC: 32.4 G/DL (ref 31.5–35.7)
MCHC RBC AUTO-ENTMCNC: 34 G/DL (ref 31.5–35.7)
MCV RBC AUTO: 101.3 FL (ref 79–97)
MCV RBC AUTO: 102.6 FL (ref 79–97)
MCV RBC AUTO: 105.4 FL (ref 79–97)
MCV RBC AUTO: 97.4 FL (ref 79–97)
MCV RBC AUTO: 98.1 FL (ref 79–97)
MONOCYTES # BLD AUTO: 1.07 10*3/MM3 (ref 0.1–0.9)
MONOCYTES # BLD AUTO: 1.26 10*3/MM3 (ref 0.1–0.9)
MONOCYTES # BLD AUTO: 1.48 10*3/MM3 (ref 0.1–0.9)
MONOCYTES NFR BLD AUTO: 17.3 % (ref 5–12)
MONOCYTES NFR BLD AUTO: 19.4 % (ref 5–12)
MONOCYTES NFR BLD AUTO: 5.9 % (ref 5–12)
NEUTROPHILS NFR BLD AUTO: 16.42 10*3/MM3 (ref 1.7–7)
NEUTROPHILS NFR BLD AUTO: 4.73 10*3/MM3 (ref 1.7–7)
NEUTROPHILS NFR BLD AUTO: 4.81 10*3/MM3 (ref 1.7–7)
NEUTROPHILS NFR BLD AUTO: 61.9 % (ref 42.7–76)
NEUTROPHILS NFR BLD AUTO: 66.3 % (ref 42.7–76)
NEUTROPHILS NFR BLD AUTO: 90.3 % (ref 42.7–76)
NITRITE UR QL STRIP: POSITIVE
NITRITE UR-MCNC: NEGATIVE MG/ML
NITRITE UR-MCNC: POSITIVE MG/ML
NRBC BLD AUTO-RTO: 0 /100 WBC (ref 0–0.2)
PATH REPORT.FINAL DX SPEC: NORMAL
PATH REPORT.GROSS SPEC: NORMAL
PH UR STRIP.AUTO: 7.5 [PH] (ref 5–8)
PH UR: 5.5 [PH] (ref 5–8)
PH UR: 6 [PH] (ref 5–8)
PLATELET # BLD AUTO: 144 10*3/MM3 (ref 140–450)
PLATELET # BLD AUTO: 187 10*3/MM3 (ref 140–450)
PLATELET # BLD AUTO: 223 10*3/MM3 (ref 140–450)
PLATELET # BLD AUTO: 283 10*3/MM3 (ref 140–450)
PLATELET # BLD AUTO: 292 10*3/MM3 (ref 140–450)
PMV BLD AUTO: 10.4 FL (ref 6–12)
PMV BLD AUTO: 10.4 FL (ref 6–12)
PMV BLD AUTO: 10.5 FL (ref 6–12)
PMV BLD AUTO: 11.1 FL (ref 6–12)
PMV BLD AUTO: 9.9 FL (ref 6–12)
POTASSIUM SERPL-SCNC: 4 MMOL/L (ref 3.5–5.2)
POTASSIUM SERPL-SCNC: 4 MMOL/L (ref 3.5–5.2)
POTASSIUM SERPL-SCNC: 4.2 MMOL/L (ref 3.5–5.2)
POTASSIUM SERPL-SCNC: 4.2 MMOL/L (ref 3.5–5.2)
POTASSIUM SERPL-SCNC: 4.9 MMOL/L (ref 3.5–5.2)
PROT SERPL-MCNC: 5.3 G/DL (ref 6–8.5)
PROT UR QL STRIP: NEGATIVE
PROT UR STRIP-MCNC: ABNORMAL MG/DL
PROT UR STRIP-MCNC: ABNORMAL MG/DL
PROTHROMBIN TIME: 13.5 SECONDS (ref 11.4–14.4)
PROTHROMBIN TIME: 13.8 SECONDS (ref 11.4–14.4)
QT INTERVAL: 260 MS
QT INTERVAL: 298 MS
QT INTERVAL: 324 MS
QT INTERVAL: 338 MS
QT INTERVAL: 390 MS
QT INTERVAL: 396 MS
QT INTERVAL: 402 MS
QT INTERVAL: 414 MS
QTC INTERVAL: 408 MS
QTC INTERVAL: 436 MS
QTC INTERVAL: 443 MS
QTC INTERVAL: 447 MS
QTC INTERVAL: 457 MS
QTC INTERVAL: 468 MS
QTC INTERVAL: 473 MS
QTC INTERVAL: 475 MS
RBC # BLD AUTO: 2.99 10*6/MM3 (ref 4.14–5.8)
RBC # BLD AUTO: 3.13 10*6/MM3 (ref 4.14–5.8)
RBC # BLD AUTO: 3.15 10*6/MM3 (ref 4.14–5.8)
RBC # BLD AUTO: 3.44 10*6/MM3 (ref 4.14–5.8)
RBC # BLD AUTO: 3.96 10*6/MM3 (ref 4.14–5.8)
RBC # UR STRIP: ABNORMAL /HPF
RBC # UR STRIP: ABNORMAL /UL
RBC # UR STRIP: NEGATIVE /UL
REF LAB TEST METHOD: ABNORMAL
SARS-COV-2 RDRP RESP QL NAA+PROBE: NORMAL
SODIUM SERPL-SCNC: 133 MMOL/L (ref 136–145)
SODIUM SERPL-SCNC: 135 MMOL/L (ref 136–145)
SODIUM SERPL-SCNC: 137 MMOL/L (ref 136–145)
SODIUM SERPL-SCNC: 138 MMOL/L (ref 136–145)
SODIUM SERPL-SCNC: 139 MMOL/L (ref 136–145)
SP GR UR STRIP: 1.02 (ref 1–1.03)
SP GR UR: 1.01 (ref 1–1.03)
SP GR UR: 1.03 (ref 1–1.03)
SQUAMOUS #/AREA URNS HPF: ABNORMAL /HPF
TSH SERPL DL<=0.05 MIU/L-ACNC: 1.9 UIU/ML (ref 0.27–4.2)
UROBILINOGEN UR QL STRIP: ABNORMAL
UROBILINOGEN UR QL: ABNORMAL
UROBILINOGEN UR QL: NORMAL
WBC # UR STRIP: ABNORMAL /HPF
WBC NRBC COR # BLD: 18.2 10*3/MM3 (ref 3.4–10.8)
WBC NRBC COR # BLD: 7.12 10*3/MM3 (ref 3.4–10.8)
WBC NRBC COR # BLD: 7.27 10*3/MM3 (ref 3.4–10.8)
WBC NRBC COR # BLD: 7.63 10*3/MM3 (ref 3.4–10.8)
WBC NRBC COR # BLD: 8.01 10*3/MM3 (ref 3.4–10.8)

## 2022-01-01 PROCEDURE — A9270 NON-COVERED ITEM OR SERVICE: HCPCS | Performed by: INTERNAL MEDICINE

## 2022-01-01 PROCEDURE — A9270 NON-COVERED ITEM OR SERVICE: HCPCS | Performed by: NURSE PRACTITIONER

## 2022-01-01 PROCEDURE — 0QU03JZ SUPPLEMENT LUMBAR VERTEBRA WITH SYNTHETIC SUBSTITUTE, PERCUTANEOUS APPROACH: ICD-10-PCS | Performed by: RADIOLOGY

## 2022-01-01 PROCEDURE — 25010000002 FENTANYL CITRATE (PF) 50 MCG/ML SOLUTION: Performed by: RADIOLOGY

## 2022-01-01 PROCEDURE — 99153 MOD SED SAME PHYS/QHP EA: CPT | Performed by: INTERNAL MEDICINE

## 2022-01-01 PROCEDURE — 93000 ELECTROCARDIOGRAM COMPLETE: CPT | Performed by: INTERNAL MEDICINE

## 2022-01-01 PROCEDURE — 63710000001 MELOXICAM 7.5 MG TABLET: Performed by: INTERNAL MEDICINE

## 2022-01-01 PROCEDURE — A9270 NON-COVERED ITEM OR SERVICE: HCPCS | Performed by: PHYSICIAN ASSISTANT

## 2022-01-01 PROCEDURE — 93005 ELECTROCARDIOGRAM TRACING: CPT | Performed by: PHYSICIAN ASSISTANT

## 2022-01-01 PROCEDURE — 99152 MOD SED SAME PHYS/QHP 5/>YRS: CPT | Performed by: ANESTHESIOLOGY

## 2022-01-01 PROCEDURE — 72100 X-RAY EXAM L-S SPINE 2/3 VWS: CPT

## 2022-01-01 PROCEDURE — 63710000001 GABAPENTIN 300 MG CAPSULE: Performed by: PHYSICIAN ASSISTANT

## 2022-01-01 PROCEDURE — 99153 MOD SED SAME PHYS/QHP EA: CPT | Performed by: RADIOLOGY

## 2022-01-01 PROCEDURE — 87086 URINE CULTURE/COLONY COUNT: CPT | Performed by: PHYSICIAN ASSISTANT

## 2022-01-01 PROCEDURE — 99232 SBSQ HOSP IP/OBS MODERATE 35: CPT | Performed by: INTERNAL MEDICINE

## 2022-01-01 PROCEDURE — 63710000001 MULTIVITAMIN WITH MINERALS TABLET: Performed by: PHYSICIAN ASSISTANT

## 2022-01-01 PROCEDURE — 81003 URINALYSIS AUTO W/O SCOPE: CPT | Performed by: NURSE PRACTITIONER

## 2022-01-01 PROCEDURE — 80053 COMPREHEN METABOLIC PANEL: CPT | Performed by: INTERNAL MEDICINE

## 2022-01-01 PROCEDURE — 99214 OFFICE O/P EST MOD 30 MIN: CPT | Performed by: PHYSICIAN ASSISTANT

## 2022-01-01 PROCEDURE — 99214 OFFICE O/P EST MOD 30 MIN: CPT | Performed by: INTERNAL MEDICINE

## 2022-01-01 PROCEDURE — 25010000002 ONDANSETRON PER 1 MG: Performed by: PHYSICIAN ASSISTANT

## 2022-01-01 PROCEDURE — 63710000001 NICOTINE 14 MG/24HR PATCH 24 HOUR: Performed by: NURSE PRACTITIONER

## 2022-01-01 PROCEDURE — 63710000001 FLECAINIDE 50 MG TABLET: Performed by: NURSE PRACTITIONER

## 2022-01-01 PROCEDURE — 63710000001 CELECOXIB 200 MG CAPSULE: Performed by: PHYSICIAN ASSISTANT

## 2022-01-01 PROCEDURE — 99024 POSTOP FOLLOW-UP VISIT: CPT | Performed by: PHYSICIAN ASSISTANT

## 2022-01-01 PROCEDURE — 63710000001 DOCUSATE SODIUM 100 MG CAPSULE: Performed by: PHYSICIAN ASSISTANT

## 2022-01-01 PROCEDURE — 87086 URINE CULTURE/COLONY COUNT: CPT | Performed by: NURSE PRACTITIONER

## 2022-01-01 PROCEDURE — 88311 DECALCIFY TISSUE: CPT | Performed by: RADIOLOGY

## 2022-01-01 PROCEDURE — 63710000001 POTASSIUM CHLORIDE 20 MEQ PACK: Performed by: INTERNAL MEDICINE

## 2022-01-01 PROCEDURE — 99152 MOD SED SAME PHYS/QHP 5/>YRS: CPT | Performed by: RADIOLOGY

## 2022-01-01 PROCEDURE — 87186 SC STD MICRODIL/AGAR DIL: CPT | Performed by: NURSE PRACTITIONER

## 2022-01-01 PROCEDURE — 80048 BASIC METABOLIC PNL TOTAL CA: CPT | Performed by: INTERNAL MEDICINE

## 2022-01-01 PROCEDURE — 87077 CULTURE AEROBIC IDENTIFY: CPT | Performed by: NURSE PRACTITIONER

## 2022-01-01 PROCEDURE — 63710000001 PANTOPRAZOLE 40 MG TABLET DELAYED-RELEASE: Performed by: INTERNAL MEDICINE

## 2022-01-01 PROCEDURE — 99239 HOSP IP/OBS DSCHRG MGMT >30: CPT | Performed by: NURSE PRACTITIONER

## 2022-01-01 PROCEDURE — 80048 BASIC METABOLIC PNL TOTAL CA: CPT

## 2022-01-01 PROCEDURE — 97110 THERAPEUTIC EXERCISES: CPT

## 2022-01-01 PROCEDURE — 85025 COMPLETE CBC W/AUTO DIFF WBC: CPT | Performed by: PHYSICIAN ASSISTANT

## 2022-01-01 PROCEDURE — 63710000001 DOCUSATE SODIUM 100 MG CAPSULE: Performed by: INTERNAL MEDICINE

## 2022-01-01 PROCEDURE — 99214 OFFICE O/P EST MOD 30 MIN: CPT | Performed by: UROLOGY

## 2022-01-01 PROCEDURE — C1898 LEAD, PMKR, OTHER THAN TRANS: HCPCS | Performed by: INTERNAL MEDICINE

## 2022-01-01 PROCEDURE — 87635 SARS-COV-2 COVID-19 AMP PRB: CPT | Performed by: RADIOLOGY

## 2022-01-01 PROCEDURE — 85610 PROTHROMBIN TIME: CPT

## 2022-01-01 PROCEDURE — 85730 THROMBOPLASTIN TIME PARTIAL: CPT

## 2022-01-01 PROCEDURE — 93010 ELECTROCARDIOGRAM REPORT: CPT | Performed by: INTERNAL MEDICINE

## 2022-01-01 PROCEDURE — 25010000002 SODIUM CHLORIDE 0.9 % WITH KCL 20 MEQ 20-0.9 MEQ/L-% SOLUTION: Performed by: PHYSICIAN ASSISTANT

## 2022-01-01 PROCEDURE — 99152 MOD SED SAME PHYS/QHP 5/>YRS: CPT | Performed by: INTERNAL MEDICINE

## 2022-01-01 PROCEDURE — 88307 TISSUE EXAM BY PATHOLOGIST: CPT | Performed by: RADIOLOGY

## 2022-01-01 PROCEDURE — 85027 COMPLETE CBC AUTOMATED: CPT

## 2022-01-01 PROCEDURE — 71046 X-RAY EXAM CHEST 2 VIEWS: CPT

## 2022-01-01 PROCEDURE — 93005 ELECTROCARDIOGRAM TRACING: CPT | Performed by: NURSE PRACTITIONER

## 2022-01-01 PROCEDURE — 63710000001 NICOTINE 21 MG/24HR PATCH 24 HOUR: Performed by: PHYSICIAN ASSISTANT

## 2022-01-01 PROCEDURE — 0QS03ZZ REPOSITION LUMBAR VERTEBRA, PERCUTANEOUS APPROACH: ICD-10-PCS | Performed by: RADIOLOGY

## 2022-01-01 PROCEDURE — 36415 COLL VENOUS BLD VENIPUNCTURE: CPT

## 2022-01-01 PROCEDURE — 93010 ELECTROCARDIOGRAM REPORT: CPT | Performed by: STUDENT IN AN ORGANIZED HEALTH CARE EDUCATION/TRAINING PROGRAM

## 2022-01-01 PROCEDURE — 52000 CYSTOURETHROSCOPY: CPT | Performed by: UROLOGY

## 2022-01-01 PROCEDURE — 99213 OFFICE O/P EST LOW 20 MIN: CPT | Performed by: RADIOLOGY

## 2022-01-01 PROCEDURE — 33208 INSRT HEART PM ATRIAL & VENT: CPT | Performed by: INTERNAL MEDICINE

## 2022-01-01 PROCEDURE — 99211 OFF/OP EST MAY X REQ PHY/QHP: CPT | Performed by: STUDENT IN AN ORGANIZED HEALTH CARE EDUCATION/TRAINING PROGRAM

## 2022-01-01 PROCEDURE — 63710000001 SOTALOL 80 MG TABLET: Performed by: INTERNAL MEDICINE

## 2022-01-01 PROCEDURE — 25010000002 MIDAZOLAM PER 1 MG: Performed by: RADIOLOGY

## 2022-01-01 PROCEDURE — 94664 DEMO&/EVAL PT USE INHALER: CPT

## 2022-01-01 PROCEDURE — 25010000002 FUROSEMIDE PER 20 MG: Performed by: INTERNAL MEDICINE

## 2022-01-01 PROCEDURE — 72158 MRI LUMBAR SPINE W/O & W/DYE: CPT

## 2022-01-01 PROCEDURE — 63710000001 ASPIRIN 81 MG TABLET DELAYED-RELEASE: Performed by: INTERNAL MEDICINE

## 2022-01-01 PROCEDURE — 94799 UNLISTED PULMONARY SVC/PX: CPT

## 2022-01-01 PROCEDURE — 99214 OFFICE O/P EST MOD 30 MIN: CPT | Performed by: FAMILY MEDICINE

## 2022-01-01 PROCEDURE — 93294 REM INTERROG EVL PM/LDLS PM: CPT | Performed by: INTERNAL MEDICINE

## 2022-01-01 PROCEDURE — 99213 OFFICE O/P EST LOW 20 MIN: CPT | Performed by: PHYSICIAN ASSISTANT

## 2022-01-01 PROCEDURE — 93005 ELECTROCARDIOGRAM TRACING: CPT | Performed by: INTERNAL MEDICINE

## 2022-01-01 PROCEDURE — 64484 NJX AA&/STRD TFRM EPI L/S EA: CPT | Performed by: ANESTHESIOLOGY

## 2022-01-01 PROCEDURE — C1713 ANCHOR/SCREW BN/BN,TIS/BN: HCPCS | Performed by: RADIOLOGY

## 2022-01-01 PROCEDURE — C1892 INTRO/SHEATH,FIXED,PEEL-AWAY: HCPCS | Performed by: INTERNAL MEDICINE

## 2022-01-01 PROCEDURE — 22514 PERQ VERTEBRAL AUGMENTATION: CPT | Performed by: RADIOLOGY

## 2022-01-01 PROCEDURE — 63710000001 FOLIC ACID 1 MG TABLET: Performed by: PHYSICIAN ASSISTANT

## 2022-01-01 PROCEDURE — 87077 CULTURE AEROBIC IDENTIFY: CPT | Performed by: PHYSICIAN ASSISTANT

## 2022-01-01 PROCEDURE — 85025 COMPLETE CBC W/AUTO DIFF WBC: CPT | Performed by: INTERNAL MEDICINE

## 2022-01-01 PROCEDURE — 97530 THERAPEUTIC ACTIVITIES: CPT

## 2022-01-01 PROCEDURE — 99204 OFFICE O/P NEW MOD 45 MIN: CPT | Performed by: INTERNAL MEDICINE

## 2022-01-01 PROCEDURE — 99204 OFFICE O/P NEW MOD 45 MIN: CPT | Performed by: UROLOGY

## 2022-01-01 PROCEDURE — 87186 SC STD MICRODIL/AGAR DIL: CPT | Performed by: PHYSICIAN ASSISTANT

## 2022-01-01 PROCEDURE — 63710000001 TAMSULOSIN 0.4 MG CAPSULE: Performed by: PHYSICIAN ASSISTANT

## 2022-01-01 PROCEDURE — 72120 X-RAY BEND ONLY L-S SPINE: CPT

## 2022-01-01 PROCEDURE — 63710000001 PANTOPRAZOLE 40 MG TABLET DELAYED-RELEASE: Performed by: PHYSICIAN ASSISTANT

## 2022-01-01 PROCEDURE — 93000 ELECTROCARDIOGRAM COMPLETE: CPT | Performed by: PHYSICIAN ASSISTANT

## 2022-01-01 PROCEDURE — 85025 COMPLETE CBC W/AUTO DIFF WBC: CPT | Performed by: RADIOLOGY

## 2022-01-01 PROCEDURE — 93306 TTE W/DOPPLER COMPLETE: CPT | Performed by: INTERNAL MEDICINE

## 2022-01-01 PROCEDURE — G0378 HOSPITAL OBSERVATION PER HR: HCPCS

## 2022-01-01 PROCEDURE — A9577 INJ MULTIHANCE: HCPCS | Performed by: PHYSICIAN ASSISTANT

## 2022-01-01 PROCEDURE — C1785 PMKR, DUAL, RATE-RESP: HCPCS | Performed by: INTERNAL MEDICINE

## 2022-01-01 PROCEDURE — 0Q903ZX DRAINAGE OF LUMBAR VERTEBRA, PERCUTANEOUS APPROACH, DIAGNOSTIC: ICD-10-PCS | Performed by: RADIOLOGY

## 2022-01-01 PROCEDURE — 81001 URINALYSIS AUTO W/SCOPE: CPT | Performed by: PHYSICIAN ASSISTANT

## 2022-01-01 PROCEDURE — 99213 OFFICE O/P EST LOW 20 MIN: CPT | Performed by: NURSE PRACTITIONER

## 2022-01-01 PROCEDURE — 83036 HEMOGLOBIN GLYCOSYLATED A1C: CPT | Performed by: PHYSICIAN ASSISTANT

## 2022-01-01 PROCEDURE — 63710000001 BISACODYL 10 MG SUPPOSITORY: Performed by: PHYSICIAN ASSISTANT

## 2022-01-01 PROCEDURE — 25010000002 FUROSEMIDE PER 20 MG: Performed by: NURSE PRACTITIONER

## 2022-01-01 PROCEDURE — 82565 ASSAY OF CREATININE: CPT

## 2022-01-01 PROCEDURE — 97162 PT EVAL MOD COMPLEX 30 MIN: CPT

## 2022-01-01 PROCEDURE — 0 LIDOCAINE 1 % SOLUTION: Performed by: INTERNAL MEDICINE

## 2022-01-01 PROCEDURE — 63710000001 DULOXETINE 30 MG CAPSULE DELAYED-RELEASE PARTICLES: Performed by: PHYSICIAN ASSISTANT

## 2022-01-01 PROCEDURE — 63710000001 SULFAMETHOXAZOLE-TRIMETHOPRIM 800-160 MG TABLET: Performed by: PHYSICIAN ASSISTANT

## 2022-01-01 PROCEDURE — 25010000002 ONDANSETRON PER 1 MG: Performed by: INTERNAL MEDICINE

## 2022-01-01 PROCEDURE — 63710000001 ACETAMINOPHEN 325 MG TABLET: Performed by: INTERNAL MEDICINE

## 2022-01-01 PROCEDURE — 99406 BEHAV CHNG SMOKING 3-10 MIN: CPT | Performed by: ANESTHESIOLOGY

## 2022-01-01 PROCEDURE — 99024 POSTOP FOLLOW-UP VISIT: CPT | Performed by: INTERNAL MEDICINE

## 2022-01-01 PROCEDURE — 81003 URINALYSIS AUTO W/O SCOPE: CPT | Performed by: PHYSICIAN ASSISTANT

## 2022-01-01 PROCEDURE — 51798 US URINE CAPACITY MEASURE: CPT | Performed by: UROLOGY

## 2022-01-01 PROCEDURE — 25010000002 CEFAZOLIN IN DEXTROSE 2-4 GM/100ML-% SOLUTION: Performed by: PHYSICIAN ASSISTANT

## 2022-01-01 PROCEDURE — 64483 NJX AA&/STRD TFRM EPI L/S 1: CPT | Performed by: ANESTHESIOLOGY

## 2022-01-01 PROCEDURE — 80048 BASIC METABOLIC PNL TOTAL CA: CPT | Performed by: RADIOLOGY

## 2022-01-01 PROCEDURE — 93306 TTE W/DOPPLER COMPLETE: CPT

## 2022-01-01 PROCEDURE — 93296 REM INTERROG EVL PM/IDS: CPT | Performed by: INTERNAL MEDICINE

## 2022-01-01 PROCEDURE — 84443 ASSAY THYROID STIM HORMONE: CPT | Performed by: NURSE PRACTITIONER

## 2022-01-01 PROCEDURE — 97165 OT EVAL LOW COMPLEX 30 MIN: CPT | Performed by: OCCUPATIONAL THERAPIST

## 2022-01-01 PROCEDURE — 82962 GLUCOSE BLOOD TEST: CPT

## 2022-01-01 PROCEDURE — 63710000001 FLECAINIDE 50 MG TABLET: Performed by: INTERNAL MEDICINE

## 2022-01-01 PROCEDURE — 99204 OFFICE O/P NEW MOD 45 MIN: CPT | Performed by: ANESTHESIOLOGY

## 2022-01-01 PROCEDURE — 0 GADOBENATE DIMEGLUMINE 529 MG/ML SOLUTION: Performed by: PHYSICIAN ASSISTANT

## 2022-01-01 PROCEDURE — 63710000001 METOPROLOL TARTRATE 25 MG TABLET: Performed by: NURSE PRACTITIONER

## 2022-01-01 PROCEDURE — 25010000002 FENTANYL CITRATE (PF) 50 MCG/ML SOLUTION: Performed by: INTERNAL MEDICINE

## 2022-01-01 PROCEDURE — 93005 ELECTROCARDIOGRAM TRACING: CPT | Performed by: RADIOLOGY

## 2022-01-01 PROCEDURE — 63710000001 TAMSULOSIN 0.4 MG CAPSULE: Performed by: INTERNAL MEDICINE

## 2022-01-01 PROCEDURE — 80048 BASIC METABOLIC PNL TOTAL CA: CPT | Performed by: PHYSICIAN ASSISTANT

## 2022-01-01 PROCEDURE — 97116 GAIT TRAINING THERAPY: CPT

## 2022-01-01 PROCEDURE — 63710000001 ASPIRIN 81 MG TABLET DELAYED-RELEASE: Performed by: NURSE PRACTITIONER

## 2022-01-01 DEVICE — LD PM TENDRIL STS 6F58CM 2088TC58: Type: IMPLANTABLE DEVICE | Status: FUNCTIONAL

## 2022-01-01 DEVICE — GEN PM ASSURITY MRI DR RF PM2272: Type: IMPLANTABLE DEVICE | Status: FUNCTIONAL

## 2022-01-01 DEVICE — LD PM TENDRIL STS 6F52CM 2088TC52: Type: IMPLANTABLE DEVICE | Status: FUNCTIONAL

## 2022-01-01 DEVICE — CMT BONE VSTEADY RO: Type: IMPLANTABLE DEVICE | Status: FUNCTIONAL

## 2022-01-01 RX ORDER — CELECOXIB 200 MG/1
CAPSULE ORAL
Qty: 60 CAPSULE | Refills: 2 | Status: SHIPPED | OUTPATIENT
Start: 2022-01-01 | End: 2022-01-01

## 2022-01-01 RX ORDER — TAMSULOSIN HYDROCHLORIDE 0.4 MG/1
0.4 CAPSULE ORAL NIGHTLY
Status: DISCONTINUED | OUTPATIENT
Start: 2022-01-01 | End: 2022-01-01 | Stop reason: HOSPADM

## 2022-01-01 RX ORDER — SODIUM CHLORIDE AND POTASSIUM CHLORIDE 150; 900 MG/100ML; MG/100ML
100 INJECTION, SOLUTION INTRAVENOUS CONTINUOUS
Status: DISCONTINUED | OUTPATIENT
Start: 2022-01-01 | End: 2022-01-01

## 2022-01-01 RX ORDER — LANOLIN ALCOHOL/MO/W.PET/CERES
1000 CREAM (GRAM) TOPICAL DAILY
Qty: 30 TABLET | Refills: 1 | Status: SHIPPED | OUTPATIENT
Start: 2022-01-01

## 2022-01-01 RX ORDER — NICOTINE 21 MG/24HR
1 PATCH, TRANSDERMAL 24 HOURS TRANSDERMAL
Qty: 28 EACH | Refills: 0 | Status: SHIPPED | OUTPATIENT
Start: 2022-01-01 | End: 2022-01-01

## 2022-01-01 RX ORDER — NITROFURANTOIN MACROCRYSTALS 100 MG/1
100 CAPSULE ORAL 4 TIMES DAILY
COMMUNITY
End: 2022-01-01

## 2022-01-01 RX ORDER — ASPIRIN 325 MG
325 TABLET, DELAYED RELEASE (ENTERIC COATED) ORAL DAILY
Status: ON HOLD
Start: 2022-01-01 | End: 2022-01-01

## 2022-01-01 RX ORDER — NITROGLYCERIN 0.4 MG/1
0.4 TABLET SUBLINGUAL
Status: DISCONTINUED | OUTPATIENT
Start: 2022-01-01 | End: 2022-01-01 | Stop reason: HOSPADM

## 2022-01-01 RX ORDER — LIDOCAINE HYDROCHLORIDE 10 MG/ML
INJECTION, SOLUTION EPIDURAL; INFILTRATION; INTRACAUDAL; PERINEURAL AS NEEDED
Status: DISCONTINUED | OUTPATIENT
Start: 2022-01-01 | End: 2022-01-01 | Stop reason: HOSPADM

## 2022-01-01 RX ORDER — MELOXICAM 15 MG/1
15 TABLET ORAL DAILY
Qty: 90 TABLET | Refills: 3 | Status: SHIPPED | OUTPATIENT
Start: 2022-01-01

## 2022-01-01 RX ORDER — METHYLPREDNISOLONE 4 MG/1
TABLET ORAL
Qty: 21 TABLET | Refills: 0 | Status: SHIPPED | OUTPATIENT
Start: 2022-01-01 | End: 2022-01-01

## 2022-01-01 RX ORDER — ONDANSETRON 2 MG/ML
INJECTION INTRAMUSCULAR; INTRAVENOUS AS NEEDED
Status: DISCONTINUED | OUTPATIENT
Start: 2022-01-01 | End: 2022-01-01 | Stop reason: HOSPADM

## 2022-01-01 RX ORDER — DOCUSATE SODIUM 100 MG/1
100 CAPSULE, LIQUID FILLED ORAL 2 TIMES DAILY
Status: DISCONTINUED | OUTPATIENT
Start: 2022-01-01 | End: 2022-01-01 | Stop reason: HOSPADM

## 2022-01-01 RX ORDER — HYDROCODONE BITARTRATE AND ACETAMINOPHEN 7.5; 325 MG/1; MG/1
1 TABLET ORAL EVERY 6 HOURS PRN
Status: DISCONTINUED | OUTPATIENT
Start: 2022-01-01 | End: 2022-01-01 | Stop reason: HOSPADM

## 2022-01-01 RX ORDER — NICOTINE 21 MG/24HR
1 PATCH, TRANSDERMAL 24 HOURS TRANSDERMAL
Status: DISCONTINUED | OUTPATIENT
Start: 2022-01-01 | End: 2022-01-01 | Stop reason: HOSPADM

## 2022-01-01 RX ORDER — CEFAZOLIN SODIUM 2 G/100ML
2 INJECTION, SOLUTION INTRAVENOUS ONCE
Status: COMPLETED | OUTPATIENT
Start: 2022-01-01 | End: 2022-01-01

## 2022-01-01 RX ORDER — NICOTINE 21 MG/24HR
1 PATCH, TRANSDERMAL 24 HOURS TRANSDERMAL EVERY 24 HOURS
Qty: 56 PATCH | Refills: 0 | Status: SHIPPED | OUTPATIENT
Start: 2022-01-01 | End: 2022-01-01

## 2022-01-01 RX ORDER — IPRATROPIUM BROMIDE AND ALBUTEROL SULFATE 2.5; .5 MG/3ML; MG/3ML
3 SOLUTION RESPIRATORY (INHALATION)
Status: DISCONTINUED | OUTPATIENT
Start: 2022-01-01 | End: 2022-01-01 | Stop reason: HOSPADM

## 2022-01-01 RX ORDER — ASPIRIN 81 MG/1
81 TABLET ORAL DAILY
Qty: 30 TABLET | Refills: 6 | Status: SHIPPED | OUTPATIENT
Start: 2022-01-01 | End: 2022-01-01

## 2022-01-01 RX ORDER — ACETAMINOPHEN 650 MG/1
650 SUPPOSITORY RECTAL EVERY 4 HOURS PRN
Status: DISCONTINUED | OUTPATIENT
Start: 2022-01-01 | End: 2022-01-01 | Stop reason: HOSPADM

## 2022-01-01 RX ORDER — DOCUSATE SODIUM 100 MG/1
100 CAPSULE, LIQUID FILLED ORAL 2 TIMES DAILY
COMMUNITY

## 2022-01-01 RX ORDER — NITROGLYCERIN 0.4 MG/1
0.4 TABLET SUBLINGUAL
Status: CANCELLED | OUTPATIENT
Start: 2022-01-01

## 2022-01-01 RX ORDER — FLECAINIDE ACETATE 50 MG/1
100 TABLET ORAL EVERY 12 HOURS SCHEDULED
Status: DISCONTINUED | OUTPATIENT
Start: 2022-01-01 | End: 2022-01-01 | Stop reason: HOSPADM

## 2022-01-01 RX ORDER — MELOXICAM 15 MG/1
15 TABLET ORAL DAILY
COMMUNITY
End: 2022-01-01

## 2022-01-01 RX ORDER — MIDAZOLAM HYDROCHLORIDE 1 MG/ML
INJECTION INTRAMUSCULAR; INTRAVENOUS AS NEEDED
Status: DISCONTINUED | OUTPATIENT
Start: 2022-01-01 | End: 2022-01-01 | Stop reason: HOSPADM

## 2022-01-01 RX ORDER — FUROSEMIDE 10 MG/ML
40 INJECTION INTRAMUSCULAR; INTRAVENOUS ONCE
Status: COMPLETED | OUTPATIENT
Start: 2022-01-01 | End: 2022-01-01

## 2022-01-01 RX ORDER — ASPIRIN 81 MG/1
81 TABLET ORAL DAILY
Status: DISCONTINUED | OUTPATIENT
Start: 2022-01-01 | End: 2022-01-01 | Stop reason: HOSPADM

## 2022-01-01 RX ORDER — METOPROLOL TARTRATE 50 MG/1
50 TABLET, FILM COATED ORAL EVERY 12 HOURS SCHEDULED
Status: DISCONTINUED | OUTPATIENT
Start: 2022-01-01 | End: 2022-01-01

## 2022-01-01 RX ORDER — FOLIC ACID 1 MG/1
1000 TABLET ORAL DAILY
Status: DISCONTINUED | OUTPATIENT
Start: 2022-01-01 | End: 2022-01-01 | Stop reason: HOSPADM

## 2022-01-01 RX ORDER — ALBUTEROL SULFATE 90 UG/1
2 AEROSOL, METERED RESPIRATORY (INHALATION) EVERY 4 HOURS PRN
Qty: 18 G | Refills: 0 | Status: SHIPPED | OUTPATIENT
Start: 2022-01-01

## 2022-01-01 RX ORDER — GABAPENTIN 300 MG/1
300 CAPSULE ORAL 3 TIMES DAILY
Status: DISCONTINUED | OUTPATIENT
Start: 2022-01-01 | End: 2022-01-01 | Stop reason: HOSPADM

## 2022-01-01 RX ORDER — SOTALOL HYDROCHLORIDE 80 MG/1
80 TABLET ORAL EVERY 12 HOURS SCHEDULED
Qty: 60 TABLET | Refills: 5 | Status: SHIPPED | OUTPATIENT
Start: 2022-01-01

## 2022-01-01 RX ORDER — IPRATROPIUM BROMIDE AND ALBUTEROL SULFATE 2.5; .5 MG/3ML; MG/3ML
3 SOLUTION RESPIRATORY (INHALATION) ONCE
Status: DISCONTINUED | OUTPATIENT
Start: 2022-01-01 | End: 2022-01-01 | Stop reason: HOSPADM

## 2022-01-01 RX ORDER — SODIUM CHLORIDE 0.9 % (FLUSH) 0.9 %
10 SYRINGE (ML) INJECTION AS NEEDED
Status: DISCONTINUED | OUTPATIENT
Start: 2022-01-01 | End: 2022-01-01 | Stop reason: HOSPADM

## 2022-01-01 RX ORDER — DULOXETIN HYDROCHLORIDE 30 MG/1
CAPSULE, DELAYED RELEASE ORAL
Qty: 90 CAPSULE | Refills: 1 | Status: SHIPPED | OUTPATIENT
Start: 2022-01-01

## 2022-01-01 RX ORDER — LIDOCAINE 50 MG/G
1 PATCH TOPICAL EVERY 24 HOURS
Qty: 30 EACH | Refills: 0 | Status: SHIPPED | OUTPATIENT
Start: 2022-01-01 | End: 2022-01-01

## 2022-01-01 RX ORDER — NITROFURANTOIN 25; 75 MG/1; MG/1
100 CAPSULE ORAL EVERY 12 HOURS SCHEDULED
Qty: 7 CAPSULE | Refills: 0 | Status: SHIPPED | OUTPATIENT
Start: 2022-01-01 | End: 2022-01-01

## 2022-01-01 RX ORDER — METOPROLOL SUCCINATE 25 MG/1
12.5 TABLET, EXTENDED RELEASE ORAL
COMMUNITY
Start: 2022-01-01 | End: 2022-01-01

## 2022-01-01 RX ORDER — FLECAINIDE ACETATE 100 MG/1
100 TABLET ORAL 2 TIMES DAILY
Qty: 60 TABLET | Refills: 11 | Status: SHIPPED | OUTPATIENT
Start: 2022-01-01 | End: 2022-01-01

## 2022-01-01 RX ORDER — POLYETHYLENE GLYCOL 3350 17 G/17G
17 POWDER, FOR SOLUTION ORAL DAILY PRN
Status: DISCONTINUED | OUTPATIENT
Start: 2022-01-01 | End: 2022-01-01 | Stop reason: HOSPADM

## 2022-01-01 RX ORDER — ACETAMINOPHEN 325 MG/1
650 TABLET ORAL EVERY 4 HOURS PRN
Status: DISCONTINUED | OUTPATIENT
Start: 2022-01-01 | End: 2022-01-01 | Stop reason: HOSPADM

## 2022-01-01 RX ORDER — ONDANSETRON 2 MG/ML
4 INJECTION INTRAMUSCULAR; INTRAVENOUS EVERY 6 HOURS PRN
Status: DISCONTINUED | OUTPATIENT
Start: 2022-01-01 | End: 2022-01-01 | Stop reason: HOSPADM

## 2022-01-01 RX ORDER — POTASSIUM CHLORIDE 1500 MG/1
TABLET, FILM COATED, EXTENDED RELEASE ORAL
Qty: 5 TABLET | OUTPATIENT
Start: 2022-01-01

## 2022-01-01 RX ORDER — MELOXICAM 7.5 MG/1
15 TABLET ORAL DAILY
Status: DISCONTINUED | OUTPATIENT
Start: 2022-01-01 | End: 2022-01-01 | Stop reason: HOSPADM

## 2022-01-01 RX ORDER — FLECAINIDE ACETATE 50 MG/1
100 TABLET ORAL EVERY 12 HOURS SCHEDULED
Status: DISCONTINUED | OUTPATIENT
Start: 2022-01-01 | End: 2022-01-01

## 2022-01-01 RX ORDER — TAMSULOSIN HYDROCHLORIDE 0.4 MG/1
1 CAPSULE ORAL
Qty: 90 CAPSULE | Refills: 0 | Status: SHIPPED | OUTPATIENT
Start: 2022-01-01

## 2022-01-01 RX ORDER — PANTOPRAZOLE SODIUM 40 MG/1
40 TABLET, DELAYED RELEASE ORAL 2 TIMES DAILY
Status: DISCONTINUED | OUTPATIENT
Start: 2022-01-01 | End: 2022-01-01 | Stop reason: HOSPADM

## 2022-01-01 RX ORDER — SODIUM CHLORIDE 0.9 % (FLUSH) 0.9 %
3 SYRINGE (ML) INJECTION EVERY 12 HOURS SCHEDULED
Status: DISCONTINUED | OUTPATIENT
Start: 2022-01-01 | End: 2022-01-01 | Stop reason: HOSPADM

## 2022-01-01 RX ORDER — BUPIVACAINE HYDROCHLORIDE 5 MG/ML
INJECTION, SOLUTION PERINEURAL AS NEEDED
Status: DISCONTINUED | OUTPATIENT
Start: 2022-01-01 | End: 2022-01-01 | Stop reason: HOSPADM

## 2022-01-01 RX ORDER — FENTANYL CITRATE 50 UG/ML
INJECTION, SOLUTION INTRAMUSCULAR; INTRAVENOUS AS NEEDED
Status: DISCONTINUED | OUTPATIENT
Start: 2022-01-01 | End: 2022-01-01 | Stop reason: HOSPADM

## 2022-01-01 RX ORDER — FOLIC ACID 1 MG/1
1 TABLET ORAL DAILY
Qty: 90 TABLET | Refills: 0 | Status: SHIPPED | OUTPATIENT
Start: 2022-01-01

## 2022-01-01 RX ORDER — UBIDECARENONE 75 MG
200 CAPSULE ORAL DAILY
Qty: 180 TABLET | Refills: 0 | Status: SHIPPED | OUTPATIENT
Start: 2022-01-01 | End: 2022-01-01

## 2022-01-01 RX ORDER — SODIUM CHLORIDE 0.9 % (FLUSH) 0.9 %
3 SYRINGE (ML) INJECTION EVERY 12 HOURS SCHEDULED
Status: CANCELLED | OUTPATIENT
Start: 2022-01-01

## 2022-01-01 RX ORDER — DULOXETIN HYDROCHLORIDE 30 MG/1
30 CAPSULE, DELAYED RELEASE ORAL DAILY
Status: DISCONTINUED | OUTPATIENT
Start: 2022-01-01 | End: 2022-01-01 | Stop reason: HOSPADM

## 2022-01-01 RX ORDER — CYCLOBENZAPRINE HCL 10 MG
10 TABLET ORAL 3 TIMES DAILY PRN
Status: DISCONTINUED | OUTPATIENT
Start: 2022-01-01 | End: 2022-01-01 | Stop reason: HOSPADM

## 2022-01-01 RX ORDER — NITROFURANTOIN 25; 75 MG/1; MG/1
100 CAPSULE ORAL EVERY 12 HOURS SCHEDULED
Status: DISCONTINUED | OUTPATIENT
Start: 2022-01-01 | End: 2022-01-01 | Stop reason: HOSPADM

## 2022-01-01 RX ORDER — OXYCODONE HYDROCHLORIDE 10 MG/1
10 TABLET ORAL EVERY 6 HOURS PRN
Start: 2022-01-01 | End: 2022-01-01

## 2022-01-01 RX ORDER — SOTALOL HYDROCHLORIDE 80 MG/1
80 TABLET ORAL EVERY 12 HOURS SCHEDULED
Status: DISCONTINUED | OUTPATIENT
Start: 2022-01-01 | End: 2022-01-01 | Stop reason: HOSPADM

## 2022-01-01 RX ORDER — SODIUM CHLORIDE 9 MG/ML
INJECTION, SOLUTION INTRAVENOUS CONTINUOUS PRN
Status: COMPLETED | OUTPATIENT
Start: 2022-01-01 | End: 2022-01-01

## 2022-01-01 RX ORDER — ONDANSETRON 2 MG/ML
4 INJECTION INTRAMUSCULAR; INTRAVENOUS EVERY 6 HOURS PRN
Status: DISCONTINUED | OUTPATIENT
Start: 2022-01-01 | End: 2022-01-01

## 2022-01-01 RX ORDER — DIPHENOXYLATE HYDROCHLORIDE AND ATROPINE SULFATE 2.5; .025 MG/1; MG/1
1 TABLET ORAL DAILY
Qty: 30 TABLET | Status: ON HOLD
Start: 2022-01-01 | End: 2022-01-01

## 2022-01-01 RX ORDER — BISACODYL 10 MG
10 SUPPOSITORY, RECTAL RECTAL DAILY
Status: DISCONTINUED | OUTPATIENT
Start: 2022-01-01 | End: 2022-01-01 | Stop reason: HOSPADM

## 2022-01-01 RX ORDER — NITROFURANTOIN 25; 75 MG/1; MG/1
100 CAPSULE ORAL 2 TIMES DAILY
Qty: 14 CAPSULE | Refills: 0 | Status: SHIPPED | OUTPATIENT
Start: 2022-01-01 | End: 2022-07-08

## 2022-01-01 RX ORDER — AMOXICILLIN AND CLAVULANATE POTASSIUM 875; 125 MG/1; MG/1
1 TABLET, FILM COATED ORAL 2 TIMES DAILY
Qty: 14 TABLET | Refills: 0 | Status: SHIPPED | OUTPATIENT
Start: 2022-01-01 | End: 2022-01-01

## 2022-01-01 RX ORDER — LANOLIN ALCOHOL/MO/W.PET/CERES
1000 CREAM (GRAM) TOPICAL DAILY
COMMUNITY
End: 2022-01-01 | Stop reason: SDUPTHER

## 2022-01-01 RX ORDER — HYDROCODONE BITARTRATE AND ACETAMINOPHEN 7.5; 325 MG/1; MG/1
1 TABLET ORAL EVERY 6 HOURS PRN
Qty: 30 TABLET | Refills: 0 | Status: SHIPPED | OUTPATIENT
Start: 2022-01-01 | End: 2022-01-01

## 2022-01-01 RX ORDER — SODIUM CHLORIDE 0.9 % (FLUSH) 0.9 %
10 SYRINGE (ML) INJECTION AS NEEDED
Status: CANCELLED | OUTPATIENT
Start: 2022-01-01

## 2022-01-01 RX ORDER — FUROSEMIDE 10 MG/ML
40 INJECTION INTRAMUSCULAR; INTRAVENOUS ONCE
Status: DISCONTINUED | OUTPATIENT
Start: 2022-01-01 | End: 2022-01-01

## 2022-01-01 RX ORDER — DULOXETIN HYDROCHLORIDE 30 MG/1
CAPSULE, DELAYED RELEASE ORAL
Qty: 60 CAPSULE | Refills: 0 | Status: SHIPPED | OUTPATIENT
Start: 2022-01-01 | End: 2022-01-01

## 2022-01-01 RX ORDER — SODIUM CHLORIDE 9 MG/ML
1 INJECTION, SOLUTION INTRAVENOUS CONTINUOUS
Status: ACTIVE | OUTPATIENT
Start: 2022-01-01 | End: 2022-01-01

## 2022-01-01 RX ORDER — POTASSIUM CHLORIDE 20 MEQ/1
20 TABLET, EXTENDED RELEASE ORAL DAILY
Qty: 5 TABLET | Refills: 0 | Status: SHIPPED | OUTPATIENT
Start: 2022-01-01 | End: 2022-01-01

## 2022-01-01 RX ORDER — POTASSIUM CHLORIDE 1.5 G/1.77G
20 POWDER, FOR SOLUTION ORAL ONCE
Status: COMPLETED | OUTPATIENT
Start: 2022-01-01 | End: 2022-01-01

## 2022-01-01 RX ORDER — FUROSEMIDE 40 MG/1
40 TABLET ORAL DAILY
Qty: 5 TABLET | Refills: 0 | Status: SHIPPED | OUTPATIENT
Start: 2022-01-01 | End: 2022-01-01

## 2022-01-01 RX ORDER — CEFAZOLIN SODIUM 2 G/100ML
2 INJECTION, SOLUTION INTRAVENOUS ONCE
Status: CANCELLED | OUTPATIENT
Start: 2022-01-01 | End: 2022-01-01

## 2022-01-01 RX ORDER — FUROSEMIDE 40 MG/1
TABLET ORAL
Qty: 5 TABLET | Refills: 0 | OUTPATIENT
Start: 2022-01-01

## 2022-01-01 RX ORDER — IPRATROPIUM BROMIDE AND ALBUTEROL SULFATE 2.5; .5 MG/3ML; MG/3ML
3 SOLUTION RESPIRATORY (INHALATION)
Status: DISCONTINUED | OUTPATIENT
Start: 2022-01-01 | End: 2022-01-01 | Stop reason: SDUPTHER

## 2022-01-01 RX ORDER — MULTIPLE VITAMINS W/ MINERALS TAB 9MG-400MCG
1 TAB ORAL DAILY
Status: DISCONTINUED | OUTPATIENT
Start: 2022-01-01 | End: 2022-01-01 | Stop reason: HOSPADM

## 2022-01-01 RX ORDER — CELECOXIB 200 MG/1
200 CAPSULE ORAL 2 TIMES DAILY
Status: DISCONTINUED | OUTPATIENT
Start: 2022-01-01 | End: 2022-01-01 | Stop reason: HOSPADM

## 2022-01-01 RX ORDER — CEFUROXIME AXETIL 250 MG/1
250 TABLET ORAL 2 TIMES DAILY
Qty: 20 TABLET | Refills: 1 | Status: SHIPPED | OUTPATIENT
Start: 2022-01-01 | End: 2022-01-01

## 2022-01-01 RX ORDER — LIDOCAINE HYDROCHLORIDE 10 MG/ML
INJECTION, SOLUTION INFILTRATION; PERINEURAL AS NEEDED
Status: DISCONTINUED | OUTPATIENT
Start: 2022-01-01 | End: 2022-01-01 | Stop reason: HOSPADM

## 2022-01-01 RX ORDER — ACETAMINOPHEN 325 MG/1
650 TABLET ORAL EVERY 4 HOURS PRN
Status: CANCELLED | OUTPATIENT
Start: 2022-01-01

## 2022-01-01 RX ORDER — METOPROLOL TARTRATE 5 MG/5ML
5 INJECTION INTRAVENOUS ONCE
Status: COMPLETED | OUTPATIENT
Start: 2022-01-01 | End: 2022-01-01

## 2022-01-01 RX ORDER — DOXYCYCLINE 100 MG/1
100 CAPSULE ORAL 2 TIMES DAILY
Qty: 14 CAPSULE | Refills: 0 | Status: SHIPPED | OUTPATIENT
Start: 2022-01-01 | End: 2022-01-01

## 2022-01-01 RX ORDER — SULFAMETHOXAZOLE AND TRIMETHOPRIM 800; 160 MG/1; MG/1
1 TABLET ORAL EVERY 12 HOURS SCHEDULED
Status: DISCONTINUED | OUTPATIENT
Start: 2022-01-01 | End: 2022-01-01

## 2022-01-01 RX ORDER — UBIDECARENONE 75 MG
CAPSULE ORAL DAILY
COMMUNITY
End: 2022-01-01 | Stop reason: SDUPTHER

## 2022-01-01 RX ORDER — NICOTINE 21 MG/24HR
1 PATCH, TRANSDERMAL 24 HOURS TRANSDERMAL
Start: 2022-01-01 | End: 2022-01-01

## 2022-01-01 RX ORDER — DOXYCYCLINE 100 MG/1
100 TABLET ORAL 2 TIMES DAILY
Qty: 20 TABLET | Refills: 0 | Status: SHIPPED | OUTPATIENT
Start: 2022-01-01 | End: 2022-07-09

## 2022-01-01 RX ORDER — SODIUM CHLORIDE 0.9 % (FLUSH) 0.9 %
1-10 SYRINGE (ML) INJECTION AS NEEDED
Status: DISCONTINUED | OUTPATIENT
Start: 2022-01-01 | End: 2022-01-01 | Stop reason: HOSPADM

## 2022-01-01 RX ORDER — SODIUM CHLORIDE 9 MG/ML
1 INJECTION, SOLUTION INTRAVENOUS CONTINUOUS
Status: CANCELLED | OUTPATIENT
Start: 2022-01-01 | End: 2022-01-01

## 2022-01-01 RX ORDER — ACETAMINOPHEN 500 MG
500 TABLET ORAL EVERY 4 HOURS PRN
Status: DISCONTINUED | OUTPATIENT
Start: 2022-01-01 | End: 2022-01-01 | Stop reason: HOSPADM

## 2022-01-01 RX ADMIN — NICOTINE 1 PATCH: 21 PATCH, EXTENDED RELEASE TRANSDERMAL at 08:46

## 2022-01-01 RX ADMIN — IPRATROPIUM BROMIDE AND ALBUTEROL SULFATE 3 ML: .5; 2.5 SOLUTION RESPIRATORY (INHALATION) at 07:58

## 2022-01-01 RX ADMIN — FLECAINIDE ACETATE 100 MG: 50 TABLET ORAL at 09:38

## 2022-01-01 RX ADMIN — MULTIVITAMIN TABLET 1 TABLET: TABLET at 08:27

## 2022-01-01 RX ADMIN — MELOXICAM 15 MG: 7.5 TABLET ORAL at 08:19

## 2022-01-01 RX ADMIN — FOLIC ACID 1000 MCG: 1 TABLET ORAL at 08:27

## 2022-01-01 RX ADMIN — DULOXETINE HYDROCHLORIDE 30 MG: 30 CAPSULE, DELAYED RELEASE ORAL at 08:13

## 2022-01-01 RX ADMIN — GABAPENTIN 300 MG: 300 CAPSULE ORAL at 08:13

## 2022-01-01 RX ADMIN — THIAMINE HCL TAB 100 MG 100 MG: 100 TAB at 08:27

## 2022-01-01 RX ADMIN — GABAPENTIN 300 MG: 300 CAPSULE ORAL at 16:03

## 2022-01-01 RX ADMIN — DOCUSATE SODIUM 100 MG: 100 CAPSULE, LIQUID FILLED ORAL at 08:13

## 2022-01-01 RX ADMIN — NITROFURANTOIN (MONOHYDRATE/MACROCRYSTALS) 100 MG: 75; 25 CAPSULE ORAL at 09:21

## 2022-01-01 RX ADMIN — IPRATROPIUM BROMIDE AND ALBUTEROL SULFATE 3 ML: .5; 2.5 SOLUTION RESPIRATORY (INHALATION) at 07:30

## 2022-01-01 RX ADMIN — CEFAZOLIN SODIUM 2 G: 2 INJECTION, SOLUTION INTRAVENOUS at 08:45

## 2022-01-01 RX ADMIN — PANTOPRAZOLE SODIUM 40 MG: 40 TABLET, DELAYED RELEASE ORAL at 20:16

## 2022-01-01 RX ADMIN — METOPROLOL TARTRATE 25 MG: 25 TABLET, FILM COATED ORAL at 08:13

## 2022-01-01 RX ADMIN — FUROSEMIDE 40 MG: 10 INJECTION, SOLUTION INTRAMUSCULAR; INTRAVENOUS at 08:20

## 2022-01-01 RX ADMIN — CELECOXIB 200 MG: 200 CAPSULE ORAL at 20:48

## 2022-01-01 RX ADMIN — ASPIRIN 81 MG: 81 TABLET, COATED ORAL at 17:58

## 2022-01-01 RX ADMIN — Medication 3 ML: at 08:20

## 2022-01-01 RX ADMIN — NITROFURANTOIN (MONOHYDRATE/MACROCRYSTALS) 100 MG: 75; 25 CAPSULE ORAL at 20:55

## 2022-01-01 RX ADMIN — FOLIC ACID 1000 MCG: 1 TABLET ORAL at 08:14

## 2022-01-01 RX ADMIN — SOTALOL HYDROCHLORIDE 80 MG: 80 TABLET ORAL at 23:04

## 2022-01-01 RX ADMIN — DOCUSATE SODIUM 100 MG: 100 CAPSULE, LIQUID FILLED ORAL at 20:16

## 2022-01-01 RX ADMIN — NICOTINE 1 PATCH: 14 PATCH, EXTENDED RELEASE TRANSDERMAL at 08:21

## 2022-01-01 RX ADMIN — PANTOPRAZOLE SODIUM 40 MG: 40 TABLET, DELAYED RELEASE ORAL at 08:43

## 2022-01-01 RX ADMIN — ASPIRIN 81 MG: 81 TABLET, COATED ORAL at 10:31

## 2022-01-01 RX ADMIN — DOCUSATE SODIUM 100 MG: 100 CAPSULE, LIQUID FILLED ORAL at 21:48

## 2022-01-01 RX ADMIN — DOCUSATE SODIUM 100 MG: 100 CAPSULE, LIQUID FILLED ORAL at 08:11

## 2022-01-01 RX ADMIN — NICOTINE 1 PATCH: 21 PATCH, EXTENDED RELEASE TRANSDERMAL at 08:15

## 2022-01-01 RX ADMIN — TAMSULOSIN HYDROCHLORIDE 0.4 MG: 0.4 CAPSULE ORAL at 20:16

## 2022-01-01 RX ADMIN — TAMSULOSIN HYDROCHLORIDE 0.4 MG: 0.4 CAPSULE ORAL at 21:43

## 2022-01-01 RX ADMIN — SODIUM CHLORIDE 1000 ML: 9 INJECTION, SOLUTION INTRAVENOUS at 17:05

## 2022-01-01 RX ADMIN — PANTOPRAZOLE SODIUM 40 MG: 40 TABLET, DELAYED RELEASE ORAL at 20:48

## 2022-01-01 RX ADMIN — CELECOXIB 200 MG: 200 CAPSULE ORAL at 08:14

## 2022-01-01 RX ADMIN — IPRATROPIUM BROMIDE AND ALBUTEROL SULFATE 3 ML: .5; 2.5 SOLUTION RESPIRATORY (INHALATION) at 19:49

## 2022-01-01 RX ADMIN — Medication 10 MG: at 09:39

## 2022-01-01 RX ADMIN — PANTOPRAZOLE SODIUM 40 MG: 40 TABLET, DELAYED RELEASE ORAL at 08:11

## 2022-01-01 RX ADMIN — SULFAMETHOXAZOLE AND TRIMETHOPRIM 1 TABLET: 800; 160 TABLET ORAL at 08:11

## 2022-01-01 RX ADMIN — GADOBENATE DIMEGLUMINE 15 ML: 529 INJECTION, SOLUTION INTRAVENOUS at 12:21

## 2022-01-01 RX ADMIN — ASPIRIN 81 MG: 81 TABLET, COATED ORAL at 08:11

## 2022-01-01 RX ADMIN — NITROFURANTOIN (MONOHYDRATE/MACROCRYSTALS) 100 MG: 75; 25 CAPSULE ORAL at 13:14

## 2022-01-01 RX ADMIN — CELECOXIB 200 MG: 200 CAPSULE ORAL at 21:40

## 2022-01-01 RX ADMIN — ASPIRIN 81 MG: 81 TABLET, COATED ORAL at 08:44

## 2022-01-01 RX ADMIN — DULOXETINE HYDROCHLORIDE 30 MG: 30 CAPSULE, DELAYED RELEASE ORAL at 08:43

## 2022-01-01 RX ADMIN — BISACODYL 10 MG: 10 SUPPOSITORY RECTAL at 08:28

## 2022-01-01 RX ADMIN — NICOTINE 1 PATCH: 14 PATCH, EXTENDED RELEASE TRANSDERMAL at 08:29

## 2022-01-01 RX ADMIN — ASPIRIN 81 MG: 81 TABLET, COATED ORAL at 08:20

## 2022-01-01 RX ADMIN — POTASSIUM CHLORIDE 20 MEQ: 1.5 POWDER, FOR SOLUTION ORAL at 08:20

## 2022-01-01 RX ADMIN — PANTOPRAZOLE SODIUM 40 MG: 40 TABLET, DELAYED RELEASE ORAL at 21:43

## 2022-01-01 RX ADMIN — PANTOPRAZOLE SODIUM 40 MG: 40 TABLET, DELAYED RELEASE ORAL at 21:40

## 2022-01-01 RX ADMIN — FLECAINIDE ACETATE 100 MG: 50 TABLET ORAL at 20:48

## 2022-01-01 RX ADMIN — PANTOPRAZOLE SODIUM 40 MG: 40 TABLET, DELAYED RELEASE ORAL at 08:14

## 2022-01-01 RX ADMIN — SULFAMETHOXAZOLE AND TRIMETHOPRIM 1 TABLET: 800; 160 TABLET ORAL at 20:16

## 2022-01-01 RX ADMIN — Medication 1 TABLET: at 08:44

## 2022-01-01 RX ADMIN — Medication 3 ML: at 21:48

## 2022-01-01 RX ADMIN — CELECOXIB 200 MG: 200 CAPSULE ORAL at 08:11

## 2022-01-01 RX ADMIN — MELOXICAM 15 MG: 7.5 TABLET ORAL at 17:58

## 2022-01-01 RX ADMIN — DOCUSATE SODIUM 50 MG AND SENNOSIDES 8.6 MG 2 TABLET: 8.6; 5 TABLET, FILM COATED ORAL at 08:27

## 2022-01-01 RX ADMIN — PANTOPRAZOLE SODIUM 40 MG: 40 TABLET, DELAYED RELEASE ORAL at 08:20

## 2022-01-01 RX ADMIN — Medication 1 TABLET: at 08:12

## 2022-01-01 RX ADMIN — IPRATROPIUM BROMIDE AND ALBUTEROL SULFATE 3 ML: .5; 2.5 SOLUTION RESPIRATORY (INHALATION) at 12:46

## 2022-01-01 RX ADMIN — CELECOXIB 200 MG: 200 CAPSULE ORAL at 20:16

## 2022-01-01 RX ADMIN — PANTOPRAZOLE SODIUM 40 MG: 40 TABLET, DELAYED RELEASE ORAL at 08:27

## 2022-01-01 RX ADMIN — DOCUSATE SODIUM 100 MG: 100 CAPSULE, LIQUID FILLED ORAL at 08:20

## 2022-01-01 RX ADMIN — METOPROLOL TARTRATE 5 MG: 5 INJECTION INTRAVENOUS at 12:10

## 2022-01-01 RX ADMIN — FLECAINIDE ACETATE 100 MG: 50 TABLET ORAL at 09:35

## 2022-01-01 RX ADMIN — TAMSULOSIN HYDROCHLORIDE 0.4 MG: 0.4 CAPSULE ORAL at 08:27

## 2022-01-01 RX ADMIN — CELECOXIB 200 MG: 200 CAPSULE ORAL at 08:44

## 2022-01-01 RX ADMIN — ONDANSETRON 4 MG: 2 INJECTION INTRAMUSCULAR; INTRAVENOUS at 09:39

## 2022-01-01 RX ADMIN — GABAPENTIN 300 MG: 300 CAPSULE ORAL at 21:40

## 2022-01-01 RX ADMIN — POTASSIUM CHLORIDE AND SODIUM CHLORIDE 100 ML/HR: 900; 150 INJECTION, SOLUTION INTRAVENOUS at 23:18

## 2022-01-01 RX ADMIN — DULOXETINE HYDROCHLORIDE 30 MG: 30 CAPSULE, DELAYED RELEASE ORAL at 08:11

## 2022-01-01 RX ADMIN — GABAPENTIN 300 MG: 300 CAPSULE ORAL at 15:56

## 2022-01-01 RX ADMIN — DOCUSATE SODIUM 100 MG: 100 CAPSULE, LIQUID FILLED ORAL at 20:48

## 2022-01-01 RX ADMIN — DULOXETINE HYDROCHLORIDE 30 MG: 30 CAPSULE, DELAYED RELEASE ORAL at 21:44

## 2022-01-01 RX ADMIN — SODIUM CHLORIDE, PRESERVATIVE FREE 10 ML: 5 INJECTION INTRAVENOUS at 21:46

## 2022-01-01 RX ADMIN — BISACODYL 5 MG: 5 TABLET, COATED ORAL at 08:27

## 2022-01-01 RX ADMIN — METOPROLOL TARTRATE 50 MG: 50 TABLET, FILM COATED ORAL at 20:16

## 2022-01-01 RX ADMIN — ACETAMINOPHEN 650 MG: 325 TABLET ORAL at 14:35

## 2022-01-01 RX ADMIN — POLYETHYLENE GLYCOL 3350 17 G: 17 POWDER, FOR SOLUTION ORAL at 08:27

## 2022-01-01 RX ADMIN — FOLIC ACID 1000 MCG: 1 TABLET ORAL at 08:11

## 2022-01-01 RX ADMIN — IPRATROPIUM BROMIDE AND ALBUTEROL SULFATE 3 ML: .5; 2.5 SOLUTION RESPIRATORY (INHALATION) at 18:17

## 2022-01-01 RX ADMIN — GABAPENTIN 300 MG: 300 CAPSULE ORAL at 08:11

## 2022-01-01 RX ADMIN — POTASSIUM CHLORIDE AND SODIUM CHLORIDE 100 ML/HR: 900; 150 INJECTION, SOLUTION INTRAVENOUS at 11:44

## 2022-01-01 RX ADMIN — POTASSIUM CHLORIDE AND SODIUM CHLORIDE 100 ML/HR: 900; 150 INJECTION, SOLUTION INTRAVENOUS at 00:23

## 2022-01-01 RX ADMIN — Medication 1 TABLET: at 08:14

## 2022-01-01 RX ADMIN — LIDOCAINE 1 PATCH: 50 PATCH CUTANEOUS at 06:13

## 2022-01-01 RX ADMIN — NICOTINE 1 PATCH: 14 PATCH, EXTENDED RELEASE TRANSDERMAL at 17:58

## 2022-01-01 RX ADMIN — SODIUM CHLORIDE 1000 ML: 9 INJECTION, SOLUTION INTRAVENOUS at 22:55

## 2022-01-01 RX ADMIN — METOPROLOL TARTRATE 25 MG: 25 TABLET, FILM COATED ORAL at 20:48

## 2022-01-01 RX ADMIN — DULOXETINE HYDROCHLORIDE 30 MG: 30 CAPSULE, DELAYED RELEASE ORAL at 08:27

## 2022-01-01 RX ADMIN — DOCUSATE SODIUM 100 MG: 100 CAPSULE, LIQUID FILLED ORAL at 21:41

## 2022-01-01 RX ADMIN — TAMSULOSIN HYDROCHLORIDE 0.4 MG: 0.4 CAPSULE ORAL at 20:48

## 2022-01-01 RX ADMIN — IPRATROPIUM BROMIDE AND ALBUTEROL SULFATE 3 ML: .5; 2.5 SOLUTION RESPIRATORY (INHALATION) at 15:23

## 2022-01-01 RX ADMIN — GABAPENTIN 300 MG: 300 CAPSULE ORAL at 20:48

## 2022-01-01 RX ADMIN — FOLIC ACID 1000 MCG: 1 TABLET ORAL at 21:45

## 2022-01-01 RX ADMIN — GABAPENTIN 300 MG: 300 CAPSULE ORAL at 08:27

## 2022-01-01 RX ADMIN — FUROSEMIDE 40 MG: 10 INJECTION, SOLUTION INTRAMUSCULAR; INTRAVENOUS at 15:03

## 2022-01-01 RX ADMIN — DOCUSATE SODIUM 100 MG: 100 CAPSULE, LIQUID FILLED ORAL at 08:44

## 2022-01-01 RX ADMIN — SOTALOL HYDROCHLORIDE 80 MG: 80 TABLET ORAL at 08:20

## 2022-01-01 RX ADMIN — Medication 1 TABLET: at 21:44

## 2022-01-01 RX ADMIN — SULFAMETHOXAZOLE AND TRIMETHOPRIM 1 TABLET: 800; 160 TABLET ORAL at 09:39

## 2022-01-01 RX ADMIN — GABAPENTIN 300 MG: 300 CAPSULE ORAL at 08:43

## 2022-01-01 RX ADMIN — FLECAINIDE ACETATE 100 MG: 50 TABLET ORAL at 08:44

## 2022-01-01 RX ADMIN — NICOTINE 1 PATCH: 21 PATCH, EXTENDED RELEASE TRANSDERMAL at 08:13

## 2022-01-01 RX ADMIN — FLECAINIDE ACETATE 100 MG: 50 TABLET ORAL at 11:43

## 2022-01-01 RX ADMIN — ACETAMINOPHEN 650 MG: 325 TABLET ORAL at 21:48

## 2022-01-01 RX ADMIN — FOLIC ACID 1000 MCG: 1 TABLET ORAL at 08:43

## 2022-01-01 RX ADMIN — NICOTINE 1 PATCH: 21 PATCH, EXTENDED RELEASE TRANSDERMAL at 21:45

## 2022-01-01 RX ADMIN — GABAPENTIN 300 MG: 300 CAPSULE ORAL at 20:15

## 2022-01-01 NOTE — DISCHARGE INSTR - ACTIVITY
WEIGHT BEARING AS TOLERATED TO LEFT LOWER EXTREMITY - FOLLOW POSTERIOR HIP PRECAUTIONS FOR 6 WEEKS

## 2022-01-01 NOTE — PLAN OF CARE
Goal Outcome Evaluation:           Progress: improving     Patient continues to be alert and oriented. Patient only complains of pain when rolling from side-side. CIWA scale continues to be 0. No bleeding noted at surgery site. Will continue to monitor.

## 2022-01-01 NOTE — CASE MANAGEMENT/SOCIAL WORK
Case Management Discharge Note      Final Note: Pt to d/c today to [facility]. Nurse to call report to [phone] and fax summary to [phone]. Pt has a [facility] van scheduled for noon on Saturday 1/1. Please have patient at the maternity entrance in the [address] building by 11:45 for transport. CM will follow. Lashaun ext. [phone]         Selected Continued Care - Admitted Since 12/29/2021     Destination Coordination complete.    Service Provider Selected Services Address Phone Fax Patient Preferred    [facility]  Inpatient Rehabilitation [address] [address] [phone] --          Durable Medical Equipment    No services have been selected for the patient.              Dialysis/Infusion    No services have been selected for the patient.              Home Medical Care     Service Provider Selected Services Address Phone Fax Patient Preferred    [facility]  Home Health Services [address] [place] [address] [phone] --          Therapy    No services have been selected for the patient.              Community Resources    No services have been selected for the patient.              Community & DME    No services have been selected for the patient.                  Transportation Services  W/C Van:  ([facility])    Final Discharge Disposition Code: 62 - inpatient rehab facility

## 2022-01-01 NOTE — PROGRESS NOTES
"Orthopedic Daily Progress Note      CC: RASHARD overnight. Patient states weightbearing with PT has been painful.    Pain well controlled  General: no fevers, chills  Abdomen: no nausea, vomiting, or diarrhea    No other complaints    Physical Exam:  I have reviewed the vital signs.  Temp:  [98.1 °F (36.7 °C)-98.8 °F (37.1 °C)] 98.8 °F (37.1 °C)  Heart Rate:  [] 107  Resp:  [18-19] 18  BP: (114-138)/(77-95) 126/83    Objective:  Vital signs: (most recent): Blood pressure 126/83, pulse 107, temperature 98.8 °F (37.1 °C), temperature source Oral, resp. rate 18, height 177.8 cm (70\"), weight 81.9 kg (180 lb 8 oz), SpO2 91 %.            General Appearance:    Alert, cooperative, no distress  Extremities: No clubbing, cyanosis, or edema to lower extremities  Pulses:  2+ in distal surgical extremity  Skin: Incision Clean/dry/intact      Results Review:    I have reviewed the labs, radiology results and diagnostic studies: Hgb 10.2    Results from last 7 days   Lab Units 01/01/22  0648   WBC 10*3/mm3 7.63   HEMOGLOBIN g/dL 10.2*   PLATELETS 10*3/mm3 187     Results from last 7 days   Lab Units 01/01/22  0648   SODIUM mmol/L 137   POTASSIUM mmol/L 4.2   CO2 mmol/L 27.0   CREATININE mg/dL 0.63*   GLUCOSE mg/dL 91       I have reviewed the medications.    Assessment/Problem List  POD# 3 Day Post-Op  S/p L Hip tonya for displaced femoral neck fx    Plan  WBAT LLE with posterior hip precautions x 6 weeks  PT/OT  DVT ppx with Lovenox 40 mg sq daily x 2 weeks followed by  mg daily x 4 weeks        Discharge Planning: I expect patient to be discharged to [facility] today.    Yesi Agarwal PA-C  01/01/22  10:58 EST            "

## 2022-01-01 NOTE — PLAN OF CARE
Problem: Adult Inpatient Plan of Care  Goal: Plan of Care Review  Outcome: Met  Flowsheets  Taken 1/1/2022 1059 by Mariaelena Ayoub RN  Progress: improving  Taken 12/31/2021 1617 by Trena Sewell  Plan of Care Reviewed With: patient     Problem: Pain Chronic (Persistent) (Comorbidity Management)  Goal: Acceptable Pain Control and Functional Ability  Outcome: Met  Intervention: Manage Persistent Pain  Recent Flowsheet Documentation  Taken 1/1/2022 1000 by Mariaelena Ayoub RN  Medication Review/Management: medications reviewed  Taken 1/1/2022 0827 by Mariaelena Ayoub RN  Bowel Elimination Promotion: adequate fluid intake promoted  Medication Review/Management: medications reviewed     Problem: Fall Injury Risk  Goal: Absence of Fall and Fall-Related Injury  Outcome: Met  Intervention: Identify and Manage Contributors to Fall Injury Risk  Recent Flowsheet Documentation  Taken 1/1/2022 1000 by Mariaelena Ayoub RN  Medication Review/Management: medications reviewed  Taken 1/1/2022 0827 by Mariaelena Ayoub RN  Medication Review/Management: medications reviewed  Intervention: Promote Injury-Free Environment  Recent Flowsheet Documentation  Taken 1/1/2022 1000 by Mariaelena Ayoub RN  Safety Promotion/Fall Prevention:   activity supervised   assistive device/personal items within reach   clutter free environment maintained   room organization consistent   safety round/check completed   toileting scheduled  Taken 1/1/2022 0827 by Mariaelena Ayoub RN  Safety Promotion/Fall Prevention:   activity supervised   assistive device/personal items within reach   clutter free environment maintained   room organization consistent   safety round/check completed   toileting scheduled     Problem: Adjustment to Injury (Hip Fracture)  Goal: Optimal Coping with Change in Health Status  Outcome: Met     Problem: Bleeding (Hip Fracture)  Goal: Absence of Bleeding  Outcome: Met     Problem: Bowel Elimination Impaired (Hip  Fracture)  Goal: Effective Bowel Elimination  Outcome: Met  Intervention: Promote Effective Bowel Elimination  Recent Flowsheet Documentation  Taken 1/1/2022 0827 by Mariaelena Ayoub RN  Bowel Elimination Promotion: adequate fluid intake promoted     Problem: Delayed Union/Nonunion (Hip Fracture)  Goal: Fracture Stability  Outcome: Met     Problem: Embolism (Hip Fracture)  Goal: Absence of Embolism  Outcome: Met  Intervention: Prevent and Manage Embolism Risk  Recent Flowsheet Documentation  Taken 1/1/2022 1000 by Mariaelena Ayoub RN  VTE Prevention/Management: (lovenox) other (see comments)  Taken 1/1/2022 0827 by Mariaelena Ayoub RN  VTE Prevention/Management: (lovenox) other (see comments)     Problem: Functional Ability Impaired (Hip Fracture)  Goal: Optimal Functional Performance  Outcome: Met  Intervention: Promote Optimal Functional Status  Recent Flowsheet Documentation  Taken 1/1/2022 1000 by Mariaelena Ayoub RN  Activity Management:   activity adjusted per tolerance   activity encouraged  Taken 1/1/2022 0827 by Mariaelena Ayoub RN  Activity Management:   activity adjusted per tolerance   activity encouraged     Problem: Pain (Hip Fracture)  Goal: Acceptable Pain Level  Outcome: Met     Problem: Urinary Elimination Impaired (Hip Fracture)  Goal: Effective Urinary Elimination  Outcome: Met   Goal Outcome Evaluation:           Progress: improving

## 2022-01-01 NOTE — DISCHARGE SUMMARY
[facility] Medicine Services  TRANSFER SUMMARY    Patient Name: Ubaldo Mcleod  : 1950  MRN: [id number]    Date of Admission: 2021  Date of Discharge:  22  Length of Stay: 3  Primary Care Physician: Best Garcia DO    Consults     Date and Time Order Name Status Description    2021  6:04 AM Inpatient Orthopedic Surgery Consult Completed           Hospital Course     Presenting Problem:   Fall, initial encounter [W19.XXXA]    Active Hospital Problems    Diagnosis  POA   • Elevated LFTs [R79.89]  Yes   • Closed left hip fracture (HCC) [S72.002A]  Yes   • Fall [W19.XXXA]  Yes   • Tobacco abuse [Z72.0]  Yes   • Hyperbilirubinemia [E80.6]  Yes   • Alcohol abuse [F10.10]  Yes   • GERD (gastroesophageal reflux disease) [K21.9]  Yes   • Chronic hepatitis C with cirrhosis (HCC) [B18.2, K74.60]  Yes   • Hypertension [I10]  Yes      Resolved Hospital Problems   No resolved problems to display.      Hospital Course:  Ubaldo Mcleod is a 71 y.o. male with history of hep C/cirrhosis, alcohol use admitted for fall with left hip fracture. Orthopedic surgeon Dr. Sharp performed a left hip hemiarthroplasty on 2021. Recommended to maintain weight bearing as tolerated with posterior hip precautions for 6 weeks. He will continue Lovenox 40 mg sq daily x 2 weeks following by  mg daily x 4 weeks for DVT prophylaxis. PT/OT have recommended inpatient rehab.  has arranged for inpatient rehab at [facility]. The patient will be discharged today in stable condition.      Left hip fracture  -Status post repair  with Dr. Sharp  -WBAT LLE with posterior hip precautions x 6 weeks  -DVT prophylaxis per orthopedic surgery, Lovenox 40 mg subcu daily for 2 weeks followed by  mg x 2 weeks     EtOH use, dependence  -Ethanol level elevated on admission  -CIWA scale has been 0  -Continue to monitor for s/s withdrawal      Chronic hep C  Cirrhosis  Chronic  hyperbilirubinemia  -stable      BPH  -Continue Flomax     GERD  -Protonix    Discharge Follow Up Recommendations for outpatient labs/diagnostics:   Follow up with PCP in 1 week.   Follow with Dr. Sharp in 2 weeks.     Day of Discharge     HPI:   Patient seen resting in bed no apparent distress.  No acute events overnight per nursing.  Pain is currently well controlled.  He is aware of plan to discharge to [facility] today and is agreeable.  We discussed the importance of taking all medications as prescribed and keeping all recommended follow-up appointments.  He expressed no additional concerns at this time.    Review of Systems  Gen- No fevers, chills  CV- No chest pain, palpitations  Resp- No cough, dyspnea  GI- No N/V/D, abd pain     Vital Signs:   Temp:  [98.1 °F (36.7 °C)-98.8 °F (37.1 °C)] 98.8 °F (37.1 °C)  Heart Rate:  [] 107  Resp:  [18-19] 18  BP: (114-138)/(77-95) 126/83     Physical Exam:  Constitutional: No acute distress, awake, alert  HENT: NCAT, mucous membranes moist  Respiratory: Clear to auscultation bilaterally, respiratory effort normal   Cardiovascular: RRR, no murmurs, cap refill brisk   Gastrointestinal: Positive bowel sounds, soft, nontender, nondistended  Musculoskeletal: trace BLE edema, left hip dressing in place   Psychiatric: Appropriate affect, cooperative  Neurologic: Oriented x 3, moves all extremities, speech clear  Skin: warm, dry, no visible rash     Pertinent Results     Results from last 7 days   Lab Units 01/01/22  0648 12/30/21  0357 12/29/21  1336 12/29/21  0442   WBC 10*3/mm3 7.63 9.34 6.02 9.01   HEMOGLOBIN g/dL 10.2* 11.0* 11.6* 11.6*   HEMATOCRIT % 31.5* 33.4* 35.0* 34.6*   PLATELETS 10*3/mm3 187 203 201 228   SODIUM mmol/L 137 135*  --  135*   POTASSIUM mmol/L 4.2 4.8  --  3.9   CHLORIDE mmol/L 102 102  --  101   CO2 mmol/L 27.0 26.0  --  24.0   BUN mg/dL 8 8  --  8   CREATININE mg/dL 0.63* 0.72*  --  0.89   GLUCOSE mg/dL 91 162*  --  111*   CALCIUM mg/dL 8.6  8.4*  --  8.8     Results from last 7 days   Lab Units 01/01/22  0648 12/30/21  0357 12/29/21 0442   BILIRUBIN mg/dL 3.3* 3.1* 2.8*   ALK PHOS U/L 397* 403* 497*   ALT (SGPT) U/L 25 20 28   AST (SGOT) U/L 55* 29 52*   PROTIME Seconds  --   --  12.5   INR   --   --  0.96   APTT seconds  --   --  29.1           Invalid input(s): TG, LDLCALC, LDLREALC      Brief Urine Lab Results     None          Microbiology Results Abnormal     Procedure Component Value - Date/Time    COVID PRE-OP / PRE-PROCEDURE SCREENING ORDER (NO ISOLATION) - Swab, Nasopharynx [[id number]]  (Normal) Collected: 12/29/21 0443    Lab Status: Final result Specimen: Swab from Nasopharynx Updated: 12/29/21 0517    Narrative:      The following orders were created for panel order COVID PRE-OP / PRE-PROCEDURE SCREENING ORDER (NO ISOLATION) - Swab, Nasopharynx.  Procedure                               Abnormality         Status                     ---------                               -----------         ------                     COVID-19 and FLU A/B PCR...[[id number]]  Normal              Final result                 Please view results for these tests on the individual orders.    COVID-19 and FLU A/B PCR - Swab, Nasopharynx [[id number]]  (Normal) Collected: 12/29/21 0443    Lab Status: Final result Specimen: Swab from Nasopharynx Updated: 12/29/21 0517     COVID19 Not Detected     Influenza A PCR Not Detected     Influenza B PCR Not Detected    Narrative:      Fact sheet for providers: https://www.fda.gov/media/941473/download    Fact sheet for patients: https://www.fda.gov/media/350538/download    Test performed by PCR.          Imaging Results (All)     Procedure Component Value Units Date/Time    XR Hip With or Without Pelvis 2 - 3 View Left [[id number]] Collected: 12/29/21 1949     Updated: 12/29/21 1952    Narrative:      CR Hip Uni Comp Min 2 Vws LT    INDICATION:   [place] left hip arthroplasty.    COMPARISON:   Left hip 12/29/2021    FINDINGS:  AP  and frog-leg lateral view(s) of the left hip.  Interval left hip arthroplasty. Alignment is anatomic. Postsurgical changes in the adjacent soft tissues. Right hip ORIF again noted. Bony pelvis and sacrum appear grossly intact.         Impression:      Stable postop left hip arthroplasty.    Signer Name: Wander Jc MD   Signed: 12/29/2021 7:49 PM   Workstation Name: [id number]    [facility]    XR Hip With or Without Pelvis 2 - 3 View Left [[id number]] Collected: 12/29/21 0415     Updated: 12/29/21 0417    Narrative:      CR Hip Uni Comp Min 2 Vws LT    INDICATION:   Left hip pain after fall.    COMPARISON:   10/8/2021    FINDINGS:  AP pelvis and AP and cross table lateral view(s) of the left hip.  Patient is status post ORIF of the right proximal femur. Hardware is unchanged. No hip dislocation on either side. There is an acute left femoral neck fracture. No other fractures are  seen. There is no sacroiliac joint diastasis.        Impression:        1. Acute left femoral neck fracture. No dislocation.  2. Stable appearance of the right hip.    Signer Name: John Fairbanks MD   Signed: 12/29/2021 4:15 AM   Workstation Name: [email or web address]    [facility]    XR Chest 1 View [[id number]] Collected: 12/29/21 0414     Updated: 12/29/21 0416    Narrative:      CR Chest 1 Vw    INDICATION:   Fall. Hip fracture.     COMPARISON:    3/17/2021    FINDINGS:  Portable AP view(s) of the chest.  Cardiac and mediastinal contours are normal. There is atherosclerotic disease in the aorta. There are patchy opacities in the left lung base and right upper lobe. Considerations include atelectasis or pneumonia. Lungs  otherwise are clear. No pneumothorax.      Impression:      Opacity in the right upper lobe and at the left base could reflect pneumonia or atelectasis. Otherwise negative.    Signer Name: John Fairbanks MD   Signed: 12/29/2021 4:14 AM   Workstation Name: [email or web address]     "[facility]                  Discharge Details        Discharge Medications      New Medications      Instructions Start Date   aspirin  MG tablet   325 mg, Oral, Daily   Start Date: January 16, 2022     enoxaparin 40 MG/0.4ML solution syringe  Commonly known as: LOVENOX   40 mg, Subcutaneous, Every 24 Hours      multivitamin tablet tablet   1 tablet, Oral, Daily   Start Date: January 2, 2022     nicotine 14 MG/24HR patch  Commonly known as: NICODERM CQ   1 patch, Transdermal, Every 24 Hours Scheduled   Start Date: January 2, 2022     thiamine 100 MG tablet  tablet  Commonly known as: VITAMIN B-1   100 mg, Oral, Daily   Start Date: January 2, 2022        Changes to Medications      Instructions Start Date   cyanocobalamin 1000 MCG/ML injection  What changed: See the new instructions.   INJECT 1 MILLILITER( 1000 MCG) INTRAMUSCULARLY MONTHLY      oxyCODONE 10 MG tablet  Commonly known as: ROXICODONE  What changed:   · medication strength  · how much to take  · reasons to take this   10 mg, Oral, Every 6 Hours PRN         Continue These Medications      Instructions Start Date   cyclobenzaprine 10 MG tablet  Commonly known as: FLEXERIL   10 mg, Oral, 3 Times Daily PRN      DULoxetine 30 MG capsule  Commonly known as: CYMBALTA   TAKE ONE CAPSULE BY MOUTH DAILY      folic acid 1 MG tablet  Commonly known as: FOLVITE   TAKE 1 TABLET BY MOUTH EVERY DAY      gabapentin 300 MG capsule  Commonly known as: NEURONTIN   300 mg, Oral, 3 Times Daily, Start taking QHS x 3 days, then BID x 3 days then TID      lidocaine 5 %  Commonly known as: LIDODERM   1 patch, Transdermal, Every 24 Hours, Remove & Discard patch within 12 hours or as directed by MD      pantoprazole 40 MG EC tablet  Commonly known as: PROTONIX   40 mg, Oral, 2 Times Daily      polyethylene glycol 17 g packet  Commonly known as: MIRALAX   17 g, Oral, Daily PRN      Syringe/Needle (Disp) 25G X 1\" 3 ML misc  Commonly known as: B-D 3CC " "LUER-KISHA SYR 25GX1\"   USE AS DIRECTED EVERY MONTH      tamsulosin 0.4 MG capsule 24 hr capsule  Commonly known as: FLOMAX   TAKE 1 CAPSULE BY MOUTH AT BEDTIME      traMADol 50 MG tablet  Commonly known as: ULTRAM   50 mg, Oral, Every 8 Hours PRN         Stop These Medications    celecoxib 200 MG capsule  Commonly known as: CeleBREX            No Known Allergies      Discharge Disposition:  Rehab Facility or Unit (DC - External)    Discharge Diet:  Diet Order   Procedures   • Diet Regular       Discharge Activity:  Activity Instructions     Activity as Tolerated     Additional Activity Instructions:    WEIGHT BEARING AS TOLERATED TO LEFT LOWER EXTREMITY - FOLLOW POSTERIOR HIP PRECAUTIONS FOR 6 WEEKS             CODE STATUS:    Code Status and Medical Interventions:   Ordered at: 12/29/21 0531     Level Of Support Discussed With:    Patient     Code Status (Patient has no pulse and is not breathing):    CPR (Attempt to Resuscitate)     Medical Interventions (Patient has pulse or is breathing):    Full Support         Future Appointments   Date Time Provider Department Center   1/20/2022 10:00 AM Aamir Manuel MD [facility]   1/27/2022 11:30 AM Glenn Stubbs MD [facility]   1/28/2022 11:15 AM Liz Pozo PA-C [facility]   3/29/2022  8:00 AM Neno Ocampo MD [facility]       Additional Instructions for the Follow-ups that You Need to Schedule     Discharge Follow-up with PCP   As directed       Currently Documented PCP:    Best Garcia DO    PCP Phone Number:    [phone]     Follow Up Details: Follow up with PCP in 1 week.         Discharge Follow-up with Specified Provider: Follow up with Dr. Sharp in 2 weeks.   As directed      To: Follow up with Dr. Sharp in 2 weeks.                 Time Spent on Discharge: I spent  34  minutes on this discharge activity which included: face-to-face encounter with the patient, reviewing the data in the system, coordination of the care with " the nursing staff as well as consultants, documentation, and entering orders.

## 2022-01-06 NOTE — TELEPHONE ENCOUNTER
UNABLE TO TRANSFER    Caller: C[facility]INAL ETIENNE    Relationship to patient:      Best call back number: [phone]    Patient is needing: PT NEEDS TO BE SCHEDULED FOR A HOSPITAL FOLLOW UP FROM [facility]; HE IS BEING RELEASED TODAY 01/06/2022; AL TRIPPED AND FELL AND FRACTURED HIS NECK

## 2022-01-14 NOTE — PROGRESS NOTES
"Chief Complaint: \"Pain in my lower back and left hip.\"         History of Present Illness:   Patient: Mr. Ubaldo Mcleod, 71 y.o. male   Referring Physician: Samantha Pozo PA-C  Reason for Referral: Consultation for chronic intractable lower back pain.   Pain History: Patient reports a longstanding history of back pain.  Patient has a history of T9 and T12 compression deformity that were treated conservatively.  He presents with a several month history of lower back pain radiating into the left gluteal region, which began without incident.  Initially, he experienced some radiation into the left lower extremity.  MRI of the lumbar spine without contrast on 10/25/2021 revealed lumbar facet arthropathy. There is a disc herniation at L4-L5. Patient has failed to obtain pain relief with conservative measures including oral analgesics, physical therapy, chiropractic therapy, to name a few. Ubaldo Mcleod underwent neurosurgical consultation with Samantha Pozo PA-C on 12/17/2021, and was found not to be a surgical candidate at that time. Patient underwent  left hip hemiarthroplasty with Dr. Brenden Sharp on 12/29/2021 for treatment of displaced femoral neck fracture that occurred after falling.  Upon reviewing labs from his admission, his alcohol level was elevated.  He was transferred to [facility] on 01/01/2022 and stayed there until 1/6/2022.  For the past several weeks, pain has centralized.  It appears that the pain is multifactorial with a potential source of left sacroiliac pain and some mechanical back pain since his fall.  Pain has progressed in intensity over the past months.   Pain Description: Constant pain with intermittent exacerbation, described as sharp, aching, and burning sensation.   Radiation of Pain: The pain radiates from the lumbar region into the left gluteal region   Pain intensity today: 9/10  Average pain intensity last week: 7/10  Pain intensity ranges from: 2/10 to 9/10  Aggravating " factors: Pain increases with bending, twisting, standing, walking. Patient describes neurogenic claudication. Patient uses a walker  Alleviating factors: Pain decreases with sitting down  Associated Symptoms:   Patient denies pain, numbness, or weakness in the lower extremities.   Patient denies any new bladder or bowel problems.   Patient reports difficulties with his balance and several recent falls, last fall on 12/29/2021.   Pain interferes with sleep causing sleep fragmentation   Stiffness    Review of previous therapies and additional medical records:  Ubaldo Mcleod has already failed the following measures, including:   Conservative Measures: Oral analgesics, opioids, topical analgesics, ice, heat, chiropractic therapy in the past, physical therapy   Interventional Measures: None  Surgical Measures: No history of previous lumbar spine surgery. Left hip hemiarthroplasty with Dr. Brenden Sharp on 12/29/2021 for treatment of displaced femoral neck fracture. History of ORIF right hip 01/28/2021 with Dr. Brenden Sharp   Ubaldo Mcleod underwent neurosurgical consultation with Samantha Pozo PA-C on 12/17/2021, and was found not to be a surgical candidate.  Ubaldo Mcleod presents with significant comorbidities including alcohol abuse, Mraroquin's esophagus, hemochromatosis, hypertension, chronic hepatitis C with cirrhosis, BPH, porphyria cutanea tarda, stage III chronic kidney disease, peripheral neuropathy, current everyday smoker  In terms of current analgesics, Ubaldo Mcleod takes: Celecoxib, gabapentin, oxycodone PRN, cyclobenzaprine PRN, duloxetine  I have reviewed Pradeep Report #[id number] (oxycodone by Sen Lorenzo, gabapentin by Samantha Pozo PA-C) consistent with medication reconciliation.  SOAPP: High Risk     Global Pain Scale 01-20 2022          Pain 16          Feelings 0          Clinical outcomes 15          Activities 25          GPS Total: 56            Review of Diagnostic  Studies:  I have reviewed the images with the patient as well as the reports, as follows;  MRI of the lumbar spine without contrast on 10/25/2021.  Appearance of compression deformities at T9 and T12.  There did not compression deformity appeared to be acute with uniform marrow edema and superior endplate compression with 50% compression deformity. The tip of the conus medullaris is seen at L1.  Axial imaging:  T9-T10, T10-T11, T11-T12: No significant canal stenosis with only mild canal stenosis along the superior margin of T12 due to mild posterior retropulsion.  T12-L1, L1-L2, L2-L3: No significant canal or foraminal stenosis  L3-L4: Broad-based disc bulge, facet hypertrophy.  Mild canal stenosis.  No significant foraminal stenosis  L4-L5: Broad-based disc bulge, epidural lipomatosis, facet hypertrophy contributing to mild canal stenosis and moderate to severe left lateral recess stenosis.  No significant foraminal stenosis   L5-S1: Degenerative disc disease.  No significant canal or foraminal stenosis  Lumbar spine x-rays on 10/21/2021.  Evidence of compression deformity at T12 with 50 to 60% loss of height.  Otherwise, vertebral body heights and alignment are maintained.  There is diffuse facet hypertrophy particularly at L4-L5.    Review of Systems   Cardiovascular: Positive for leg swelling.   Genitourinary: Positive for urgency.   Musculoskeletal: Positive for arthralgias and back pain.   All other systems reviewed and are negative.        Patient Active Problem List   Diagnosis   • Marroquin's esophagus   • Hemochromatosis   • Hypertension   • Lacunar infarction (HCC)   • Peptic ulcer   • Cobalamin deficiency   • Chronic hepatitis C with cirrhosis (HCC)   • Benign prostatic hypertrophy   • Hyperbilirubinemia   • Porphyria cutanea tarda (HCC)   • Memory loss   • Stage 3 chronic kidney disease (HCC)   • Right hip fracture (CMS/HCC)   • Smoker   • GERD (gastroesophageal reflux disease)   • B12 deficiency   •  Peripheral neuropathy   • Medicare annual wellness visit, subsequent   • Acute bilateral low back pain without sciatica   • Urge incontinence   • Elevated LFTs   • Closed left hip fracture (HCC)   • Fall   • Tobacco abuse   • Hyperbilirubinemia   • Alcohol abuse   • History of compression fracture of spine   • Lumbosacral radiculopathy due to intervertebral disc disorder   • Lumbar stenosis with neurogenic claudication   • Lumbosacral disc herniation   • Spondylosis of lumbar region without myelopathy or radiculopathy   • Sacroiliac joint dysfunction of left side   • Encounter for smoking cessation counseling   • Gait disturbance   • Physical deconditioning   • History of hemiarthroplasty of left hip   • At high risk for falls       Past Medical History:   Diagnosis Date   • Chronic hepatitis C with cirrhosis (HCC)    • ED (erectile dysfunction) of non-organic origin    • GERD (gastroesophageal reflux disease)    • Hemochromatosis    • History of cirrhosis    • Homocysteinemia    • Porphyria cutanea tarda (HCC)    • Weight loss          Past Surgical History:   Procedure Laterality Date   • CATARACT EXTRACTION WITH INTRAOCULAR LENS IMPLANT      dec 2016 - has 3 surgries on left eye and 1 on the right eye.    • COLONOSCOPY  2016   • ENDOSCOPY  2016   • EYE SURGERY     • HIP HEMIARTHROPLASTY Left 12/29/2021    Procedure: HIP HEMIARTHROPLASTY LEFT;  Surgeon: Brenden Sharp Jr., MD;  Location:  [facility];  Service: Orthopedics;  Laterality: Left;   • HIP TROCHANTERIC NAILING WITH INTRAMEDULLARY HIP SCREW Right 1/28/2021    Procedure: HIP TROCHANTERIC NAILING WITH INTRAMEDULLARY HIP SCREW RIGHT;  Surgeon: Brenden Sharp Jr., MD;  Location:  [facility];  Service: Orthopedics;  Laterality: Right;   • LIVER BIOPSY     • REFRACTIVE SURGERY  06/2017    follow up surgery on both eyes          Family History   Problem Relation Age of Onset   • Cancer Mother    • No Known Problems Father    • No Known Problems Other    •  "Cancer Sister    • Diabetes Brother    • Stroke Brother          Social History     Socioeconomic History   • Marital status:    Tobacco Use   • Smoking status: Current Every Day Smoker     Packs/day: 1.50     Years: 0.00     Pack years: 0.00     Types: Cigarettes     Start date: 1966   • Smokeless tobacco: Never Used   • Tobacco comment:  · 1 - 1 1/2 PACKS PER DAY, FOR THE PAST 50 YEARS   Vaping Use   • Vaping Use: Never used   Substance and Sexual Activity   • Alcohol use: Yes     Comment: 3-4 glasses of bourbon every night    • Drug use: Never   • Sexual activity: Defer     Partners: Female          Current Outpatient Medications:   •  aspirin  MG tablet, Take 1 tablet by mouth Daily., Disp: , Rfl:   •  celecoxib (CeleBREX) 200 MG capsule, TAKE 1 CAPSULE BY MOUTH TWICE DAILY, Disp: 60 capsule, Rfl: 2  •  cyanocobalamin 1000 MCG/ML injection, INJECT 1 MILLILITER( 1000 MCG) INTRAMUSCULARLY MONTHLY (Patient taking differently: TOOK ABOUT 3 WEEKS AGO), Disp: 10 mL, Rfl: 2  •  docusate sodium (COLACE) 100 MG capsule, Take 100 mg by mouth 2 (Two) Times a Day., Disp: , Rfl:   •  DULoxetine (CYMBALTA) 30 MG capsule, TAKE ONE CAPSULE BY MOUTH DAILY, Disp: 60 capsule, Rfl: 0  •  folic acid (FOLVITE) 1 MG tablet, TAKE 1 TABLET BY MOUTH EVERY DAY, Disp: 90 tablet, Rfl: 3  •  gabapentin (NEURONTIN) 300 MG capsule, Take 1 capsule by mouth 3 (Three) Times a Day. Start taking QHS x 3 days, then BID x 3 days then TID, Disp: 90 capsule, Rfl: 1  •  metoprolol succinate XL (Toprol XL) 25 MG 24 hr tablet, 12.5 mg., Disp: , Rfl:   •  NON FORMULARY, Topology-XL one half tab daily, Disp: , Rfl:   •  pantoprazole (PROTONIX) 40 MG EC tablet, Take 1 tablet by mouth 2 (Two) Times a Day., Disp: 180 tablet, Rfl: 1  •  Syringe/Needle, Disp, (B-D 3CC LUER-KISHA SYR 25GX1\") 25G X 1\" 3 ML misc, USE AS DIRECTED EVERY MONTH, Disp: 10 each, Rfl: 1  •  tamsulosin (FLOMAX) 0.4 MG capsule 24 hr capsule, TAKE 1 CAPSULE BY MOUTH AT BEDTIME, " "Disp: 90 capsule, Rfl: 0  •  thiamine (VITAMIN B-1) 100 MG tablet  tablet, Take 1 tablet by mouth Daily., Disp: , Rfl:   •  cyclobenzaprine (FLEXERIL) 10 MG tablet, Take 1 tablet by mouth 3 (Three) Times a Day As Needed for Muscle Spasms., Disp: 90 tablet, Rfl: 0  •  lidocaine (LIDODERM) 5 %, Place 1 patch on the skin as directed by provider Daily. Remove & Discard patch within 12 hours or as directed by MD, Disp: 30 each, Rfl: 0  •  multivitamin (THERAGRAN) tablet tablet, Take 1 tablet by mouth Daily., Disp: 30 tablet, Rfl:   •  nicotine (NICODERM CQ) 14 MG/24HR patch, Place 1 patch on the skin as directed by provider Daily., Disp: , Rfl:   •  polyethylene glycol (MIRALAX) 17 g packet, Take 17 g by mouth Daily As Needed (constipation)., Disp:  , Rfl:   •  traMADol (ULTRAM) 50 MG tablet, Take 1 tablet by mouth Every 8 (Eight) Hours As Needed for Moderate Pain ., Disp: 30 tablet, Rfl: 0      No Known Allergies      /76   Pulse 88   Temp 96.5 °F (35.8 °C)   Ht 177.8 cm (70\")   Wt 80.6 kg (177 lb 12.8 oz)   SpO2 96%   BMI 25.51 kg/m²       Physical Exam:  Constitutional: Patient appears well-developed, well-nourished, well-hydrated  HEENT: Head: Normocephalic and atraumatic  Eyes: Conjunctivae and lids are normal  Pupils: Equal, round, reactive to light  Peripheral vascular exam: Femoral: right 2+, left 2+. Posterior tibialis: right 1+ and left 1+. Dorsalis pedis: right 1+ and left 1+. 1+ Edema LT>RT. Presence of varicose veins.  Musculoskeletal   Gait and station: Gait evaluation demonstrated shuffling. Walks with a walker   Lumbar spine: Passive and active range of motion are limited secondary to pain. Extension, lateral flexion, rotation of the lumbar spine increased and reproduced pain. Lumbar facet joint loading maneuvers are positive.  Ar test, Gaenslen's test, thigh thrust test: Deferred. SI compression test and Yeoman's test are positive on the left   Piriformis maneuvers are negative "   Palpation of the bilateral greater trochanter, unrevealing   Examination of the Iliotibial band: unrevealing   Hip joints: The range of motion of the right hip joint is 100 degrees of flexion, limited internal and external rotation although without pain.  Assessment of the range of motion of the left hip is limited secondary to recent left hip surgery.  Patient denies pain with active range of motion   Neurological:   Patient is alert and oriented to person, place, and time.   Speech: Normal.   Cortical function: Normal mental status.   Reflex Scores:  Right patellar: 0+  Left patellar: 0+  Right Achilles: 0+  Left Achilles: 0+  Motor strength: 5/5  Motor Tone: Normal  Involuntary movements: None.   Superficial/Primitive Reflexes: Primitive reflexes were absent.   Right Fall: Absent  Left Fall: Absent  Right ankle clonus: Absent  Left ankle clonus: Absent   Babinsky: Absent  Long tract signs: Negative. Straight leg raising test: Negative.   Sensory exam: Intact to light touch, intact pain and temperature sensation, intact vibration sensation and normal proprioception.   Coordination: Normal finger to nose and heel to shin. Normal balance and negative Romberg's sign   Skin and subcutaneous tissue: Skin is warm and intact. No rash noted. No cyanosis.   Psychiatric: Judgment and insight: Normal. Recent and remote memory: Intact. Mood and affect: Normal.     ASSESSMENT:   1. Lumbar stenosis with neurogenic claudication    2. Lumbosacral disc herniation    3. Spondylosis of lumbar region without myelopathy or radiculopathy    4. Sacroiliac joint dysfunction of left side    5. History of compression fracture of spine    6. History of hemiarthroplasty of left hip    7. Memory loss    8. Stage 3 chronic kidney disease, unspecified whether stage 3a or 3b CKD (HCC)    9. Peripheral polyneuropathy    10. Alcohol abuse    11. Physical deconditioning    12. Gait disturbance    13. At high risk for falls    14. Tobacco  abuse    15. Encounter for smoking cessation counseling          PLAN/MEDICAL DECISION MAKING:   Patient reports a longstanding history of back pain.  Patient has a history of T9 and T12 compression deformity that were treated conservatively.  He presents with a several month history of lower back pain radiating into the left gluteal region, which began without incident.  Initially, he experienced some radiation into the left lower extremity.  MRI of the lumbar spine without contrast on 10/25/2021 revealed lumbar facet arthropathy. There is a disc herniation at L4-L5. Patient has failed to obtain pain relief with conservative measures including oral analgesics, physical therapy, chiropractic therapy, to name a few. Ubaldo Mcleod underwent neurosurgical consultation with Samantha Pozo PA-C on 12/17/2021, and was found not to be a surgical candidate at that time. Patient underwent  left hip hemiarthroplasty with Dr. Brenden Sharp on 12/29/2021 for treatment of displaced femoral neck fracture that occurred after falling.  At admission at [facility], his alcohol level was significantly elevated.  He was transferred to [facility] for his rehabilitation on 01/01/2022 and stayed there until 1/6/2022.  For the past several weeks, pain has centralized.  It appears that the pain is multifactorial with a potential source of left sacroiliac pain and some mechanical back pain that became more prominent after his fall.  Pain has progressed in intensity over the past months. Patient has failed to obtain pain relief with conservative measures, as referenced above.  Patient presents with multiple medical comorbidities contributing to his overall pain experience but also placing him at risk for future injuries.  I discussed with the patient and his wife my concerns with this alcohol abuse and tobacco dependence.  I have reviewed all available patient's medical records as well as previous therapies as referenced above. I had a lengthy  conversation with . Ubaldo Mcleod regarding his chronic pain condition and potential therapeutic options including risks, benefits, alternative therapies, to name a few. Therefore, I have proposed the following plan:  1. Diagnostic studies:  A. Lumbar spine X-rays, full views including flexion and extension  B. CBC, PT, PTT  2. Pharmacological measures: Reviewed and discussed; Patient takes celecoxib, gabapentin, oxycodone PRN, cyclobenzaprine PRN, duloxetine.   3. Interventional pain management measures: Patient will be scheduled for diagnostic and therapeutic left L4-L5 and left L5-S1 transforaminal epidural steroid injections. We may repeat epidurals depending on patient's outcome.  I have also discussed the possibility of lumbar facet joint injections and left sacroiliac joint injection as he also presents with significant mechanical pain that increased after his fall.  Other options would include consideration for neuromodulation techniques such as a spinal cord stimulator trial for which he will need psychological clearance.  Patient has been found not to be a surgical candidate.  4. Long-term rehabilitation efforts:  A. The patient has a history of falls. I did complete a risk assessment for falls. Fall precautions: Patient has been instructed regarding universal fall precautions, such as;   · Using gait aids a walker at the appropriate height at all times for ambulation   · Removing all area rugs and coffee tables to create a safe environment at home  · Ensure clean, dry floors  · Wearing supportive footwear and properly fitting clothing  · Ensure bed/chair is appropriate height and patient's feet can touch the floor  · Using a shower transfer bench  · Using walk-in shower and having shower safety bars installed  · Ensure proper lighting, minimize glare  · Have nightlights operational and in use  · Participation in an exercise program for gait training, balance training and strength  · Ensure proper  compliance and organization of medications to avoid errors   · Avoid use of over the counter sedatives and alcohol consumption  · Ensure easy access to call bell, glasses, TV control, telephone  · Ensure glasses/hearing aids are in use or close by (on top of night table)  B. Patient will start a comprehensive physical therapy program at [facility] for core strengthening, gait and balance training, neurodynamics and ALTER-G   C. Start an exercise program such as water therapy  D. Contrast therapy: Apply ice-packs for 15-20 minutes, followed by heating pads for 15-20 minutes to affected area   E. Referral to Dr. Aron Corbin for psychological evaluation, psychological screening for spinal cord stimulation and intrathecal therapies, cognitive behavioral therapy and facilitate treatment for patient's alcohol dependence.  F. Patient has been screened for tobacco use: Current tobacco user. Smoking Cessation: I have advised the patient at length regarding the long-term deleterious effects of smoking and have provided the patient lifestyle modification strategies to facilitate smoking cessation.  Patient failed trials with Chantix and patches, although the strength of the patches was low for the number of cigarettes he smokes.  Patient's wife reports that he smoking about a pack and a half a day.  Start nicotine patches 21 mg one patch daily every morning + nicotine patches 14 mg every afternoon for 4 weeks, then Nicotine patches 21 mg every afternoon for 4 weeks, then, nicotine patches 14 mg one patch daily for 4 weeks, then Nicotine patches 7 mg one patch daily for 2 weeks, then, discontinue. Patient has been instructed to cut down or stop as he starts nicotine replacement therapy. Time spent counselin minutes.  5. The patient and his family have been instructed to contact my office with any questions or difficulties. The patient understands the plan and agrees to proceed accordingly.    The patient  has a documented plan of care to address chronic pain.   Ubaldo Mcleod reports a pain score of  9/10.  Given his pain assessment as noted, treatment options were discussed and the following options were decided upon as a follow-up plan to address the patient's pain: continuation of current treatment plan for pain, educational materials on pain management, home exercises and therapy, prescription for non-opiod analgesics, referral to Physical Therapy, referral to Primary Care for assistance in pain treatment guidance, referral to specialist for assistance in pain treatment guidance, use of non-medical modalities (ice, heat, stretching and/or behavior modifications) and interventional pain management measures.  YUSRA query complete. YUSRA reviewed by Aamir Manuel MD.              Pain Management Panel    There is no flowsheet data to display.              Pain Medications             aspirin  MG tablet Take 1 tablet by mouth Daily.    celecoxib (CeleBREX) 200 MG capsule TAKE 1 CAPSULE BY MOUTH TWICE DAILY    DULoxetine (CYMBALTA) 30 MG capsule TAKE ONE CAPSULE BY MOUTH DAILY    gabapentin (NEURONTIN) 300 MG capsule Take 1 capsule by mouth 3 (Three) Times a Day. Start taking QHS x 3 days, then BID x 3 days then TID    cyclobenzaprine (FLEXERIL) 10 MG tablet Take 1 tablet by mouth 3 (Three) Times a Day As Needed for Muscle Spasms.    traMADol (ULTRAM) 50 MG tablet Take 1 tablet by mouth Every 8 (Eight) Hours As Needed for Moderate Pain .             Patient Care Team:  Best Garcia DO as PCP - General (Family Medicine)  Best Garcia DO as Referring Physician (Family Medicine)     No orders of the defined types were placed in this encounter.        Future Appointments   Date Time Provider Department Center   1/24/2022 11:30 AM Best Garcia DO [facility]   1/27/2022 11:30 AM Glenn Stubbs MD [facility]   1/28/2022 11:15 AM Liz Pozo PA-C [facility]   3/29/2022  8:00 AM Damaris  Neno MAHER MD [facility]         Aamir Manuel MD     Please note that portions of this note were completed with a voice recognition program.

## 2022-01-19 NOTE — TELEPHONE ENCOUNTER
Provider:  Rodríguez  Caller:  Automated refill request  Surgery:  NA  Surgery Date:    Last visit:  Office Visit with Liz Pozo PA-C (12/17/2021)    Next visit: 01/28/22    Reason for call:         Requested Prescriptions     Pending Prescriptions Disp Refills   • celecoxib (CeleBREX) 200 MG capsule [Pharmacy Med Name: CELECOXIB 200MG CAPSULES] 60 capsule 2     Sig: TAKE 1 CAPSULE BY MOUTH TWICE DAILY

## 2022-01-20 PROBLEM — Z87.81 HISTORY OF COMPRESSION FRACTURE OF SPINE: Status: ACTIVE | Noted: 2022-01-01

## 2022-01-20 PROBLEM — M48.062 LUMBAR STENOSIS WITH NEUROGENIC CLAUDICATION: Status: ACTIVE | Noted: 2022-01-01

## 2022-01-20 PROBLEM — Z91.81 AT HIGH RISK FOR FALLS: Status: ACTIVE | Noted: 2022-01-01

## 2022-01-20 PROBLEM — Z96.642 HISTORY OF HEMIARTHROPLASTY OF LEFT HIP: Status: ACTIVE | Noted: 2022-01-01

## 2022-01-20 PROBLEM — M51.27 LUMBOSACRAL DISC HERNIATION: Status: ACTIVE | Noted: 2022-01-01

## 2022-01-20 PROBLEM — R53.81 PHYSICAL DECONDITIONING: Status: ACTIVE | Noted: 2022-01-01

## 2022-01-20 PROBLEM — M54.17 LUMBOSACRAL RADICULOPATHY DUE TO INTERVERTEBRAL DISC DISORDER: Status: ACTIVE | Noted: 2022-01-01

## 2022-01-20 PROBLEM — M53.3 SACROILIAC JOINT DYSFUNCTION OF LEFT SIDE: Status: ACTIVE | Noted: 2022-01-01

## 2022-01-20 PROBLEM — R26.9 GAIT DISTURBANCE: Status: ACTIVE | Noted: 2022-01-01

## 2022-01-20 PROBLEM — Z71.6 ENCOUNTER FOR SMOKING CESSATION COUNSELING: Status: ACTIVE | Noted: 2022-01-01

## 2022-01-20 PROBLEM — M47.816 SPONDYLOSIS OF LUMBAR REGION WITHOUT MYELOPATHY OR RADICULOPATHY: Status: ACTIVE | Noted: 2022-01-01

## 2022-01-20 NOTE — PROGRESS NOTES
A MY-CHART MESSAGE HAS BEEN SENT TO THE PATIENT FOR PATIENT ROUNDING WITH [facility].

## 2022-01-24 PROBLEM — R00.0 TACHYCARDIA: Status: ACTIVE | Noted: 2022-01-01

## 2022-01-24 NOTE — PROGRESS NOTES
Follow Up Office Visit      Patient Name: Ubaldo Mcleod  : 1950   MRN: [id number]     Chief Complaint:    Chief Complaint   Patient presents with   • Hip Injury   • Rapid Heart Rate       History of Present Illness: Uabldo Mcleod is a 71 y.o. male who is here today to follow up with hip fracture that occurred at the end of last year.  And is also here with tachycardia that has improved.  Patient is on metoprolol 12.5 mg once a day.  Patient says this started this on him at physical rehab because of some concern for alcohol withdrawal.  Patient was discharged from the hospital toward the end of last year and was discharged from physical rehab over 2 weeks ago.  Patient does not qualify for TCM visit today due to the extended amount of time for follow-up.  Patient is doing well with his hip fracture.  He is mobilizing well and doing physical therapy.  He has follow-up with Ortho last week.  Patient has no concerns for infection or pain in his left hip.  He is currently being treated at the pain clinic for his lower back pain.    Patient is also here with tachycardia that was being treated with metoprolol.  This was a new medication that was started at physical rehab.  Today he would like to stop it because he did not have any symptoms from his fast heart rate.  Patient denies a history of atrial fibrillation.    Review of systems was positive for tachycardia      Physical exam: Patient's heart exam showed normal rate and rhythm.  Patient's lung exam showed poor air movement but no rales or rhonchi.  Patient using wheelchair today for ambulation.          Subjective        I have reviewed and the following portions of the patient's history were updated as appropriate: past family history, past medical history, past social history, past surgical history and problem list.    Medications:     Current Outpatient Medications:   •  aspirin  MG tablet, Take 1 tablet by mouth Daily., Disp: ,  "Rfl:   •  celecoxib (CeleBREX) 200 MG capsule, TAKE 1 CAPSULE BY MOUTH TWICE DAILY, Disp: 60 capsule, Rfl: 2  •  cyclobenzaprine (FLEXERIL) 10 MG tablet, Take 1 tablet by mouth 3 (Three) Times a Day As Needed for Muscle Spasms., Disp: 90 tablet, Rfl: 0  •  docusate sodium (COLACE) 100 MG capsule, Take 100 mg by mouth 2 (Two) Times a Day., Disp: , Rfl:   •  DULoxetine (CYMBALTA) 30 MG capsule, TAKE ONE CAPSULE BY MOUTH DAILY, Disp: 60 capsule, Rfl: 0  •  folic acid (FOLVITE) 1 MG tablet, TAKE 1 TABLET BY MOUTH EVERY DAY, Disp: 90 tablet, Rfl: 3  •  gabapentin (NEURONTIN) 300 MG capsule, Take 1 capsule by mouth 3 (Three) Times a Day. Start taking QHS x 3 days, then BID x 3 days then TID, Disp: 90 capsule, Rfl: 1  •  lidocaine (LIDODERM) 5 %, Place 1 patch on the skin as directed by provider Daily. Remove & Discard patch within 12 hours or as directed by MD, Disp: 30 each, Rfl: 0  •  multivitamin (THERAGRAN) tablet tablet, Take 1 tablet by mouth Daily., Disp: 30 tablet, Rfl:   •  pantoprazole (PROTONIX) 40 MG EC tablet, Take 1 tablet by mouth 2 (Two) Times a Day., Disp: 180 tablet, Rfl: 1  •  polyethylene glycol (MIRALAX) 17 g packet, Take 17 g by mouth Daily As Needed (constipation)., Disp:  , Rfl:   •  Syringe/Needle, Disp, (B-D 3CC LUER-KISHA SYR 25GX1\") 25G X 1\" 3 ML misc, USE AS DIRECTED EVERY MONTH, Disp: 10 each, Rfl: 1  •  tamsulosin (FLOMAX) 0.4 MG capsule 24 hr capsule, TAKE 1 CAPSULE BY MOUTH AT BEDTIME, Disp: 90 capsule, Rfl: 0  •  thiamine (VITAMIN B-1) 100 MG tablet  tablet, Take 1 tablet by mouth Daily., Disp: , Rfl:   •  traMADol (ULTRAM) 50 MG tablet, Take 1 tablet by mouth Every 8 (Eight) Hours As Needed for Moderate Pain ., Disp: 30 tablet, Rfl: 0    Allergies:   No Known Allergies    Objective     Physical Exam: Please see above  Vital Signs:   Vitals:    01/24/22 1126   BP: 120/80   Pulse: 88   Temp: 98.2 °F (36.8 °C)   SpO2: 100%   Weight: 74.4 kg (164 lb)   Height: 177.8 cm (70\")   PainSc:   9 "   PainLoc: Back     Body mass index is 23.53 kg/m².          Assessment / Plan      Assessment/Plan:   Diagnoses and all orders for this visit:    1. Tachycardia (Primary)    2. Closed fracture of left hip with routine healing, subsequent encounter      Patient's hip fracture is healing well and he is doing well with rehab.  Patient should follow-up with orthopedics as scheduled.    For patient's tachycardia, I will stop metoprolol today.  If patient has concerns for fast heart rate in the future then patient should return to discuss symptoms and we can possibly get a Holter monitor.    Follow-up in 6 months for annual wellness and repeat labs.    Follow Up:   Return in about 6 months (around 7/24/2022).    Best Garcia DO  [facility] [place]

## 2022-01-27 NOTE — TELEPHONE ENCOUNTER
Called pt regarding his XR results, and also to see how he was doing after his procedure yesterday. Pt stated they are doing well, with no complaints. Advised of f/u appointment.     Advised pt:  The X-rays showed some compression deformities at L4 and L5 (in your lower back) that were not there when you had the MRI in 10/2021 that showed the compression fractures in your thoracic spine. So, it could have occurred any time after your MRI in October -most likely during some of your falls-.  The reason I ordered the X-rays was to assess the alignment of the lumbar spine, and that was normal.       Pt understood and no further needs were expressed.

## 2022-01-27 NOTE — PROGRESS NOTES
[facility] Male Office Visit      Patient Name: Ubaldo Mcleod  : 1950   MRN: [id number]     Chief Complaint: Lower Urinary Tract Symptoms.     Referring Provider: Neno Ocampo MD    History of Present Illness: Mr. Mcleod is a 71 y.o. male with history of lower urinary tract symptoms.  Patient has a past medical history significant for hypertension, tachycardia, peptic ulcer disease, hepatitis C, GERD, stage III kidney disease, lower urinary tract symptoms, hip fracture, lumbar stenosis, tobacco abuse.  The patient seen for evaluation regarding urinary tract symptoms including urinary urgency and urge associated incontinence.  Patient has had multiple recent orthopedic surgeries after hip fracture and reports worsening of urinary symptoms following orthopedic procedure.  She denies intermittency, hesitancy, mild weakening of stream.  Does report urinary frequency, urgency and urge associated incontinence.  Reports worsened nighttime urinary symptoms.  PVR today 108 mL.  Denies hematuria.  Denies prior history of urinary tract infection.    Previous treatments include: Alpha-blocker therapy      Subjective      Review of System: Review of Systems   Constitutional: Negative for chills, fatigue, fever and unexpected weight change.   HENT: Negative for sore throat.    Eyes: Negative for visual disturbance.   Respiratory: Negative for cough, chest tightness and shortness of breath.    Cardiovascular: Negative for chest pain and leg swelling.   Gastrointestinal: Negative for blood in stool, constipation, diarrhea, nausea, rectal pain and vomiting.   Genitourinary: Positive for frequency and urgency. Negative for decreased urine volume, difficulty urinating, dysuria, enuresis, flank pain, genital sores and hematuria.   Musculoskeletal: Positive for back pain and joint swelling.   Skin: Negative for rash and wound.   Neurological: Positive for numbness. Negative for seizures, speech difficulty,  weakness and headaches.   Psychiatric/Behavioral: Negative for confusion, sleep disturbance and suicidal ideas. The patient is not nervous/anxious.       I have reviewed the ROS documented by my clinical staff, updated appropriate and I agree. Glenn Stubbs MD    Past Medical History:  Past Medical History:   Diagnosis Date   • Chronic hepatitis C with cirrhosis (HCC)    • ED (erectile dysfunction) of non-organic origin    • GERD (gastroesophageal reflux disease)    • Hemochromatosis    • History of cirrhosis    • Homocysteinemia    • Porphyria cutanea tarda (HCC)    • Weight loss        Past Surgical History:  Past Surgical History:   Procedure Laterality Date   • CATARACT EXTRACTION WITH INTRAOCULAR LENS IMPLANT      dec 2016 - has 3 surgries on left eye and 1 on the right eye.    • COLONOSCOPY  2016   • ENDOSCOPY  2016   • EYE SURGERY     • HIP HEMIARTHROPLASTY Left 12/29/2021    Procedure: HIP HEMIARTHROPLASTY LEFT;  Surgeon: Brenden Sharp Jr., MD;  Location: [facility];  Service: Orthopedics;  Laterality: Left;   • HIP TROCHANTERIC NAILING WITH INTRAMEDULLARY HIP SCREW Right 1/28/2021    Procedure: HIP TROCHANTERIC NAILING WITH INTRAMEDULLARY HIP SCREW RIGHT;  Surgeon: Brenden Sharp Jr., MD;  Location: [facility] OR;  Service: Orthopedics;  Laterality: Right;   • LIVER BIOPSY     • REFRACTIVE SURGERY  06/2017    follow up surgery on both eyes        Medications:    Current Outpatient Medications:   •  celecoxib (CeleBREX) 200 MG capsule, TAKE 1 CAPSULE BY MOUTH TWICE DAILY, Disp: 60 capsule, Rfl: 2  •  cyclobenzaprine (FLEXERIL) 10 MG tablet, Take 1 tablet by mouth 3 (Three) Times a Day As Needed for Muscle Spasms., Disp: 90 tablet, Rfl: 0  •  docusate sodium (COLACE) 100 MG capsule, Take 100 mg by mouth 2 (Two) Times a Day., Disp: , Rfl:   •  DULoxetine (CYMBALTA) 30 MG capsule, TAKE ONE CAPSULE BY MOUTH DAILY, Disp: 60 capsule, Rfl: 0  •  folic acid (FOLVITE) 1 MG tablet, TAKE 1 TABLET BY MOUTH EVERY DAY,  "Disp: 90 tablet, Rfl: 3  •  gabapentin (NEURONTIN) 300 MG capsule, Take 1 capsule by mouth 3 (Three) Times a Day. Start taking QHS x 3 days, then BID x 3 days then TID, Disp: 90 capsule, Rfl: 1  •  lidocaine (LIDODERM) 5 %, Place 1 patch on the skin as directed by provider Daily. Remove & Discard patch within 12 hours or as directed by MD, Disp: 30 each, Rfl: 0  •  multivitamin (THERAGRAN) tablet tablet, Take 1 tablet by mouth Daily., Disp: 30 tablet, Rfl:   •  pantoprazole (PROTONIX) 40 MG EC tablet, Take 1 tablet by mouth 2 (Two) Times a Day., Disp: 180 tablet, Rfl: 1  •  Syringe/Needle, Disp, (B-D 3CC LUER-KISHA SYR 25GX1\") 25G X 1\" 3 ML misc, USE AS DIRECTED EVERY MONTH, Disp: 10 each, Rfl: 1  •  tamsulosin (FLOMAX) 0.4 MG capsule 24 hr capsule, TAKE 1 CAPSULE BY MOUTH AT BEDTIME, Disp: 90 capsule, Rfl: 0  •  thiamine (VITAMIN B-1) 100 MG tablet  tablet, Take 1 tablet by mouth Daily., Disp: , Rfl:   •  traMADol (ULTRAM) 50 MG tablet, Take 1 tablet by mouth Every 8 (Eight) Hours As Needed for Moderate Pain ., Disp: 30 tablet, Rfl: 0  •  aspirin  MG tablet, Take 1 tablet by mouth Daily., Disp: , Rfl:   •  polyethylene glycol (MIRALAX) 17 g packet, Take 17 g by mouth Daily As Needed (constipation)., Disp:  , Rfl:     Allergies:  No Known Allergies    Social History:  Social History     Socioeconomic History   • Marital status:    Tobacco Use   • Smoking status: Current Every Day Smoker     Packs/day: 1.50     Years: 0.00     Pack years: 0.00     Types: Cigarettes     Start date: 1966   • Smokeless tobacco: Never Used   • Tobacco comment:  · 1 - 1 1/2 PACKS PER DAY, FOR THE PAST 50 YEARS   Vaping Use   • Vaping Use: Never used   Substance and Sexual Activity   • Alcohol use: Yes     Comment: 3-4 glasses of bourbon every night    • Drug use: Never   • Sexual activity: Defer     Partners: Female       Family History:  Family History   Problem Relation Age of Onset   • Cancer Mother    • No Known Problems " "Father    • No Known Problems Other    • Cancer Sister    • Diabetes Brother    • Stroke Brother          Post void residual bladder scan:   108mL     Objective     Physical Exam:   Vital Signs:   Vitals:    01/27/22 1145   Pulse: 97   SpO2: 100%   Weight: 74.4 kg (164 lb)   Height: 177.8 cm (70\")   PainSc:   4     Body mass index is 23.53 kg/m².     Physical Exam  Vitals and nursing note reviewed.   Constitutional:       Appearance: Normal appearance. He is normal weight.   Cardiovascular:      Comments: Well-perfused  Pulmonary:      Effort: Pulmonary effort is normal.   Abdominal:      General: Abdomen is flat.      Palpations: Abdomen is soft.   Musculoskeletal:         General: Normal range of motion.   Skin:     General: Skin is warm and dry.   Neurological:      General: No focal deficit present.      Mental Status: He is alert and oriented to person, place, and time. Mental status is at baseline.   Psychiatric:         Mood and Affect: Mood normal.         Behavior: Behavior normal.         Thought Content: Thought content normal.         Judgment: Judgment normal.           Labs:   Lab Results   Component Value Date    PSA 0.758 03/24/2021    PSA 1.340 08/08/2019    PSA 0.700 06/18/2018       Brief Urine Lab Results     None               Lab Results   Component Value Date    GLUCOSE 91 01/01/2022    CALCIUM 8.6 01/01/2022     01/01/2022    K 4.2 01/01/2022    CO2 27.0 01/01/2022     01/01/2022    BUN 8 01/01/2022    CREATININE 0.63 (L) 01/01/2022    EGFRIFNONA 126 01/01/2022    BCR 12.7 01/01/2022    ANIONGAP 8.0 01/01/2022       Lab Results   Component Value Date    WBC 8.01 01/24/2022    HGB 10.5 (L) 01/24/2022    HCT 30.9 (L) 01/24/2022    MCV 98.1 (H) 01/24/2022     01/24/2022       Images:   No recent intra-abdominal imaging available for review    Measures:   Tobacco:   Ubaldo Mcleod  reports that he has been smoking cigarettes. He started smoking about 56 years ago. He has " been smoking about 1.50 packs per day for the past 0.00 years. He has never used smokeless tobacco.. I have educated him on the risk of diseases from using tobacco products    Assessment / Plan      Assessment:  Mr. Mcleod is a 71 y.o. male who presents with lower urinary tract symptoms.  Patient has a past medical history significant for Patient has a past medical history significant for hypertension, tachycardia, peptic ulcer disease, hepatitis C, GERD, stage III kidney disease, lower urinary tract symptoms, hip fracture, lumbar stenosis, tobacco abuse.  The patient seen for evaluation regarding urinary tract symptoms including urinary urgency and urge associated incontinence.  Patient has had multiple recent orthopedic surgeries after hip fracture and reports worsening of urinary symptoms following orthopedic procedure.  PVR today 108 mL.  He has been initiated on alpha-blocker therapy per primary care without significant improvement in symptoms.    Diagnoses and all orders for this visit:    1. Urinary urgency    2. Urge incontinence         Patient Education:   Today we discussed the etiology and management of lower urinary tract symptoms.    Discussed work-up including history and physical exam.  We discussed his PVR today is 108 mL.  We discussed conservative management strategies to improve urinary symptoms.  We discussed p.o. medical management strategies to improve urinary symptoms.  Based upon the patient's continued and worsening symptoms on p.o. medication we discussed the indication for cystoscopy and transrectal ultrasound the prostate for volume.  We discussed evaluation for potential bladder outlet procedure based upon his worsening symptoms improved but occasions and symptom bother.  We discussed the etiology of obstructive and irritative/storage symptoms.  We will complete work-up and continue to provide counseling based upon findings.  The patient will return in approximately 2 to 3 weeks for  further work-up and evaluation.  He is understanding and agreeable plan of care.    Follow Up:   Return in about 4 weeks (around 2/24/2022) for Follow up for Cystoscopy.    I spent approximately 45 minutes providing clinical care for this patient; including review of patient's chart and provider documentation, face to face time spent with patient in examination room (obtaining history, performing physical exam, discussing diagnosis and management options), placing orders, and completing patient documentation.     Glenn Stubbs MD  [facility] [place] [facility] [place]

## 2022-01-28 PROBLEM — R39.15 URINARY URGENCY: Status: ACTIVE | Noted: 2022-01-01

## 2022-01-28 NOTE — PROGRESS NOTES
A [employer] message has been sent to the patient for PATIENT ROUNDING with [facility].

## 2022-02-01 NOTE — TELEPHONE ENCOUNTER
Provider:  Liz BAY  Caller: Patient  Time of call: 12:31    Phone #:  [phone]  Surgery:    Surgery Date:    Last visit:12/17/21     Next visit:     YUSRA:         Reason for call:  Patient called to give Liz BAY an update.    He had an injection with Dr. Manuel, but only got relief for 2 days.     His first physical therapy apt is on Monday.  He wants to know if he can come in to see you again if Physical therapy is not helpful?

## 2022-02-01 NOTE — TELEPHONE ENCOUNTER
Yes of course. Happy to see back. Lets give PT a couple weeks to see if it helps, if not he can f/u with me or lee

## 2022-02-07 NOTE — TELEPHONE ENCOUNTER
Patient's wife LVM on clinical line stating that [facility] told the patient to call back 2 weeks after receiving injections to let her know how he is doing. From looking at previous encounter-Sánchez wanted patient to call back after 2 weeks of starting PT. I called pt and relayed this information to the patient and his wife (on speakerphone). Patient states that he hasn't started PT-he is starting this next week. I re-iterated to patient that Sánchez wanted patient to call 2 weeks after PT. Patient's wife states that Sánchez wanted patient to call 2 weeks after injections. Which looks like it was addressed on 02/01/2022. I told patient and his wife that I would let Sánchez know and she might be able to give them a call. Patient and his wife voiced understanding.

## 2022-02-07 NOTE — TELEPHONE ENCOUNTER
I called  and spoke with the patient he said he was still having bad pain that was in one leg but now it is both legs. The medicine that he is taking is not helping, so the patient has started taking oxycodone.       The injections helped for a couple of days and then the pain comes back. He wants to come back in to see what can be done. Please Advise. Thank you.    Please Call Bhavna his wife with an appointment her number is [phone].  Thank you.

## 2022-02-08 NOTE — TELEPHONE ENCOUNTER
"I spoke to the patient. He said he could try to get here tomorrow for a visit, but then he passed the phone to his spouse. She said there is no way she could get him here. He cannot walk and she's not physically able to get him into a vehicle.     I mentioned the possibility of a televisit. She asked if Sánchez COSTELLO could call him today. She said if not, due to his high level of pain and symptoms, she may have to call EMS to take him to the ER. She said \"something has to be done about this\".  "

## 2022-02-08 NOTE — TELEPHONE ENCOUNTER
Provider: Rodríguez/Liz BAY   Caller: patient  Time of call:   1:09  Phone #:  [phone]  Surgery:    Surgery Date:    Last visit:  12/17/21   Next visit:     YUSRA:         Patient called and asked to speak with Liz BAY.  Calling patient to get more information.    When did it start:Severe for the last 2 days.    Where is it located:Bilateral leg pain, starts in his hips-and low back pain that runs down both extremities.     How long has this been going on/is this new or the same as before surgery: N/A    Characteristics of symptom/severity:Sharp shooting pain when he tries to stand up or when he's sitting.    Timing- Is it constant or intermittent:Constant, unless he's laying down.    What makes it worse:Sitting/Standing    What makes it better:Oxycodone every 8 hours/Laying down.    What therapies/medications have you tried:Patient states he has to lay down because he cannot walk without severe pain. He has been using a urinal so he does not have to get up.  He has not had a B/M in 2 days.    He denies saddle numbness or any cauda equina symptoms.

## 2022-02-08 NOTE — TELEPHONE ENCOUNTER
Called and spoke with patient and his wife.  He had an epidural steroid injection on 1/27.  He was doing very well up until about 3-4 days ago.  Since that time, he has had progressive severe pain radiating into his bilateral lower extremities and bilateral thighs.  Pain is so severe that he has difficulty with weightbearing.  He denies focal weakness.  He is able to lift his legs up off the bed.  He can plantar and dorsiflex to resistance.  He has some numbness/tingling in his feet.  Denies lower extremity, abdominal or upper extremity numbness/tingling.  Denies saddle anesthesia.  He has some urinary difficulties for which he follows with urology.  Denies katina incontinence or bowel incontinence.    I discussed options with the patient and his wife.  I offered to see them in the office tomorrow.  The wife states she cannot get him into the car and get him to the office secondary to his difficulty with ambulating due to pain.  I discussed with him that if he truly has intractable pain and is unable to ambulate, I would recommend he present to the emergency department for evaluation.  They state they would like to avoid the ED tonight.  I again discussed my recommendation.  I very carefully reviewed signs/symptoms of cauda equina syndrome with them and they voiced understanding.  I have sent in a Medrol Dosepak and asked him to increase his gabapentin.  I will see him in office tomorrow if able.  Otherwise, he will present to ED for evaluation.

## 2022-02-09 NOTE — TELEPHONE ENCOUNTER
Called to check on patient this morning.  No answer, left a voicemail.  Can someone please try again soon and get an update on his condition?

## 2022-02-09 NOTE — TELEPHONE ENCOUNTER
Provider: DR. LEW  Caller: YARELI NINO  Relationship to Patient: SPOUSE    Phone Number: [phone]  Reason for Call: PATIENT IS CURRENTLY AT THE ER FOR A SEPARATE ISSUE, BACK PAIN. PATIENT'S WIFE WANTED TO LET US KNOW THAT HE MIGHT BE ADMITTED BUT NOT SURE. SHE WILL CALL BACK EITHER LATER TODAY OR TOMORROW MORNING TO VERIFY IF PATIENT IS COMING OR NOT TO CYSTOSCOPY APPOINTMENT SCHEDULED AT 2:15 ON 2/10/22.

## 2022-02-09 NOTE — PROGRESS NOTES
Subjective     Chief Complaint: Bilateral lower extremity pain and weakness    Patient ID: Ubaldo Mcleod is a 71 y.o. male is here today for follow-up.    History of Present Illness    This is a 71-year-old gentleman that we have been following for some low back and left leg pain.  He is referred to pain management and underwent an epidural steroid injection on 1/27/2022.  He was doing well for the first few days following his injection.  Over the last 4-5 days he has had progressive pain which became excruciating yesterday.  Pain radiates into his buttocks, lateral and anterior thighs.  He is unable to bear weight secondary to pain and has required significant mobility assistance.  He was given a Medrol Dosepak and presents today for evaluation.  His symptoms are slightly improved when he is sitting, but severe with any standing or weightbearing.  He has urinary incontinence at baseline but denies any change in urinary symptoms.  Denies saddle anesthesia or bowel incontinence.    The following portions of the patient's history were reviewed and updated as appropriate: allergies, current medications, past family history, past medical history, past social history, past surgical history and problem list.    Family history:   Family History   Problem Relation Age of Onset   • Cancer Mother    • No Known Problems Father    • No Known Problems Other    • Cancer Sister    • Diabetes Brother    • Stroke Brother        Social history:   Social History     Socioeconomic History   • Marital status:    Tobacco Use   • Smoking status: Current Every Day Smoker     Packs/day: 1.50     Years: 0.00     Pack years: 0.00     Types: Cigarettes     Start date: 1966   • Smokeless tobacco: Never Used   • Tobacco comment:  · 1 - 1 1/2 PACKS PER DAY, FOR THE PAST 50 YEARS   Vaping Use   • Vaping Use: Never used   Substance and Sexual Activity   • Alcohol use: Yes     Comment: 3-4 glasses of bourbon every night    • Drug use:  Never   • Sexual activity: Defer     Partners: Female       Review of Systems   Constitutional: Negative for activity change, appetite change, chills, diaphoresis, fatigue, fever and unexpected weight change.   HENT: Negative for congestion, dental problem, drooling, ear discharge, ear pain, facial swelling, hearing loss, mouth sores, nosebleeds, postnasal drip, rhinorrhea, sinus pressure, sinus pain, sneezing, sore throat, tinnitus, trouble swallowing and voice change.    Eyes: Negative for photophobia, pain, discharge, redness, itching and visual disturbance.   Respiratory: Negative for apnea, cough, choking, chest tightness, shortness of breath, wheezing and stridor.    Cardiovascular: Negative for chest pain, palpitations and leg swelling.   Gastrointestinal: Negative for abdominal distention, abdominal pain, anal bleeding, blood in stool, constipation, diarrhea, nausea, rectal pain and vomiting.   Endocrine: Negative for cold intolerance, heat intolerance, polydipsia, polyphagia and polyuria.   Genitourinary: Positive for genital sores and urgency. Negative for decreased urine volume, difficulty urinating, dysuria, enuresis, flank pain, frequency and hematuria.   Musculoskeletal: Positive for back pain and myalgias. Negative for arthralgias, gait problem, joint swelling, neck pain and neck stiffness.   Skin: Negative for color change, pallor, rash and wound.   Allergic/Immunologic: Negative for environmental allergies, food allergies and immunocompromised state.   Neurological: Positive for weakness. Negative for dizziness, tremors, seizures, syncope, facial asymmetry, speech difficulty, light-headedness, numbness and headaches.   Hematological: Negative for adenopathy. Does not bruise/bleed easily.   Psychiatric/Behavioral: Negative for agitation, behavioral problems, confusion, decreased concentration, dysphoric mood, hallucinations, self-injury, sleep disturbance and suicidal ideas. The patient is not  "nervous/anxious and is not hyperactive.        Objective   Blood pressure 116/77, pulse 102, resp. rate 16, height 175.3 cm (69\"), weight 77.1 kg (170 lb).  Body mass index is 25.1 kg/m².  Patient's Body mass index is 25.1 kg/m². indicating that he is within normal range (BMI 18.5-24.9). No BMI management plan needed..      Physical Exam  Constitutional:       General: He is not in acute distress.     Appearance: Normal appearance. He is not ill-appearing, toxic-appearing or diaphoretic.      Comments: Elderly gentleman using a wheelchair for assistance today.  He appears comfortable when sitting.  With any attempt at standing he has severe pain   HENT:      Head: Normocephalic and atraumatic.   Eyes:      Conjunctiva/sclera: Conjunctivae normal.   Pulmonary:      Effort: Pulmonary effort is normal.   Musculoskeletal:      Cervical back: Neck supple.   Skin:     General: Skin is warm and dry.   Neurological:      Mental Status: He is alert.      GCS: GCS eye subscore is 4. GCS verbal subscore is 5. GCS motor subscore is 6.      Sensory: Sensory deficit present.      Motor: Weakness present.      Deep Tendon Reflexes:      Reflex Scores:       Patellar reflexes are 1+ on the right side and 1+ on the left side.       Achilles reflexes are 1+ on the right side and 1+ on the left side.     Comments: Unable to stand independently.  He has 3+/5 bilateral dorsi flexion.  Plantar flexion is 5/5.  Knee flexion and extension is 4/5, pain limited.  Clonus is absent.  Ankle reflexes are intact.  Decreased sensation over the bilateral and lateral thigh   Psychiatric:         Mood and Affect: Mood normal.         Behavior: Behavior normal.           Assessment/Plan       This is a 71-year-old gentleman who underwent epidural steroid injection on 1/27/22.  Over the last 5 days he has had progressive, severe bilateral lower extremity pain and inability to weight-bear secondary to weakness and pain.  Denies any saddle anesthesia or " bowel or bladder dysfunction outside of baseline.  I am concerned about an epidural hematoma.  Dr. Arreguin and I discussed options with the patient.  We could attempt to get an urgent MRI, however his wife is concerned as she is unable to lift him in and out of the car.  As such, we have recommended he present to the emergency department for further evaluation.  He will undergo an MRI with and without contrast.  Final recommendations to follow MRI results.    Diagnoses and all orders for this visit:    1. Acute bilateral low back pain with bilateral sciatica (Primary)    2. DDD (degenerative disc disease), lumbar        No follow-ups on file.             Liz Pozo PA-C

## 2022-02-10 NOTE — TELEPHONE ENCOUNTER
Called to check on patient. He left ED after waiting in waiting room. Reports pain is improved some. Hasn't gotten up yet. Can we please see about getting him scheduled for an urgent MRI lumbar w/wo. Call Suzy in MRI to see if she can add on today or early tomorrow morning please.     Thanks     Addendum:  Patient states he sat in the emergency room for 10 hours and was not seen.  So he went home.  When I called this morning, he still had pain but felt it was a little bit better.  I called MRI and was able to get him in for an urgent MRI.  A time slot was offered for him today but the patient's wife stated they were not able to make it today.  I discussed the importance of urgency.  They state they will come tomorrow for an MRI.  Suzy was able to fit them in at 11 AM.  We will review his scan and final recommendations to follow.  Signs/symptoms of cauda equina again reviewed.

## 2022-02-10 NOTE — PROGRESS NOTES
Preprocedure diagnosis  LUTS     Postprocedure diagnosis  LUTS     Procedure  Diagnostic Cystourethroscopy  Transrectal prostate ultrasound for volume     Attending surgeon  Glenn Stubbs     Anesthesia  2% lidocaine jelly intraurethrally     Complications  None     Indications  71-year-old male undergoing a flexible cystoscopy for the above mentioned indications.  Informed consent was obtained.       Findings  Cystoscopic findings included one right and left ureteral orifice in the normal orthotopic position with normal bladder mucosa and no tumors, masses or stones.   The urethral urothelium was within normal limits with no visible strictures.  The prostatic urethra revealed mild lateral lobe hypertrophy, no significant median lobe or intravesical component.   Formal volumetric calculations were performed with prostate with found to be length 32.4 mm, width 38.4 mm, height 23.6 mm.  Prostate volume 20 g     Procedure  The patient was placed in supine position and prepped and draped in sterile fashion with lidocaine jelly instill per the urethra for anesthesia 5 minutes prior to procedure start.  A brief timeout was performed including available nursing staff and the patient.  The 16 Fr digital flexible cystoscope was lubricated and gently advanced into the urethral meatus. The penile, bulbar,and membranous urethra appeared widely patent without obvious stricture or mucosal abnormality.  In the prostatic urethra there was mild lateral lobe hypertrophy.  The scope was then advanced into the bladder.  There is no intravesical component or median lobe of the prostate. The bladder was completely visualized starting with the trigone. There were bilateral orthotopic ureteral orifices. The posterior wall, lateral walls, anterior wall, and dome were completely visualized WITHOUT obvious mucosal lesions, tumors, stones.  Was trabeculation throughout the bladder. The cystoscope was retroflexed and the bladder neck and  prostate were further visualized.  The cystoscope was then gently withdrawn while visualizing the urethra and the procedure was then terminated.  The patient tolerated the procedure well.       Transrectal ultrasound probe was then atraumatically inserted per rectum.  Formal volumetric calculations were performed with prostate with found to be width 38.4 mm, height 23.6 mm, length 32.4 mm for total prostatic volume of 20 g.  Transrectal ultrasound probe was then atraumatically removed     Follow Up:         Today we discussed cystoscopy and transrectal ultrasound the prostate for volume work-up for lower urinary tract symptoms.  We discussed that based upon cystoscopy and transrectal ultrasound the prostate he is not have obvious significant obstruction.  Mild lateral lobe hypertrophy.  He does have trabeculation within the bladder.  He reports minimal obstructive urinary symptoms, most significant symptoms are irritative storage symptoms.  We discussed the indication for p.o. medical management related to overactivity of the bladder and urgency.  Based upon his history we will not start anticholinergic, he has history of constipation.  We will initiate a sample trial of 4 weeks of Myrbetriq, beta 3 agonist.  We discussed the risks and benefits of this medication.  Discussed the importance of blood pressure monitoring.  He will return in 4 weeks for a symptom check and we will evaluate symptoms on this medication.  He is understanding agreeable plan of care

## 2022-02-10 NOTE — TELEPHONE ENCOUNTER
Patient was already scheduled for an MRI on Fri. 2/11/22 at 11:00am. Do you want me to add him to your schedule tomorrow?

## 2022-02-11 PROBLEM — S32.040A CLOSED COMPRESSION FRACTURE OF L4 LUMBAR VERTEBRA, INITIAL ENCOUNTER: Status: ACTIVE | Noted: 2022-01-01

## 2022-02-11 PROBLEM — S32.050A CLOSED COMPRESSION FRACTURE OF L5 LUMBAR VERTEBRA, INITIAL ENCOUNTER (HCC): Status: ACTIVE | Noted: 2022-01-01

## 2022-02-11 NOTE — PROGRESS NOTES
Subjective     Chief Complaint: Low back pain    Patient ID: Ubaldo Mcleod is a 71 y.o. male is here today for follow-up.    History of Present Illness    This is a 71-year-old gentleman that we have followed in the past for a T9, T12 compression fractures which improved with conservative treatment.  He then presented with low back and left leg pain and was referred to pain management.  He underwent an ANGELLA on 1/27/2022.  He represented to our office on 2/9/2022 with a 5-day history of severe low back pain radiating into his bilateral buttocks and thighs.  He has been unable to bear weight secondary to pain and has significant mobility difficulties.  He has been taking oxycodone and tramadol without relief.  Denies saddle anesthesia or bowel or bladder incontinence outside at baseline.      He was referred for an urgent MRI and presents today to discuss the results of this.  Denies any changes in his symptoms since last seen in the office 2 days ago    The following portions of the patient's history were reviewed and updated as appropriate: allergies, current medications, past family history, past medical history, past social history, past surgical history and problem list.    Family history:   Family History   Problem Relation Age of Onset   • Cancer Mother    • No Known Problems Father    • No Known Problems Other    • Cancer Sister    • Diabetes Brother    • Stroke Brother        Social history:   Social History     Socioeconomic History   • Marital status:    Tobacco Use   • Smoking status: Current Every Day Smoker     Packs/day: 1.50     Years: 0.00     Pack years: 0.00     Types: Cigarettes     Start date: 1966   • Smokeless tobacco: Never Used   • Tobacco comment:  · 1 - 1 1/2 PACKS PER DAY, FOR THE PAST 50 YEARS   Vaping Use   • Vaping Use: Never used   Substance and Sexual Activity   • Alcohol use: Yes     Comment: 3-4 glasses of bourbon every night    • Drug use: Never   • Sexual activity:  Defer     Partners: Female       Review of Systems   Constitutional: Negative for activity change, appetite change, chills, diaphoresis, fatigue and fever.   Gastrointestinal: Negative for nausea and vomiting.   Genitourinary: Positive for urgency. Negative for difficulty urinating.   Musculoskeletal: Positive for back pain, gait problem and myalgias. Negative for neck pain.   Neurological: Positive for weakness. Negative for dizziness, facial asymmetry, light-headedness, numbness and headaches.   All other systems reviewed and are negative.      Objective   There were no vitals taken for this visit.  There is no height or weight on file to calculate BMI.  Patient's There is no height or weight on file to calculate BMI. indicating that he is within normal range (BMI 18.5-24.9). No BMI management plan needed..      Physical Exam  Constitutional:       General: He is not in acute distress.     Appearance: Normal appearance. He is not ill-appearing, toxic-appearing or diaphoretic.   HENT:      Head: Normocephalic and atraumatic.   Eyes:      Conjunctiva/sclera: Conjunctivae normal.   Pulmonary:      Effort: Pulmonary effort is normal.   Musculoskeletal:      Thoracic back: No bony tenderness.      Lumbar back: Bony tenderness present.      Comments: Bony tenderness in the lower lumbar spine.  No bony tenderness in the thoracic spine.   Neurological:      Mental Status: He is alert and oriented to person, place, and time.      GCS: GCS eye subscore is 4. GCS verbal subscore is 5. GCS motor subscore is 6.      Gait: Gait abnormal.      Comments: Patient is sitting in a wheelchair in no apparent distress.  He is comfortable when sitting but has severe pain in any attempt with movement.  Lower extremity strength is intact, pain limited exam.   Psychiatric:         Mood and Affect: Mood normal.         Behavior: Behavior normal.           Assessment/Plan       This is a 71-year-old gentleman with an acute L4 and L5  compression fracture.  His symptoms are severe and refractory to attempted conservative treatment.  I have reviewed his MRI with Dr. Lopez.  The patient is a candidate for an L4, L5 kyphoplasty.  Dr. Lopez discussed the procedure in detail with the patient and his wife including the risks and benefits.  All questions were answered.  We will schedule the patient on an urgent basis for L4, L5 kyphoplasty.  He was given a prescription for Norco.    Diagnoses and all orders for this visit:    1. Closed compression fracture of L4 lumbar vertebra, initial encounter ([facility]) (Primary)  -     Case Request; Standing  -     COVID PRE-OP / PRE-PROCEDURE SCREENING ORDER (NO ISOLATION) - Swab, Nasopharynx; Future  -     CBC (No Diff); Future  -     Basic Metabolic Panel; Future  -     Case Request  -     Case Request Cath Lab: IR kyphoplasty, lumbar  L4, L5    2. Closed compression fracture of L5 lumbar vertebra, initial encounter ([facility])  -     Case Request; Standing  -     COVID PRE-OP / PRE-PROCEDURE SCREENING ORDER (NO ISOLATION) - Swab, Nasopharynx; Future  -     CBC (No Diff); Future  -     Basic Metabolic Panel; Future  -     Case Request  -     Case Request Cath Lab: IR kyphoplasty, lumbar  L4, L5    Other orders  -     Follow Anesthesia Guidelines / Protocol; Future  -     Obtain informed consent  -     Provide NPO Instructions to Patient; Future  -     Chlorhexidine Skin Prep; Future        Return for Next scheduled follow up.             Liz Pozo PA-C

## 2022-02-11 NOTE — PROGRESS NOTES
Subjective     Chief Complaint: ***    Patient ID: Ubaldo Mcleod is a 71 y.o. male {Specialty - why patient here?:17017}    History of Present Illness    ***    The following portions of the patient's history were reviewed and updated as appropriate: allergies, current medications, past family history, past medical history, past social history, past surgical history and problem list.    Family history:   Family History   Problem Relation Age of Onset   • Cancer Mother    • No Known Problems Father    • No Known Problems Other    • Cancer Sister    • Diabetes Brother    • Stroke Brother        Social history:   Social History     Socioeconomic History   • Marital status:    Tobacco Use   • Smoking status: Current Every Day Smoker     Packs/day: 1.50     Years: 0.00     Pack years: 0.00     Types: Cigarettes     Start date: 1966   • Smokeless tobacco: Never Used   • Tobacco comment:  · 1 - 1 1/2 PACKS PER DAY, FOR THE PAST 50 YEARS   Vaping Use   • Vaping Use: Never used   Substance and Sexual Activity   • Alcohol use: Yes     Comment: 3-4 glasses of bourbon every night    • Drug use: Never   • Sexual activity: Defer     Partners: Female       Review of Systems   Constitutional: Negative for activity change, appetite change, chills, diaphoresis, fatigue, fever and unexpected weight change.   HENT: Negative for congestion, dental problem, drooling, ear discharge, ear pain, facial swelling, hearing loss, mouth sores, nosebleeds, postnasal drip, rhinorrhea, sinus pressure, sinus pain, sneezing, sore throat, tinnitus, trouble swallowing and voice change.    Eyes: Negative for photophobia, pain, discharge, redness, itching and visual disturbance.   Respiratory: Negative for apnea, cough, choking, chest tightness, shortness of breath, wheezing and stridor.    Cardiovascular: Negative for chest pain, palpitations and leg swelling.   Gastrointestinal: Negative for abdominal distention, abdominal pain, anal  bleeding, blood in stool, constipation, diarrhea, nausea, rectal pain and vomiting.   Endocrine: Negative for cold intolerance, heat intolerance, polydipsia, polyphagia and polyuria.   Genitourinary: Positive for genital sores and urgency. Negative for decreased urine volume, difficulty urinating, dysuria, enuresis, flank pain, frequency and hematuria.   Musculoskeletal: Positive for back pain and myalgias. Negative for arthralgias, gait problem, joint swelling, neck pain and neck stiffness.   Skin: Negative for color change, pallor, rash and wound.   Allergic/Immunologic: Negative for environmental allergies, food allergies and immunocompromised state.   Neurological: Positive for weakness. Negative for dizziness, tremors, seizures, syncope, facial asymmetry, speech difficulty, light-headedness, numbness and headaches.   Hematological: Negative for adenopathy. Does not bruise/bleed easily.   Psychiatric/Behavioral: Negative for agitation, behavioral problems, confusion, decreased concentration, dysphoric mood, hallucinations, self-injury, sleep disturbance and suicidal ideas. The patient is not nervous/anxious and is not hyperactive.        Objective   There were no vitals taken for this visit.  There is no height or weight on file to calculate BMI.  Patient's There is no height or weight on file to calculate BMI. indicating that he is {weight categories:65185}.      Physical Exam      Assessment/Plan       ***    There are no diagnoses linked to this encounter.    No follow-ups on file.             Akua White MA

## 2022-02-14 NOTE — INTERVAL H&P NOTE
H&P updated. The patient was examined and the following changes are noted:  lungs- exp wheezing, productive cough of light yellow/cream sputum. [id number]. Neuro - wakes to noxious, muscle tone intact, muscle strength with generalized weakness without focal weakness. He will speak in a soft voice but normal voice to noxious. He is less awake now than this am upon arrival.    Plan- admit for observation.    Kimberly Ortiz PA-C

## 2022-02-14 NOTE — CONSULTS
Cardiology Consult     Ubaldo Mcleod  1950  [phone]    02/14/22    DATE OF ADMISSION: 2/14/2022  [facility]    [facility],   [address] [id number]    Referring: Dr. Lopez    CC: New onset Afib     1. Atrial Fibrillation   a. CHADSVASC: 1  b. Echo: pending  c. Diagnosed in the setting of acute pain/ back surgery 2/14/22   2. Chronic kidney disease  3. Chronic Hepatitis C  4. Vertebral compression fractures  5. Current tobacco use  6. BPH  7. GERD  8. Surgical History   a. Hip fracture repair Jan 2021  b. Hip fracture repair Dec 2021     History of Present Illness:   Mr. Mcleod is a 71 y.o. male who presented today for a L4, L5 IR  kyphoplasty who was found to be in atrial fibrillation on EKG prior to the procedure this morning. He was not aware of being in AF this morning when he presented to [facility].  Last 12 lead EKG was 12/29/21 at that point he was in NSR.  He has seen several providers since then however there was not mention of Afib at any of these outpatient visits. He presented the [facility] emergency department 2/9/2022 for back pain.  He ultimately left due to the long wait time however his vital signs were taken and pulse rate was 89 that day.  He also had a cystoscopy on 2/11/2022 heart rate that day was 108.  He was also seen in neurosurgery call clinic 2/11/2022.  No vitals were taken that day.  No mention of his heart rate being irregular.  He is not sure how long he has been out of rhythm. ,Pt remains somewhat sedated from the procudure on exam, wife at bedside and able to answer questions. Pt has no known history of atrial fibrillation and has never seen a cardiologist. Denies previous stress test or other cardiovascular testing. Pt has had several admissions for orthopedic injuries in the last year d/t falls, including bilateral hip replacements.  He states that for the last few weeks he has been in severe acute pain related to  his back issues. He has felt anxious and exhausted in general and it is difficult for them to say  How long he has been out of rhythm.     THOMAS- no testing but wife reports he does snore.  TSH- no known history of thyroid disease  Tobacco- current, 1.5 ppd  Etoh- 2-3 drinks daily      No Known Allergies    Prior to Admission Medications     Prescriptions Last Dose Informant Patient Reported? Taking?    celecoxib (CeleBREX) 200 MG capsule 2/13/2022  No Yes    TAKE 1 CAPSULE BY MOUTH TWICE DAILY    cyclobenzaprine (FLEXERIL) 10 MG tablet Past Month  No Yes    Take 1 tablet by mouth 3 (Three) Times a Day As Needed for Muscle Spasms.    DULoxetine (CYMBALTA) 30 MG capsule 2/13/2022  No Yes    TAKE ONE CAPSULE BY MOUTH DAILY    folic acid (FOLVITE) 1 MG tablet 2/13/2022  No Yes    TAKE 1 TABLET BY MOUTH EVERY DAY    gabapentin (NEURONTIN) 300 MG capsule 2/14/2022  No Yes    Take 1 capsule by mouth 3 (Three) Times a Day. Start taking QHS x 3 days, then BID x 3 days then TID    Patient taking differently:  Take 300 mg by mouth 3 (Three) Times a Day. Takes gabapentin 300 mg morning, afternoon and 600 mg po q hs.    HYDROcodone-acetaminophen (NORCO) 7.5-325 MG per tablet 2/13/2022  No Yes    Take 1 tablet by mouth Every 6 (Six) Hours As Needed for Moderate Pain .    lidocaine (LIDODERM) 5 % Past Month  No Yes    Place 1 patch on the skin as directed by provider Daily. Remove & Discard patch within 12 hours or as directed by MD    methylPREDNISolone (MEDROL) 4 MG dose pack 2/14/2022  No Yes    Take as directed on package instructions.    pantoprazole (PROTONIX) 40 MG EC tablet 2/13/2022  No Yes    Take 1 tablet by mouth 2 (Two) Times a Day.    polyethylene glycol (MIRALAX) 17 g packet Past Month  No Yes    Take 17 g by mouth Daily As Needed (constipation).    tamsulosin (FLOMAX) 0.4 MG capsule 24 hr capsule 2/13/2022  No Yes    TAKE 1 CAPSULE BY MOUTH AT BEDTIME    docusate sodium (COLACE) 100 MG capsule More than a month  Yes  "No    Take 100 mg by mouth 2 (Two) Times a Day.    Syringe/Needle, Disp, (B-D 3CC LUER-KISHA SYR 25GX1\") 25G X 1\" 3 ML misc   No No    USE AS DIRECTED EVERY MONTH          No current facility-administered medications for this encounter.    Social History     Socioeconomic History   • Marital status:    Tobacco Use   • Smoking status: Current Every Day Smoker     Packs/day: 1.50     Years: 0.00     Pack years: 0.00     Types: Cigarettes     Start date: 1966   • Smokeless tobacco: Never Used   • Tobacco comment:  · 1 - 1 1/2 PACKS PER DAY, FOR THE PAST 50 YEARS   Vaping Use   • Vaping Use: Never used   Substance and Sexual Activity   • Alcohol use: Yes     Comment: 3-4 glasses of bourbon every night    • Drug use: Never   • Sexual activity: Defer     Partners: Female       Family History   Problem Relation Age of Onset   • Cancer Mother    • No Known Problems Father    • No Known Problems Other    • Cancer Sister    • Diabetes Brother    • Stroke Brother        REVIEW OF SYSTEMS:   CONST:  No weight loss, fever, chills, weakness or + fatigue.   HEENT:  No visual loss, blurred vision, double vision, yellow sclerae.                   No hearing loss, congestion, sore throat.   SKIN:      No rashes, urticaria, ulcers, sores.     RESP:     No shortness of breath, hemoptysis, cough, sputum.   GI:           No anorexia, nausea, vomiting, diarrhea. No abdominal pain, melena.   :         No burning on urination, hematuria or increased frequency.  ENDO:    No diaphoresis, cold or heat intolerance. No polyuria or polydipsia.   NEURO:  No headache, dizziness, syncope, paralysis, ataxia, or parasthesias.                  No change in bowel or bladder control. No history of CVA/TIA  MUSC:    No muscle, + back pain, + joint pain and  stiffness.   HEME:    No anemia, bleeding, bruising. No history of DVT/PE.  PSYCH:  No history of depression, anxiety    Vitals:    02/14/22 0738 02/14/22 0740 02/14/22 0829 02/14/22 0928   BP: " "127/88 134/80 139/91 130/92   BP Location: Right arm Left arm     Patient Position: Lying Lying     Pulse: (!) 125  111 (!) 128   Resp: 18  16 16   Temp: 97.9 °F (36.6 °C)      TempSrc: Temporal      SpO2: 98% 99% 96% 98%   Weight: 73 kg (161 lb)      Height: 175.3 cm (69\")            Vital Sign Min/Max for last 24 hours  Temp  Min: 97.9 °F (36.6 °C)  Max: 97.9 °F (36.6 °C)   BP  Min: 127/88  Max: 139/91   Pulse  Min: 111  Max: 128   Resp  Min: 16  Max: 18   SpO2  Min: 96 %  Max: 99 %   Flow (L/min)  Min: 2  Max: 2    No intake or output data in the 24 hours ending 02/14/22 0947          Physical Exam:  GEN: Well nourished, Well- developed  No acute distress  HEENT: Normocephalic, Atraumatic, PERRLA, moist mucous membranes  NECK: supple, NO JVD, no thyromegaly, no lymphadenopathy  CARD: irregularly irregular, tachycardic , no murmur, gallop, rub  LUNGS: Clear to auscultataion, normal respiratory effort  ABDOMEN: Soft, nontender, normal bowel sounds  EXTREMITIES:No gross deformities,  No clubbing, cyanosis, or edema  SKIN: Warm, dry  NEURO: No focal deficits  PSYCHIATRIC: Normal affect and mood      I personally viewed and interpreted the patient's EKG/Telemetry/lab data    Data:   Results from last 7 days   Lab Units 02/14/22  0739   WBC 10*3/mm3 18.20*   HEMOGLOBIN g/dL 13.0   HEMATOCRIT % 40.1   PLATELETS 10*3/mm3 223     Results from last 7 days   Lab Units 02/14/22  0739   SODIUM mmol/L 135*   POTASSIUM mmol/L 4.2   CHLORIDE mmol/L 100   CO2 mmol/L 26.0   BUN mg/dL 24*   CREATININE mg/dL 0.90   GLUCOSE mg/dL 129*              No intake or output data in the 24 hours ending 02/14/22 0947    Chest X-Ray:  Imaging Results (Last 24 Hours)     ** No results found for the last 24 hours. **        Telemetry: afib with -128 bpm -After IV lopressor HR  bpm  EKG: Afib with RVR 120bpm        Assessment and Plan:   1. Atrial fibrillation  - Unknown duration. Last EKG showing NSR was 12/29/2021. Pt has been seen " by several other specialists in the last few weeks, however there has been no mention of afib .   He has had had acute back pain which he has rated at 10/10 for the last few days and his wife states he has been exhausted related to this but he is not specifically aware of palpitations and there does not know how long he has been out of rhythm,   -Echo pending  -Thyroid studies ordered  -Will order sleep study as outpatient  -Will apply Zio prior to discharge today   -IV lopressor now   -Will initiate metoprolol 25 mg BID for rate control on D/C  -Educated patient wife at bedside about BP and HR monitoring.     2. Anticoagulation:   -CHADSVASC: 1 (age) -discussed with Dr. Jessica recio to initiate ASA 81mg  tomorrow.  -Has bled score: 4, also of note pt has had falls in the last year. Will  Need ongoing reassessment of risks/ benefits of anticoagulation going forward.     3. Kidney disease - listed as stage III per chart review however  Creatinines ~0.7  In the last year.     4. Chronic Hep C    Okjeff for discharge to home with 2 week Zio patch, 1 month follow up with  Oskar BAY in EP clinic.     Scribed for Dr. Lopez  by VIKTORIYA Garza. 2/14/2022  10:28 EST    History and physical examination verified.  Agree with nurse practitioner's note.  71-year-old white male with a history of kidney disease, chronic hepatitis C, Bacot abuse, BPH, GERD, vertebral compression fracture, and atrial fibrillation of unknown duration.  We are asked to see the patient today to evaluate and manage his atrial fibrillation.  As per nurse practitioner's note, it would appear that the patient is unaware of his atrial fibrillation.  And was found on examination today with the patient not complain of any cardiac issues.  His biggest issue has been back pain for which he underwent a L4 and L5 kyphoplasty.  He is on steroids for his back pain.  Unfortunately at this time the patient is post procedure and is confused.  He is unable  to answer any of my questions.    Family history, social history, and review of systems as per nurse practitioner's note.    Physical Exam  Blood pressure is 117/76 pulse is 100 O2 saturation 97% afebrile.  Respiratory rate 16  Constitutional: oriented to person, place, and time.  well-developed and well-nourished. No distress.   HENT: Normocephalic.   Eyes: Conjunctivae are normal. No scleral icterus.   Neck: Normal carotid pulses, no hepatojugular reflux and no JVD present. Carotid bruit is not present. No tracheal deviation, no edema and no erythema present. No thyromegaly present.   Cardiovascular: Irregular regular rate and rhythm tachycardic normal S1 and S2.  No murmurs appreciated.  Pulses: equal and symmetrical.   Pulmonary/Chest: Clear to Auscultation bilaterally with no rales or wheezes. Resp effort NL  Abdominal: Soft. Bowel sounds are normal. no distension and no mass. There is no hepatosplenomegaly. There is no tenderness. There is no rebound and no guarding. No aortic pulsations.  Musculoskeletal:  exhibits no edema, tenderness or deformity.   Neurological: Confused and oriented x1.  Rest of examination nonfocal.  Skin: Skin is warm and dry. No rash noted. No diaphoretic. No cyanosis or erythema. No pallor. Nails show no clubbing.   Psychiatric: Unable to be tested.    Laboratory data remarkable for elevated white blood cell count of 18,000.  Creatinine 0.9.    Twelve-lead EKG shows atrial flutter at 130 bpm left anterior fascicular block for R wave progression.    Impression plan:    1.  Atrial fibrillation/atrial flutter with RVR.  Patient unaware that he is in atrial arrhythmias.  Renny Vasc Score of 1.  At this point we will treat with beta-blockers and AV genna medications for better rate control.  Due to elevated has bleed score, (kidney disease, hepatitis, multiple falls), would not pursue oral anticoagulation at this time.  Back pain and steroids very likely contributing to his atrial  fibrillation.  Needs Zio patch and echocardiogram for further assessment.  Would also check thyroid function test.

## 2022-02-14 NOTE — INTERVAL H&P NOTE
H&P updated. The patient was examined and the following changes are noted:  Patient presents for L4 & L5 kyphoplasty.  The patient does appear to have new onset atrial fibrillation, will get EKG, and have f/u with cardiology.

## 2022-02-15 NOTE — THERAPY EVALUATION
Patient Name: Ubaldo Mcleod  : 1950    MRN: [id number]                              Today's Date: 2/15/2022       Admit Date: 2022    Visit Dx:     ICD-10-CM ICD-9-CM   1. Closed compression fracture of L4 lumbar vertebra, initial encounter ([facility])  S32.040A 805.4   2. Closed compression fracture of L5 lumbar vertebra, initial encounter ([facility])  S32.050A 805.4     Patient Active Problem List   Diagnosis   • Marroquin's esophagus   • Hemochromatosis   • Hypertension   • Lacunar infarction (HCC)   • Peptic ulcer   • Cobalamin deficiency   • Chronic hepatitis C with cirrhosis ([facility])   • Benign prostatic hypertrophy   • Hyperbilirubinemia   • Porphyria cutanea tarda (HCC)   • Memory loss   • Stage 3 chronic kidney disease (HCC)   • Right hip fracture (CMS/HCC)   • Smoker   • GERD (gastroesophageal reflux disease)   • B12 deficiency   • Peripheral neuropathy   • Medicare annual wellness visit, subsequent   • Acute bilateral low back pain without sciatica   • Urge incontinence   • Elevated LFTs   • Closed left hip fracture ([facility])   • Fall   • Tobacco abuse   • Hyperbilirubinemia   • Alcohol abuse   • History of compression fracture of spine   • Lumbosacral radiculopathy due to intervertebral disc disorder   • Lumbar stenosis with neurogenic claudication   • Lumbosacral disc herniation   • Spondylosis of lumbar region without myelopathy or radiculopathy   • Sacroiliac joint dysfunction of left side   • Encounter for smoking cessation counseling   • Gait disturbance   • Physical deconditioning   • History of hemiarthroplasty of left hip   • At high risk for falls   • Tachycardia   • Urinary urgency   • Closed compression fracture of L4 lumbar vertebra, initial encounter ([facility])   • Closed compression fracture of L5 lumbar vertebra, initial encounter ([facility])     Past Medical History:   Diagnosis Date   • Chronic hepatitis C with cirrhosis ([facility])    • ED (erectile dysfunction) of non-organic origin    • GERD  (gastroesophageal reflux disease)    • Hemochromatosis    • History of cirrhosis    • Homocysteinemia    • Porphyria cutanea tarda (HCC)    • Weight loss      Past Surgical History:   Procedure Laterality Date   • CATARACT EXTRACTION WITH INTRAOCULAR LENS IMPLANT      dec 2016 - has 3 surgries on left eye and 1 on the right eye.    • COLONOSCOPY  2016   • ENDOSCOPY  2016   • EYE SURGERY     • HIP HEMIARTHROPLASTY Left 12/29/2021    Procedure: HIP HEMIARTHROPLASTY LEFT;  Surgeon: Brenden Sharp Jr., MD;  Location:  [facility];  Service: Orthopedics;  Laterality: Left;   • HIP TROCHANTERIC NAILING WITH INTRAMEDULLARY HIP SCREW Right 1/28/2021    Procedure: HIP TROCHANTERIC NAILING WITH INTRAMEDULLARY HIP SCREW RIGHT;  Surgeon: Brenden Sharp Jr., MD;  Location:  [facility];  Service: Orthopedics;  Laterality: Right;   • INTERVENTIONAL RADIOLOGY PROCEDURE N/A 2/14/2022    Procedure: IR vertebroplasty lumbar with fluoroscopy L4, L5;  Surgeon: Ephraim Lopez MD;  Location:  [facility];  Service: Interventional Radiology;  Laterality: N/A;   • LIVER BIOPSY     • REFRACTIVE SURGERY  06/2017    follow up surgery on both eyes       General Information     Row Name 02/15/22 1325          Physical Therapy Time and Intention    Document Type evaluation (P)   -LK     Mode of Treatment physical therapy (P)   -LK     Row Name 02/15/22 1325          General Information    Patient Profile Reviewed yes (P)   -LK     Prior Level of Function min assist:; all household mobility; community mobility; home management; ADL's (P)   -LK     Existing Precautions/Restrictions fall; spinal  -MARVEL     Barriers to Rehab cognitive status; previous functional deficit (P)   -LK     Row Name 02/15/22 1325          Living Environment    Lives With spouse (P)   -LK     Row Name 02/15/22 1325          Home Main Entrance    Number of Stairs, Main Entrance none (P)   -LK     Row Name 02/15/22 1325          Stairs Within Home, Primary     Number of Stairs, Within Home, Primary none (P)   -LK     Stairs Comment, Within Home, Primary No stairs in home (P)   -LK     Row Name 02/15/22 1329          Cognition    Orientation Status (Cognition) verbal cues/prompts needed for orientation; oriented to; person; place (P)   -LK     Row Name 02/15/22 1327          Safety Issues, Functional Mobility    Safety Issues Affecting Function (Mobility) ability to follow commands; awareness of need for assistance; impulsivity; insight into deficits/self-awareness; positioning of assistive device; problem-solving; safety precaution awareness; safety precautions follow-through/compliance; sequencing abilities (P)   -LK     Impairments Affecting Function (Mobility) cognition; pain; strength; endurance/activity tolerance; postural/trunk control (P)   -LK     Cognitive Impairments, Mobility Safety/Performance awareness, need for assistance; impulsivity; insight into deficits/self-awareness; problem-solving/reasoning; safety precaution awareness; safety precaution follow-through; sequencing abilities (P)   -LK     Comment, Safety Issues/Impairments (Mobility) Was alert at times throughout the session, would follow commands if repeatedly asked and required verbal cues to stay awake. (P)   -LK     Row Name 02/15/22 1328          Relationship/Environment    Name(s) of Who Lives With Patient Wife- Tiffany (P)   -LK           User Key  (r) = Recorded By, (t) = Taken By, (c) = Cosigned By    Initials Name Provider Type    Gunnar Braun, PT Physical Therapist    Samantha Alonzo, PT Student PT Student               Mobility     Row Name 02/15/22 1322          Bed Mobility    Bed Mobility rolling right; scooting/bridging; sidelying-sit (P)   -LK     Rolling Right [place] (Bed Mobility) moderate assist (50% patient effort); 2 person assist (P)   Cueing for hand and foot placement to log roll  -LK     Scooting/Bridging [place] (Bed Mobility) minimum assist (75% patient  effort) (P)   -LK     Sidelying-Sit [place] (Bed Mobility) 2 person assist; maximum assist (25% patient effort) (P)   verbal and tactile cues to push through his hand, needed assistance to swing legs off bed  -LK     Assistive Device (Bed Mobility) head of bed elevated (P)   -LK     Comment (Bed Mobility) Instructed in log roll technique. Difficulty following directions. Required max verbal and tactile cues for bed mobility. Was able to maintain sitting position once in sitting. (P)   -LK     Row Name 02/15/22 1325          Transfers    Comment (Transfers) Required verbal and tactile cueing for hand placement. Required cueing for upright posture. Was able to maintain standing with walker and minAx2 for 30 seconds. (P)   -LK     Row Name 02/15/22 1325          Bed-Chair Transfer    Bed-Chair [place] (Transfers) moderate assist (50% patient effort) (P)   -LK     Assistive Device (Bed-Chair Transfers) walker, front-wheeled (P)   -LK     Row Name 02/15/22 1325          Sit-Stand Transfer    Sit-Stand [place] (Transfers) minimum assist (75% patient effort); 2 person assist  -MARVEL     Assistive Device (Sit-Stand Transfers) walker, front-wheeled (P)   -LK     Row Name 02/15/22 1325          Gait/Stairs (Locomotion)    [place] Level (Gait) 2 person assist; minimum assist (75% patient effort)  -MARVEL     Assistive Device (Gait) walker, front-wheeled (P)   -LK     Distance in Feet (Gait) 10  -MARVEL     Deviations/Abnormal Patterns (Gait) bilateral deviations; antalgic; base of support, narrow; esme decreased; stride length decreased; weight shifting decreased (P)   -LK     Bilateral Gait Deviations forward flexed posture (P)   -LK     [place] Level (Stairs) not tested  -MARVEL     Comment (Gait/Stairs) Took 10 small steps using walker and minAx2 to chair before having to sit. Was able to maintain upright position and take small steps, but required max verbal cues to maintain upright posture and required verbal  cueing to keep walking.  -MARVEL           User Key  (r) = Recorded By, (t) = Taken By, (c) = Cosigned By    Initials Name Provider Type    Gunnar Braun, PT Physical Therapist    Samantha Alonzo, PT Student PT Student               Obj/Interventions     [facility] Name 02/15/22 1325          Range of Motion Comprehensive    General Range of Motion bilateral lower extremity ROM WFL (P)   -LK     Row Name 02/15/22 1325          Strength Comprehensive (MMT)    General Manual Muscle Testing (MMT) Assessment other (see comments); lower extremity strength deficits identified (P)   Unable to formally assess MMT due to lack of cognition. Can perform SLR, and mainain standing for 30 seconds.  -LK     Comment, General Manual Muscle Testing (MMT) Assessment Unable to formally assess MMT due to lack of cognition. Can perform SLR, and mainain standing for 30 seconds. Reported legs felt weak in standing. (P)   -LK     Row Name 02/15/22 1325          Motor Skills    Motor Skills therapeutic exercise (P)   -     Therapeutic Exercise knee; hip; ankle (P)   -LK     Row Name 02/15/22 1325          Hip (Therapeutic Exercise)    Hip (Therapeutic Exercise) isometric exercises (P)   -     Hip Isometrics (Therapeutic Exercise) bilateral; gluteal sets (P)   -LK     Row Name 02/15/22 1325          Knee (Therapeutic Exercise)    Knee (Therapeutic Exercise) AROM (active range of motion) (P)   -     Knee AROM (Therapeutic Exercise) heel slides; bilateral; quadriceps stretch; 10 repetitions (P)   -LK     Row Name 02/15/22 1325          Ankle (Therapeutic Exercise)    Ankle (Therapeutic Exercise) AROM (active range of motion) (P)   -     Ankle AROM (Therapeutic Exercise) bilateral; dorsiflexion; plantarflexion (P)   -LK     Row Name 02/15/22 1325          Balance    Balance Assessment standing static balance; standing dynamic balance (P)   -     Static Standing Balance supported; standing; moderate impairment (P)   Required walker for  balance  -LK     Dynamic Standing Balance moderate impairment; supported; standing (P)   Required walker for balance  -LK     Comment, Balance Relied on walker for balance (P)   -LK           User Key  (r) = Recorded By, (t) = Taken By, (c) = Cosigned By    Initials Name Provider Type    Samantha Alonzo, PT Student PT Student               Goals/Plan     Row Name 02/15/22 1325          Bed Mobility Goal 1 (PT)    Activity/Assistive Device (Bed Mobility Goal 1, PT) sit to supine/supine to sit  -MARVEL     [place] Level/Cues Needed (Bed Mobility Goal 1, PT) minimum assist (75% or more patient effort)  -MARVEL     Time Frame (Bed Mobility Goal 1, PT) long term goal (LTG); 10 days (P)   -LK     Row Name 02/15/22 1325          Transfer Goal 1 (PT)    Activity/Assistive Device (Transfer Goal 1, PT) sit-to-stand/stand-to-sit  -MARVEL     [place] Level/Cues Needed (Transfer Goal 1, PT) contact guard assist; 2 person assist  -MARVEL     Time Frame (Transfer Goal 1, PT) long term goal (LTG); 10 days (P)   -LK     Row Name 02/15/22 1325          Gait Training Goal 1 (PT)    Activity/Assistive Device (Gait Training Goal 1, PT) gait (walking locomotion); walker, rolling  -MARVEL     [place] Level (Gait Training Goal 1, PT) contact guard assist (P)   -LK     Distance (Gait Training Goal 1, PT) 150 feet (P)   -LK     Time Frame (Gait Training Goal 1, PT) long term goal (LTG); 10 days (P)   -LK           User Key  (r) = Recorded By, (t) = Taken By, (c) = Cosigned By    Initials Name Provider Type    Gunnar Braun, PT Physical Therapist    Samantha Alonzo, PT Student PT Student               Clinical Impression     Row Name 02/15/22 3815          Pain    Additional Documentation Pain Scale: Numbers Pre/Post-Treatment (Group) (P)   -LK     Row Name 02/15/22 1325          Pain Scale: Numbers Pre/Post-Treatment    Pretreatment Pain Rating 0/10 - no pain (P)   -LK     Posttreatment Pain Rating 0/10 - no pain (P)   -LK      Pre/Posttreatment Pain Comment Reported mild pain in his back that did not radiate to his legs when in standing. (P)   -LK     Pain Intervention(s) Repositioned (P)   -LK     Row Name 02/15/22 9061          Plan of Care Review    Plan of Care Reviewed With patient (P)   -LK     Progress improving (P)   -LK     Outcome Summary PT completed evaluation today. Pt lacked alertness throughout the session and required max verbal cueing to follow commands. TherEx were attempted in bed, but had difficultly following commands. Required modAx2 for bed mobility as he had difficulting following directions and required tactile cueing for hand and foot placement. Was able to maintain upright posture in sitting, and required minAx2 to stand and walk 10 steps before having to sit. Pt is at risk for falls due to lack of alertness and decreased safety awareness when transferring and walking. PT will continue to see this pt to promote independence with bed mobility, transfer, and gait. PT recommends inpatient rehab upon discharge. (P)   -LK     Row Name 02/15/22 1326          Therapy Assessment/Plan (PT)    Patient/Family Therapy Goals Statement (PT) Return home (P)   -LK     Rehab Potential (PT) fair, will monitor progress closely (P)   -LK     Criteria for Skilled Interventions Met (PT) yes; skilled treatment is necessary (P)   -LK     Row Name 02/15/22 1328          Vital Signs    O2 Delivery Pre Treatment room air (P)   -LK     O2 Delivery Intra Treatment room air (P)   -LK     O2 Delivery Post Treatment room air (P)   -LK     Pre Patient Position Supine (P)   -LK     Intra Patient Position Standing (P)   -LK     Post Patient Position Sitting (P)   -LK     Row Name 02/15/22 1323          Positioning and Restraints    Pre-Treatment Position in bed (P)   -LK     Post Treatment Position chair (P)   -LK     In Chair reclined; sitting; call light within reach; encouraged to call for assist; exit alarm on; with family/caregiver; waffle  cushion; legs elevated (P)   -LK           User Key  (r) = Recorded By, (t) = Taken By, (c) = Cosigned By    Initials Name Provider Type    Samantha Alonzo, PT Student PT Student               Outcome Measures     Row Name 02/15/22 1325 02/15/22 0800       How much help from another person do you currently need...    Turning from your back to your side while in flat bed without using bedrails? 3 (P)   -LK 3  -KN    Moving from lying on back to sitting on the side of a flat bed without bedrails? 2 (P)   -LK 2  -KN    Moving to and from a bed to a chair (including a wheelchair)? 2 (P)   -LK 2  -KN    Standing up from a chair using your arms (e.g., wheelchair, bedside chair)? 3 (P)   -LK 2  -KN    Climbing 3-5 steps with a railing? 1 (P)   -LK 1  -KN    To walk in hospital room? 2 (P)   -LK 1  -KN    AM-PAC 6 Clicks Score (PT) 13 (P)   -LK 11  -KN    Row Name 02/15/22 1325          Functional Assessment    Outcome Measure Options AM-PAC 6 Clicks Basic Mobility (PT) (P)   -DENNYS           User Key  (r) = Recorded By, (t) = Taken By, (c) = Cosigned By    Initials Name Provider Type    Brandi Negron, RN Registered Nurse    Samantha Alonzo, PT Student PT Student                             Physical Therapy Education                 Title: PT OT SLP Therapies (In Progress)     Topic: Physical Therapy (In Progress)     Point: Mobility training (In Progress)     Learning Progress Summary           Patient Acceptance, E, NR by DENNYS at 2/15/2022 1553    Comment: HEP, spinal precautions, instructed on safe bed mobility and transfer training.   Family Acceptance, E, NR by DENNYS at 2/15/2022 1553    Comment: HEP, spinal precautions, instructed on safe bed mobility and transfer training.                   Point: Home exercise program (In Progress)     Learning Progress Summary           Patient Acceptance, E, NR by DENNYS at 2/15/2022 1553    Comment: HEP, spinal precautions, instructed on safe bed mobility and transfer  training.   Family Acceptance, E, NR by LK at 2/15/2022 1553    Comment: HEP, spinal precautions, instructed on safe bed mobility and transfer training.                   Point: Body mechanics (In Progress)     Learning Progress Summary           Patient Acceptance, E, NR by LK at 2/15/2022 1553    Comment: HEP, spinal precautions, instructed on safe bed mobility and transfer training.   Family Acceptance, E, NR by LK at 2/15/2022 1553    Comment: HEP, spinal precautions, instructed on safe bed mobility and transfer training.                   Point: Precautions (In Progress)     Learning Progress Summary           Patient Acceptance, E, NR by LK at 2/15/2022 1553    Comment: HEP, spinal precautions, instructed on safe bed mobility and transfer training.   Family Acceptance, E, NR by LK at 2/15/2022 1553    Comment: HEP, spinal precautions, instructed on safe bed mobility and transfer training.                               User Key     Initials Effective Dates Name Provider Type Discipline     01/11/22 -  Samantha Metcalf, PT Student PT Student PT              PT Recommendation and Plan           Time Calculation:    PT Charges     Row Name 02/15/22 1543             Time Calculation    Start Time 1325 (P)   -LK      PT Non-Billable Time (min) -- (P)   -LK      PT Received On 02/15/22 (P)   -LK      PT Goal Re-Cert Due Date 02/25/22 (P)   -LK              Time Calculation- PT    Total Timed Code Minutes- PT 15 minute(s) (P)   -LK              Timed Charges    95654 - PT Therapeutic Activity Minutes 15 (P)   -LK              Untimed Charges    PT Eval/Re-eval Minutes 50 (P)   -LK              Total Minutes    Timed Charges Total Minutes 15 (P)   -LK      Untimed Charges Total Minutes 50 (P)   -LK       Total Minutes 65 (P)   -LK            User Key  (r) = Recorded By, (t) = Taken By, (c) = Cosigned By    Initials Name Provider Type    Samantha Alonzo, PT Student PT Student                  PT  G-Codes  Outcome Measure Options: (P) AM-PAC 6 Clicks Basic Mobility (PT)  AM-PAC 6 Clicks Score (PT): (P) 13    Gunnar Draper, PT  2/15/2022

## 2022-02-15 NOTE — PLAN OF CARE
Goal Outcome Evaluation:  Plan of Care Reviewed With: patient Alert to self and at times situation. Tachycardic 140 bpm this shift, on call neuro aware and no new orders given. Outpt heart monitor is placed to left upper chest. 2 small dressings to lumbar spine with small blood strike noted. Pt able to move BLL. Slept most of shift and very mobile in bed with occasional weight shift assistance per staff. Incontinent  with male purwick placed.

## 2022-02-15 NOTE — PLAN OF CARE
Goal Outcome Evaluation:  Plan of Care Reviewed With: (P) patient        Progress: (P) improving  Outcome Summary: (P) PT completed evaluation today. Pt lacked alertness throughout the session and required max verbal cueing to follow commands. TherEx were attempted in bed, but had difficultly following commands. Required modAx2 for bed mobility as he had difficulting following directions and required tactile cueing for hand and foot placement. Was able to maintain upright posture in sitting, and required minAx2 to stand and walk 10 steps before having to sit. Pt is at risk for falls due to lack of alertness and decreased safety awareness when transferring and walking. PT will continue to see this pt to promote independence with bed mobility, transfer, and gait. PT recommends inpatient rehab upon discharge.

## 2022-02-15 NOTE — PROGRESS NOTES
"    [facility]    Subjective   Ubaldo Mcleod 1950 71 y.o. male     02/15/22    Chief complaint:nausea    HPI  Had kypho yesterday and was slow to awake last pm. Was seen last pm by COR for tachycardia and a-fib, started on Zio patch and ASA.   This am he is c/o nausea. CXR is negative, chronic productive cough. 1.5 PPD tob. U/A is positive, had recent cystoscopy by urology.     Past Medical History:   Diagnosis Date   • Chronic hepatitis C with cirrhosis (HCC)    • ED (erectile dysfunction) of non-organic origin    • GERD (gastroesophageal reflux disease)    • Hemochromatosis    • History of cirrhosis    • Homocysteinemia    • Porphyria cutanea tarda (HCC)    • Weight loss         /83 (BP Location: Right arm, Patient Position: Lying)   Pulse (!) 148   Temp 98.9 °F (37.2 °C) (Axillary)   Resp 18   Ht 180 cm (70.87\")   Wt 73 kg (161 lb)   SpO2 94%   BMI 22.54 kg/m²      Scheduled Meds:celecoxib, 200 mg, Oral, BID  docusate sodium, 100 mg, Oral, BID  DULoxetine, 30 mg, Oral, Daily  folic acid, 1,000 mcg, Oral, Daily  gabapentin, 300 mg, Oral, TID  metoprolol tartrate, 25 mg, Oral, Q12H  multivitamin with minerals, 1 tablet, Oral, Daily  nicotine, 1 patch, Transdermal, Q24H  pantoprazole, 40 mg, Oral, BID  sulfamethoxazole-trimethoprim, 1 tablet, Oral, Q12H  tamsulosin, 0.4 mg, Oral, Nightly        PRN Meds:.•  acetaminophen  •  cyclobenzaprine  •  HYDROcodone-acetaminophen  •  ondansetron  •  polyethylene glycol  •  sodium chloride    CBC:      Lab 02/14/22  0739   WBC 18.20*   HEMOGLOBIN 13.0   HEMATOCRIT 40.1   PLATELETS 223   NEUTROS ABS 16.42*   IMMATURE GRANS (ABS) 0.10*   LYMPHS ABS 0.49*   MONOS ABS 1.07*   EOS ABS 0.08   .3*      Basic Metabolic Panel    Sodium Sodium   Date Value Ref Range Status   02/14/2022 135 (L) 136 - 145 mmol/L Final      Potassium Potassium   Date Value Ref Range Status   02/14/2022 4.2 3.5 - 5.2 mmol/L " Final      Chloride Chloride   Date Value Ref Range Status   02/14/2022 100 98 - 107 mmol/L Final      Bicarbonate No results found for: PLASMABICARB   BUN BUN   Date Value Ref Range Status   02/14/2022 24 (H) 8 - 23 mg/dL Final      Creatinine Creatinine   Date Value Ref Range Status   02/14/2022 0.90 0.76 - 1.27 mg/dL Final      Calcium Calcium   Date Value Ref Range Status   02/14/2022 9.7 8.6 - 10.5 mg/dL Final      Glucose      No components found for: GLUCOSE.*        Intake/Output                 02/14/22 0701 - 02/15/22 0700     [id number] [id number] Total              Intake    P.O.  --  180 180    I.V.  --  218.3 218.3    Total Intake -- 398.3 398.3       Output    Urine  --  600 600    Total Output -- 600 600        PE:   A//A/C/O  Lungs-clear  COR-      Neurologic Exam  Motor intact, moves all 4's    Assessment/Plan   1. Nausea- zofran  2. Back pain-will ask PT and PT to see.  3. Afib- has zio patch, cardiology f/u in 2 weeks. Started ASA.  4. Constipation-miralax and dulcolax supp.    TRANG Busby, Ephraim PATEL MD

## 2022-02-15 NOTE — PROGRESS NOTES
[facility] at [facility]  Progress Note       LOS: 0 days   Patient Care Team:  Best Garcia DO as PCP - General (Family Medicine)  Best Garcia DO as Referring Physician (Family Medicine)  Glenn Stubbs MD as Consulting Physician (Urology)    Chief Complaint:  Afib     Subjective  Denies palpitations or CP. Oriented to person and states he is in the he is in the hospital - not sure of date/ specific place.  Still confused today.  Denies any chest pain or shortness of breath or tachypalpitations.        Interval History: pt was confused yesterday afternoon and was admitted by Neurosurg for further Observation. Pt's HR came down yesterday after IV lopressor however was elevated again 130s overnight. PO metoprolol started this AM.    Review of Systems:   Pertinent positives in HPI, all others reviewed and negative.      Objective       Current Facility-Administered Medications:   •  acetaminophen (TYLENOL) tablet 500 mg, 500 mg, Oral, Q4H PRN, Kimberly Ortiz PA-C  •  bisacodyl (DULCOLAX) suppository 10 mg, 10 mg, Rectal, Daily, Kimberly Ortiz PA-C  •  celecoxib (CeleBREX) capsule 200 mg, 200 mg, Oral, BID, Kimberly Ortiz PA-C, 200 mg at 02/15/22 0814  •  cyclobenzaprine (FLEXERIL) tablet 10 mg, 10 mg, Oral, TID PRN, Kimberly Ortiz PA-C  •  docusate sodium (COLACE) capsule 100 mg, 100 mg, Oral, BID, Kimberly Ortiz PA-C, 100 mg at 02/15/22 0813  •  DULoxetine (CYMBALTA) DR capsule 30 mg, 30 mg, Oral, Daily, Jeanne Ortize AMANDA PA-C, 30 mg at 02/15/22 0813  •  folic acid (FOLVITE) tablet 1,000 mcg, 1,000 mcg, Oral, Daily, Kimberly Ortiz PA-C, 1,000 mcg at 02/15/22 0814  •  gabapentin (NEURONTIN) capsule 300 mg, 300 mg, Oral, TID, Jeanne Ortize AMANDA PA-C, 300 mg at 02/15/22 0813  •  HYDROcodone-acetaminophen (NORCO) 7.5-325 MG per tablet 1 tablet, 1 tablet, Oral, Q6H PRN, Kimberly Ortiz PA-C  •  metoprolol tartrate (LOPRESSOR) tablet 25 mg, 25 mg, Oral,  "Q12H, Casey Comfort, APRN, 25 mg at 02/15/22 0813  •  multivitamin with minerals 1 tablet, 1 tablet, Oral, Daily, Kimberly Ortiz PA-C, 1 tablet at 02/15/22 0814  •  nicotine (NICODERM CQ) 21 MG/24HR patch 1 patch, 1 patch, Transdermal, Q24H, Kimberly Ortiz PA-C, 1 patch at 02/15/22 0815  •  ondansetron (ZOFRAN) injection 4 mg, 4 mg, Intravenous, Q6H PRN, Kimberly Ortiz PA-C  •  pantoprazole (PROTONIX) EC tablet 40 mg, 40 mg, Oral, BID, Kimberly Ortiz PA-C, 40 mg at 02/15/22 0814  •  polyethylene glycol (MIRALAX) packet 17 g, 17 g, Oral, Daily PRN, Kimberly Ortiz PA-C  •  sodium chloride 0.9 % flush 1-10 mL, 1-10 mL, Intravenous, PRN, Kimberly Ortiz PA-C, 10 mL at 02/14/22 2146  •  sodium chloride 0.9 % with KCl 20 mEq/L infusion, 100 mL/hr, Intravenous, Continuous, Kimberly Ortiz PA-C, Last Rate: 100 mL/hr at 02/15/22 0818, 100 mL/hr at 02/15/22 0818  •  sulfamethoxazole-trimethoprim (BACTRIM DS,SEPTRA DS) 800-160 MG per tablet 1 tablet, 1 tablet, Oral, Q12H, Kimberly Otriz PA-C  •  tamsulosin (FLOMAX) 24 hr capsule 0.4 mg, 0.4 mg, Oral, Nightly, Jeanne Ortize AMANDA, PA-C, 0.4 mg at 02/14/22 2143    Vital Sign Min/Max for last 24 hours  Temp  Min: 98.9 °F (37.2 °C)  Max: 99.9 °F (37.7 °C)   BP  Min: 88/75  Max: 148/100   Pulse  Min: 91  Max: 148   Resp  Min: 16  Max: 20   SpO2  Min: 71 %  Max: 99 %   Flow (L/min)  Min: 2  Max: 2   Weight  Min: 73 kg (161 lb)  Max: 73 kg (161 lb)     Flowsheet Rows      First Filed Value   Admission Height 175.3 cm (69\") Documented at 02/14/2022 0738   Admission Weight 73 kg (161 lb) Documented at 02/14/2022 0738            Intake/Output Summary (Last 24 hours) at 2/15/2022 0935  Last data filed at 2/15/2022 0900  Gross per 24 hour   Intake 638.33 ml   Output 600 ml   Net 38.33 ml       Physical Exam:     General Appearance:    Alert, cooperative, in no acute distress   Lungs:    Clear to auscultation bilaterally.  Respiratory effort stable.    " Heart:   Irregular regular rate rhythm normal S1-S2.   Chest Wall:    No abnormalities observed   Abdomen:     Normal bowel sounds, no masses, benign examination.   Extremities:   Moves all extremities well, no edema, no cyanosis, no             redness   Pulses:   Pulses palpable and equal bilaterally   Skin:   No bleeding, bruising or rash        Results Review:   Results from last 7 days   Lab Units 22  0739   WBC 10*3/mm3 18.20*   HEMOGLOBIN g/dL 13.0   HEMATOCRIT % 40.1   PLATELETS 10*3/mm3 223     Results from last 7 days   Lab Units 22  0739   SODIUM mmol/L 135*   POTASSIUM mmol/L 4.2   CHLORIDE mmol/L 100   CO2 mmol/L 26.0   BUN mg/dL 24*   CREATININE mg/dL 0.90   GLUCOSE mg/dL 129*              Results from last 7 days   Lab Units 22  0739   TSH uIU/mL 1.900                   Intake/Output Summary (Last 24 hours) at 2/15/2022 0935  Last data filed at 2/15/2022 0900  Gross per 24 hour   Intake 638.33 ml   Output 600 ml   Net 38.33 ml       I personally viewed and interpreted the patient's EKG/Telemetry data    EK/15/22: Afib with  bpm     Telemetry:Afib with RVR -140 bpm     Ejection Fraction  No results found for: EF    Echo EF Estimated  Lab Results   Component Value Date    ECHOEFEST 68 2022         Present on Admission:  **None**    Assessment/Plan   Assessment and Plan:   1. Atrial fibrillation/atrial flutter with RVR  - Unknown duration. Last EKG showing NSR was 2021. Pt has been seen by several other specialists in the last few weeks, however there has been no mention of afib .   He has had had acute back pain which he has rated at 10/10 for the last few days and his wife states he has been exhausted related to this but he is not specifically aware of palpitations and there does not know how long he has been out of rhythm,   -Echo:22 : LVEF68%, mild to mod pulm HTN, mod TR. Mild aortic valve sclerosis.   -Thyroid studies: Normal yesterday 22    -Will order sleep study as outpatient  -Zio monitor in place    -HR improved yesterday with IV lopressor - patient ended up staying due to confusion. HR elevated overnight to 130s.   - metoprolol 25 mg BID - 1st dose this Am rates improved to 110s.  Initially there was question that the patient is going on atrial fibrillation while here in the hospital.  However it appears that he has been in atrial fibrillation atrial flutter the whole time.  At this point we will rate control with beta-blockers plus or minus digoxin.  -Educated patient wife at bedside about BP and HR monitoring.      2. Anticoagulation:   -CHADSVASC: 1 (age) -discussed with Dr. Jessica recio to initiate ASA 81mg 2/15/22- ordered this AM.   -Has bled score: 4, also of note pt has had falls in the last year. Will not pursue NOAC at this time due significant bleeding risk with overall low risk of CVA documented by Renny Vasc Score 1.       3. Kidney disease - listed as stage III per chart review however  Creatinines ~0.7  In the last year.      4. Chronic Hep C    5.Tob use/ ETOH use/ Confusion/ Elevated WBC - per primary team     Plan for disposition:Will follow and adjust meds for improved HR control.     Electronically signed by VIKTORIYA Garza, 02/15/22, 9:31 AM Domo OSBORNE MD, personally performed the services face to face as described and documented by the above named individual. I have made any necessary edits and it is both accurate and complete 2/15/2022  17:55 EST

## 2022-02-16 PROBLEM — I48.91 AFIB (HCC): Status: ACTIVE | Noted: 2022-01-01

## 2022-02-16 NOTE — THERAPY TREATMENT NOTE
Patient Name: Ubaldo Mcleod  : 1950    MRN: [id number]                              Today's Date: 2022       Admit Date: 2022    Visit Dx:     ICD-10-CM ICD-9-CM   1. Tachycardia  R00.0 785.0   2. Closed compression fracture of L4 lumbar vertebra, initial encounter (HCC)  S32.040A 805.4   3. Closed compression fracture of L5 lumbar vertebra, initial encounter ([facility])  S32.050A 805.4     Patient Active Problem List   Diagnosis   • Marroquin's esophagus   • Hemochromatosis   • Hypertension   • Lacunar infarction (HCC)   • Peptic ulcer   • Cobalamin deficiency   • Chronic hepatitis C with cirrhosis (HCC)   • Benign prostatic hypertrophy   • Hyperbilirubinemia   • Porphyria cutanea tarda (HCC)   • Memory loss   • Stage 3 chronic kidney disease (HCC)   • Right hip fracture (CMS/HCC)   • Smoker   • GERD (gastroesophageal reflux disease)   • B12 deficiency   • Peripheral neuropathy   • Medicare annual wellness visit, subsequent   • Acute bilateral low back pain without sciatica   • Urge incontinence   • Elevated LFTs   • Closed left hip fracture (HCC)   • Fall   • Tobacco abuse   • Hyperbilirubinemia   • Alcohol abuse   • History of compression fracture of spine   • Lumbosacral radiculopathy due to intervertebral disc disorder   • Lumbar stenosis with neurogenic claudication   • Lumbosacral disc herniation   • Spondylosis of lumbar region without myelopathy or radiculopathy   • Sacroiliac joint dysfunction of left side   • Encounter for smoking cessation counseling   • Gait disturbance   • Physical deconditioning   • History of hemiarthroplasty of left hip   • At high risk for falls   • Tachycardia   • Urinary urgency   • Closed compression fracture of L4 lumbar vertebra, initial encounter (HCC)   • Closed compression fracture of L5 lumbar vertebra, initial encounter (HCC)   • Afib (HCC)     Past Medical History:   Diagnosis Date   • Chronic hepatitis C with cirrhosis (HCC)    • ED (erectile  dysfunction) of non-organic origin    • GERD (gastroesophageal reflux disease)    • Hemochromatosis    • History of cirrhosis    • Homocysteinemia    • Porphyria cutanea tarda (HCC)    • Weight loss      Past Surgical History:   Procedure Laterality Date   • CATARACT EXTRACTION WITH INTRAOCULAR LENS IMPLANT      dec 2016 - has 3 surgries on left eye and 1 on the right eye.    • COLONOSCOPY  2016   • ENDOSCOPY  2016   • EYE SURGERY     • HIP HEMIARTHROPLASTY Left 12/29/2021    Procedure: HIP HEMIARTHROPLASTY LEFT;  Surgeon: Brenden Sharp Jr., MD;  Location:  [facility];  Service: Orthopedics;  Laterality: Left;   • HIP TROCHANTERIC NAILING WITH INTRAMEDULLARY HIP SCREW Right 1/28/2021    Procedure: HIP TROCHANTERIC NAILING WITH INTRAMEDULLARY HIP SCREW RIGHT;  Surgeon: Brenden Sharp Jr., MD;  Location:  [facility];  Service: Orthopedics;  Laterality: Right;   • INTERVENTIONAL RADIOLOGY PROCEDURE N/A 2/14/2022    Procedure: IR vertebroplasty lumbar with fluoroscopy L4, L5;  Surgeon: Ephraim Lopez MD;  Location:  [facility];  Service: Interventional Radiology;  Laterality: N/A;   • LIVER BIOPSY     • REFRACTIVE SURGERY  06/2017    follow up surgery on both eyes       General Information     Row Name 02/16/22 1610          Physical Therapy Time and Intention    Document Type therapy note (daily note)  -     Mode of Treatment individual therapy; physical therapy  -     Row Name 02/16/22 1610          General Information    Existing Precautions/Restrictions fall; spinal; left; hip, posterior   s/p left hemiarthroplasty 12/29/21  -     Row Name 02/16/22 1610          Cognition    Orientation Status (Cognition) oriented x 4  -     Row Name 02/16/22 1610          Safety Issues, Functional Mobility    Safety Issues Affecting Function (Mobility) awareness of need for assistance; safety precaution awareness; safety precautions follow-through/compliance; judgment; sequencing abilities  -      Impairments Affecting Function (Mobility) pain; strength; endurance/activity tolerance; postural/trunk control; balance; range of motion (ROM)  -           User Key  (r) = Recorded By, (t) = Taken By, (c) = Cosigned By    Initials Name Provider Type    Gunnar Braun, PT Physical Therapist               Mobility     Row Name 02/16/22 1610          Bed Mobility    Bed Mobility scooting/bridging; supine-sit; sit-supine  -MARVEL     Scooting/Bridging [place] (Bed Mobility) contact guard; verbal cues  -MARVEL     Supine-Sit [place] (Bed Mobility) contact guard; verbal cues  -MARVEL     Sit-Supine [place] (Bed Mobility) contact guard; verbal cues  -MARVEL     Assistive Device (Bed Mobility) head of bed elevated; bed rails  -MARVEL     Comment (Bed Mobility) Verbal cues for use of logroll technique during bed mobility  -     Row Name 02/16/22 1610          Transfers    Comment (Transfers) Verbal cues for safe hand placement during standing/sitting and maintaining spinal precautions  -     Row Name 02/16/22 1610          Sit-Stand Transfer    Sit-Stand [place] (Transfers) minimum assist (75% patient effort); verbal cues  -MARVEL     Assistive Device (Sit-Stand Transfers) walker, front-wheeled  -MARVEL     Row Name 02/16/22 1610          Gait/Stairs (Locomotion)    [place] Level (Gait) verbal cues; minimum assist (75% patient effort)  -     Assistive Device (Gait) walker, front-wheeled  -     Distance in Feet (Gait) 25  -MARVEL     Deviations/Abnormal Patterns (Gait) bilateral deviations; antalgic; base of support, narrow; esme decreased; stride length decreased; weight shifting decreased  -     Bilateral Gait Deviations forward flexed posture  -     [place] Level (Stairs) not tested  -MARVEL     Comment (Gait/Stairs) Pt ambulated with step to pattern and decreased speed. Verbal cues for maintaining upright posture, body within walker, increase step length, and WB through LEs. Gait limited by fatigue and weakness.   -           User Key  (r) = Recorded By, (t) = Taken By, (c) = Cosigned By    Initials Name Provider Type    Gunnar Braun PT Physical Therapist               Obj/Interventions     Row Name 02/16/22 1610          Motor Skills    Therapeutic Exercise hip; knee; ankle; other (see comments)  ab sets, shoulder flexion, BKFO  -     Row Name 02/16/22 1610          Hip (Therapeutic Exercise)    Hip (Therapeutic Exercise) isometric exercises; AROM (active range of motion)  -     Hip AROM (Therapeutic Exercise) bilateral; external rotation; internal rotation; 10 repetitions  -     Hip Isometrics (Therapeutic Exercise) gluteal sets; 10 repetitions  -     Row Name 02/16/22 1610          Knee (Therapeutic Exercise)    Knee (Therapeutic Exercise) isometric exercises; strengthening exercise  -     Knee Isometrics (Therapeutic Exercise) quad sets; 10 repetitions  -     Knee Strengthening (Therapeutic Exercise) bilateral; heel slides; LAQ (long arc quad); 10 repetitions  -     Row Name 02/16/22 1610          Ankle (Therapeutic Exercise)    Ankle (Therapeutic Exercise) AROM (active range of motion)  -     Ankle AROM (Therapeutic Exercise) dorsiflexion; plantarflexion; bilateral; 10 repetitions  -     Row Name 02/16/22 1610          Balance    Balance Assessment sitting static balance; sitting dynamic balance; standing static balance; standing dynamic balance  -     Static Sitting Balance WFL; unsupported; sitting, edge of bed  -     Dynamic Sitting Balance WFL; supported; sitting, edge of bed  -     Static Standing Balance mild impairment; supported; standing  -     Dynamic Standing Balance mild impairment; supported; standing  -           User Key  (r) = Recorded By, (t) = Taken By, (c) = Cosigned By    Initials Name Provider Type    Gunnar Braun PT Physical Therapist               Goals/Plan    No documentation.                Clinical Impression     Row Name 02/16/22 1610          Pain     Additional Documentation Pain Scale: Numbers Pre/Post-Treatment (Group)  -     Row Name 02/16/22 1610          Pain Scale: Numbers Pre/Post-Treatment    Pretreatment Pain Rating 0/10 - no pain  -MARVEL     Posttreatment Pain Rating 0/10 - no pain  -[facility] Name 02/16/22 1610          Therapy Assessment/Plan (PT)    Rehab Potential (PT) fair, will monitor progress closely  -MARVEL     Criteria for Skilled Interventions Met (PT) yes; skilled treatment is necessary; meets criteria  -[facility] Name 02/16/22 1610          Positioning and Restraints    Pre-Treatment Position in bed  -MARVEL     Post Treatment Position bed  -MARVEL     In Bed notified nsg; fowlers; call light within reach; encouraged to call for assist; exit alarm on; SCD pump applied  -           User Key  (r) = Recorded By, (t) = Taken By, (c) = Cosigned By    Initials Name Provider Type    Gunnar Braun, PT Physical Therapist               Outcome Measures     Row Name 02/16/22 1610 02/16/22 0800       How much help from another person do you currently need...    Turning from your back to your side while in flat bed without using bedrails? 3  -MARVEL 3  -KN    Moving from lying on back to sitting on the side of a flat bed without bedrails? 3  -MARVEL 2  -KN    Moving to and from a bed to a chair (including a wheelchair)? 2  -MARVEL 2  -KN    Standing up from a chair using your arms (e.g., wheelchair, bedside chair)? 3  -MARVEL 3  -KN    Climbing 3-5 steps with a railing? 1  -MARVEL 1  -KN    To walk in hospital room? 3  -MARVEL 2  -KN    AM-PAC 6 Clicks Score (PT) 15  -MARVEL 13  -KN    [facility] Name 02/16/22 1204          Functional Assessment    Outcome Measure Options AM-PAC 6 Clicks Daily Activity (OT)  -AR           User Key  (r) = Recorded By, (t) = Taken By, (c) = Cosigned By    Initials Name Provider Type    Anny Haji, OT Occupational Therapist    Brandi Negron, RN Registered Nurse    Gunnar Braun, PT Physical Therapist                             Physical Therapy  Education                 Title: PT OT SLP Therapies (In Progress)     Topic: Physical Therapy (In Progress)     Point: Mobility training (In Progress)     Learning Progress Summary           Patient Acceptance, E,D, NR by MARVEL at 2/16/2022 1610    Comment: Educated on safe sequencing with bed mobility, ambulatory transfers, and gait training. Reviewed HEP, logroll technique, and spinal precautions.    Acceptance, E, NR by LK at 2/15/2022 1553    Comment: HEP, spinal precautions, instructed on safe bed mobility and transfer training.   Family Acceptance, E, NR by LK at 2/15/2022 1553    Comment: HEP, spinal precautions, instructed on safe bed mobility and transfer training.                   Point: Home exercise program (In Progress)     Learning Progress Summary           Patient Acceptance, E,D, NR by MARVEL at 2/16/2022 1610    Comment: Educated on safe sequencing with bed mobility, ambulatory transfers, and gait training. Reviewed HEP, logroll technique, and spinal precautions.    Acceptance, E, NR by LK at 2/15/2022 1553    Comment: HEP, spinal precautions, instructed on safe bed mobility and transfer training.   Family Acceptance, E, NR by LK at 2/15/2022 1553    Comment: HEP, spinal precautions, instructed on safe bed mobility and transfer training.                   Point: Body mechanics (In Progress)     Learning Progress Summary           Patient Acceptance, E,D, NR by MARVEL at 2/16/2022 1610    Comment: Educated on safe sequencing with bed mobility, ambulatory transfers, and gait training. Reviewed HEP, logroll technique, and spinal precautions.    Acceptance, E, NR by LK at 2/15/2022 1553    Comment: HEP, spinal precautions, instructed on safe bed mobility and transfer training.   Family Acceptance, E, NR by LK at 2/15/2022 1553    Comment: HEP, spinal precautions, instructed on safe bed mobility and transfer training.                   Point: Precautions (In Progress)     Learning Progress Summary            Patient Acceptance, E,D, NR by MARVEL at 2/16/2022 1610    Comment: Educated on safe sequencing with bed mobility, ambulatory transfers, and gait training. Reviewed HEP, logroll technique, and spinal precautions.    Acceptance, E, NR by  at 2/15/2022 1553    Comment: HEP, spinal precautions, instructed on safe bed mobility and transfer training.   Family Acceptance, E, NR by DENNYS at 2/15/2022 1553    Comment: HEP, spinal precautions, instructed on safe bed mobility and transfer training.                               User Key     Initials Effective Dates Name Provider Type Discipline     06/16/21 -  Gunnar Draper, PT Physical Therapist PT    DENNYS 01/11/22 -  Samantha Metcalf PT Student PT Student PT              PT Recommendation and Plan     Plan of Care Reviewed With: patient  Progress: improving  Outcome Summary: Pt increased ambulation distance to 25 feet using RW and min A for balance and AD management. Gait limited by fatigue and weakness. Bed mobility performed with CGA and STS with min A. Reviewed HEP, spinal precautions, and logroll technique. Continue recommend pt d/c IPR when appropriate.     Time Calculation:    PT Charges     Row Name 02/16/22 1610             Time Calculation    Start Time 1610  -MARVEL      PT Received On 02/16/22  -      PT Goal Re-Cert Due Date 02/25/22  -              Time Calculation- PT    Total Timed Code Minutes- PT 24 minute(s)  -              Timed Charges    74593 - PT Therapeutic Exercise Minutes 8  -MARVEL      71681 - Gait Training Minutes  16  -MARVEL              Total Minutes    Timed Charges Total Minutes 24  -MARVEL       Total Minutes 24  -MARVEL            User Key  (r) = Recorded By, (t) = Taken By, (c) = Cosigned By    Initials Name Provider Type     Gunnar Draper, PT Physical Therapist              Therapy Charges for Today     Code Description Service Date Service Provider Modifiers Qty    29313427743 HC PT THER PROC EA 15 MIN 2/16/2022 Gunnar Draper, PT GP 1    29618739998 HC  GAIT TRAINING EA 15 MIN 2/16/2022 Gunnar Draper, PT GP 1          PT G-Codes  Outcome Measure Options: AM-PAC 6 Clicks Daily Activity (OT)  AM-PAC 6 Clicks Score (PT): 15  AM-PAC 6 Clicks Score (OT): 13    Gunnar Draper, PT  2/16/2022

## 2022-02-16 NOTE — THERAPY EVALUATION
Patient Name: Ubaldo Mcleod  : 1950    MRN: [id number]                              Today's Date: 2022       Admit Date: 2022    Visit Dx:     ICD-10-CM ICD-9-CM   1. Tachycardia  R00.0 785.0   2. Closed compression fracture of L4 lumbar vertebra, initial encounter (HCC)  S32.040A 805.4   3. Closed compression fracture of L5 lumbar vertebra, initial encounter ([facility])  S32.050A 805.4     Patient Active Problem List   Diagnosis   • Marroquin's esophagus   • Hemochromatosis   • Hypertension   • Lacunar infarction (HCC)   • Peptic ulcer   • Cobalamin deficiency   • Chronic hepatitis C with cirrhosis (HCC)   • Benign prostatic hypertrophy   • Hyperbilirubinemia   • Porphyria cutanea tarda (HCC)   • Memory loss   • Stage 3 chronic kidney disease (HCC)   • Right hip fracture (CMS/HCC)   • Smoker   • GERD (gastroesophageal reflux disease)   • B12 deficiency   • Peripheral neuropathy   • Medicare annual wellness visit, subsequent   • Acute bilateral low back pain without sciatica   • Urge incontinence   • Elevated LFTs   • Closed left hip fracture (HCC)   • Fall   • Tobacco abuse   • Hyperbilirubinemia   • Alcohol abuse   • History of compression fracture of spine   • Lumbosacral radiculopathy due to intervertebral disc disorder   • Lumbar stenosis with neurogenic claudication   • Lumbosacral disc herniation   • Spondylosis of lumbar region without myelopathy or radiculopathy   • Sacroiliac joint dysfunction of left side   • Encounter for smoking cessation counseling   • Gait disturbance   • Physical deconditioning   • History of hemiarthroplasty of left hip   • At high risk for falls   • Tachycardia   • Urinary urgency   • Closed compression fracture of L4 lumbar vertebra, initial encounter (HCC)   • Closed compression fracture of L5 lumbar vertebra, initial encounter (HCC)   • Afib (HCC)     Past Medical History:   Diagnosis Date   • Chronic hepatitis C with cirrhosis (HCC)    • ED (erectile  dysfunction) of non-organic origin    • GERD (gastroesophageal reflux disease)    • Hemochromatosis    • History of cirrhosis    • Homocysteinemia    • Porphyria cutanea tarda (HCC)    • Weight loss      Past Surgical History:   Procedure Laterality Date   • CATARACT EXTRACTION WITH INTRAOCULAR LENS IMPLANT      dec 2016 - has 3 surgries on left eye and 1 on the right eye.    • COLONOSCOPY  2016   • ENDOSCOPY  2016   • EYE SURGERY     • HIP HEMIARTHROPLASTY Left 12/29/2021    Procedure: HIP HEMIARTHROPLASTY LEFT;  Surgeon: Brenden Sharp Jr., MD;  Location:  [facility];  Service: Orthopedics;  Laterality: Left;   • HIP TROCHANTERIC NAILING WITH INTRAMEDULLARY HIP SCREW Right 1/28/2021    Procedure: HIP TROCHANTERIC NAILING WITH INTRAMEDULLARY HIP SCREW RIGHT;  Surgeon: Brenden Sharp Jr., MD;  Location:  [facility];  Service: Orthopedics;  Laterality: Right;   • INTERVENTIONAL RADIOLOGY PROCEDURE N/A 2/14/2022    Procedure: IR vertebroplasty lumbar with fluoroscopy L4, L5;  Surgeon: Ephraim Lopez MD;  Location:  [facility] CATH INVASIVE LOCATION;  Service: Interventional Radiology;  Laterality: N/A;   • LIVER BIOPSY     • REFRACTIVE SURGERY  06/2017    follow up surgery on both eyes       General Information     Row Name 02/16/22 0858          OT Time and Intention    Document Type evaluation  -AR     Mode of Treatment occupational therapy; individual therapy  -AR     Row Name 02/16/22 0858          General Information    Prior Level of Function mod assist:; transfer; ADL's  pt reports he has been sleeping on couch, using briefs to toilet and used wc household mobility  -AR     Existing Precautions/Restrictions fall; spinal; left; hip, posterior   s/p left hemiarthroplasty 12/29/21  -AR     Barriers to Rehab previous functional deficit  -AR     Row Name 02/16/22 0858          Living Environment    Lives With spouse  -AR     Row Name 02/16/22 0858          Home Main Entrance    Number of Stairs, Main Entrance none   -AR     Stair Railings, Main Entrance none  -AR     Row Name 02/16/22 0858          Stairs Within Home, Primary    Stairs Comment, Within Home, Primary walk-in shower with seat, raised commode with handles  -AR     Row Name 02/16/22 0858          Cognition    Orientation Status (Cognition) oriented to; oriented x 4  -AR     Row Name 02/16/22 0858          Safety Issues, Functional Mobility    Safety Issues Affecting Function (Mobility) judgment; problem-solving; safety precaution awareness; safety precautions follow-through/compliance  -AR     Impairments Affecting Function (Mobility) pain; strength; endurance/activity tolerance; postural/trunk control; balance; range of motion (ROM)  -AR           User Key  (r) = Recorded By, (t) = Taken By, (c) = Cosigned By    Initials Name Provider Type    Anny Haji OT Occupational Therapist                 Mobility/ADL's     Row Name 02/16/22 1137          Bed Mobility    Bed Mobility supine-sit; sit-supine; scooting/bridging  -AR     Scooting/Bridging Brown (Bed Mobility) contact guard; verbal cues  -AR     Supine-Sit Brown (Bed Mobility) contact guard; verbal cues  -AR     Sit-Supine Brown (Bed Mobility) contact guard; verbal cues  -AR     Assistive Device (Bed Mobility) head of bed elevated; bed rails  -AR     Comment (Bed Mobility) Reviewed logroll technique. Pt able to maintain supine-to-sit, however unable to maintain for sit-to-supine.  -AR     Row Name 02/16/22 1137          Transfers    Transfers sit-stand transfer  -AR     Comment (Transfers) Pt with unsafe hand placement, mild impulsivity noted. Retrograde stance noted with initial standing. Pt declined transfer to chair.  -AR     Assistive Device (Bed-Chair Transfers) walker, front-wheeled; crutches, axillary  -AR     Sit-Stand Brown (Transfers) minimum assist (75% patient effort); verbal cues  -AR     Row Name 02/16/22 8969          Sit-Stand Transfer    Assistive Device  (Sit-Stand Transfers) walker, front-wheeled  -AR     Row Name 02/16/22 1137          Activities of Daily Living    BADL Assessment/Intervention upper body dressing; feeding; lower body dressing; bathing  -[facility] Name 02/16/22 1137          Upper Body Dressing Assessment/Training    [place] Level (Upper Body Dressing) doff; don; front opening garment; minimum assist (75% patient effort)  -AR     Position (Upper Body Dressing) edge of bed sitting  -AR     [facility] Name 02/16/22 1137          Self-Feeding Assessment/Training    [place] Level (Feeding) feeding skills; supervision; set up  -[facility] Name 02/16/22 1137          Lower Body Dressing Assessment/Training    Comment (Lower Body Dressing) Pt has reacher at home, he requested replacement device. He declined need for shoe horn, sock aide as he states he has them at home.  -[facility] Name 02/16/22 1137          Bathing Assessment/Intervention    Comment (Bathing) Pt declined need for LH sponge.  -AR           User Key  (r) = Recorded By, (t) = Taken By, (c) = Cosigned By    Initials Name Provider Type    Anny Haji, OT Occupational Therapist               Obj/Interventions     [facility] Name 02/16/22 1144          Sensory Assessment (Somatosensory)    Sensory Assessment (Somatosensory) UE sensation intact  -[facility] Name 02/16/22 1144          Range of Motion Comprehensive    General Range of Motion bilateral upper extremity ROM WNL  -AR     Row Name 02/16/22 1144          Strength Comprehensive (MMT)    General Manual Muscle Testing (MMT) Assessment upper extremity strength deficits identified  -AR     Comment, General Manual Muscle Testing (MMT) Assessment BUE WFL 3+/5  -[facility] Name 02/16/22 1144          Balance    Balance Assessment sitting static balance; sitting dynamic balance; standing static balance; standing dynamic balance  -AR     Static Sitting Balance WFL; sitting, edge of bed  -AR     Dynamic Sitting Balance WFL; sitting,  edge of bed  -AR     Static Standing Balance mild impairment; standing; supported  -AR           User Key  (r) = Recorded By, (t) = Taken By, (c) = Cosigned By    Initials Name Provider Type    Anny Haji, OT Occupational Therapist               Goals/Plan     Row Name 02/16/22 1201          Transfer Goal 1 (OT)    Activity/Assistive Device (Transfer Goal 1, OT) sit-to-stand/stand-to-sit; commode, bedside with drop arms; walker, rolling  -AR     [place] Level/Cues Needed (Transfer Goal 1, OT) verbal cues required; minimum assist (75% or more patient effort)  -AR     Time Frame (Transfer Goal 1, OT) long term goal (LTG); by discharge  -AR     Progress/Outcome (Transfer Goal 1, OT) goal ongoing  -AR     Row Name 02/16/22 1201          Dressing Goal 1 (OT)    Activity/Device (Dressing Goal 1, OT) lower body dressing; reacher; sock-aid  -AR     [place]/Cues Needed (Dressing Goal 1, OT) verbal cues required; contact guard assist  -AR     Time Frame (Dressing Goal 1, OT) long term goal (LTG); by discharge  -AR     Progress/Outcome (Dressing Goal 1, OT) goal ongoing  -AR     Row Name 02/16/22 1201          Toileting Goal 1 (OT)    Activity/Device (Toileting Goal 1, OT) toileting skills, all; commode, bedside without drop arms  -AR     [place] Level/Cues Needed (Toileting Goal 1, OT) verbal cues required; minimum assist (75% or more patient effort)  -AR     Time Frame (Toileting Goal 1, OT) long term goal (LTG); by discharge  -AR     Progress/Outcome (Toileting Goal 1, OT) goal ongoing  -AR     Row Name 02/16/22 1201          Problem Specific Goal 1 (OT)    Problem Specific Goal 1 (OT) Pt will recall/maintain spinal precautions during ADL task with min assist  -AR     Time Frame (Problem Specific Goal 1, OT) long term goal (LTG); by discharge  -AR     Progress/Outcome (Problem Specific Goal 1, OT) goal ongoing  -AR     Row Name 02/16/22 1201          Therapy Assessment/Plan (OT)    Planned Therapy  Interventions (OT) adaptive equipment training; BADL retraining; functional balance retraining; IADL retraining; occupation/activity based interventions; patient/caregiver education/training; ROM/therapeutic exercise; strengthening exercise; transfer/mobility retraining  -AR           User Key  (r) = Recorded By, (t) = Taken By, (c) = Cosigned By    Initials Name Provider Type    Anny Haji OT Occupational Therapist               Clinical Impression     Row Name 02/16/22 1145          Pain Scale: Numbers Pre/Post-Treatment    Pretreatment Pain Rating 0/10 - no pain  -AR     Posttreatment Pain Rating 0/10 - no pain  -AR     Row Name 02/16/22 1145          Plan of Care Review    Plan of Care Reviewed With patient  -AR     Outcome Summary Pt alert, Ox4. He completed bed mobility with CGA and required cues for spinal precautions. He completed UB dressing with min assist, sit-to-stand transfer with min assist and retrograde stance noted. Pt s/p L hip hemiathroplasty 12/29 and reports multiple recent falls at home. He has been sleeping on a couch and using briefs to toilet. Recommend IPR.  -AR     Row Name 02/16/22 1145          Therapy Assessment/Plan (OT)    Rehab Potential (OT) good, to achieve stated therapy goals  -AR     Criteria for Skilled Therapeutic Interventions Met (OT) yes  -AR     Therapy Frequency (OT) daily  -AR     Row Name 02/16/22 1145          Vital Signs    Pre Patient Position Supine  -AR     Intra Patient Position Standing  -AR     Post Patient Position Supine  -AR     Row Name 02/16/22 1145          Positioning and Restraints    Pre-Treatment Position in bed  -AR     Post Treatment Position bed  -AR     In Bed supine; call light within reach; encouraged to call for assist; exit alarm on; SCD pump applied  -AR           User Key  (r) = Recorded By, (t) = Taken By, (c) = Cosigned By    Initials Name Provider Type    Anny Haji OT Occupational Therapist               Outcome  Measures     Row Name 02/16/22 1204          How much help from another is currently needed...    Putting on and taking off regular lower body clothing? 2  -AR     Bathing (including washing, rinsing, and drying) 2  -AR     Toileting (which includes using toilet bed pan or urinal) 2  -AR     Putting on and taking off regular upper body clothing 3  -AR     Taking care of personal grooming (such as brushing teeth) 2  -AR     Eating meals 2  -AR     AM-PAC 6 Clicks Score (OT) 13  -AR     Row Name 02/16/22 0800          How much help from another person do you currently need...    Turning from your back to your side while in flat bed without using bedrails? 3  -KN     Moving from lying on back to sitting on the side of a flat bed without bedrails? 2  -KN     Moving to and from a bed to a chair (including a wheelchair)? 2  -KN     Standing up from a chair using your arms (e.g., wheelchair, bedside chair)? 3  -KN     Climbing 3-5 steps with a railing? 1  -KN     To walk in hospital room? 2  -KN     AM-PAC 6 Clicks Score (PT) 13  -KN     Row Name 02/16/22 1204          Functional Assessment    Outcome Measure Options AM-PAC 6 Clicks Daily Activity (OT)  -AR           User Key  (r) = Recorded By, (t) = Taken By, (c) = Cosigned By    Initials Name Provider Type    Anny Haji OT Occupational Therapist    Brandi Negron, RN Registered Nurse                Occupational Therapy Education                 Title: PT OT SLP Therapies (In Progress)     Topic: Occupational Therapy (Done)     Point: ADL training (Done)     Description:   Instruct learner(s) on proper safety adaptation and remediation techniques during self care or transfers.   Instruct in proper use of assistive devices.              Learning Progress Summary           Patient Eager, E,TB,D, VU,NR by AR at 2/16/2022 1205                   Point: Home exercise program (Done)     Description:   Instruct learner(s) on appropriate technique for  monitoring, assisting and/or progressing therapeutic exercises/activities.              Learning Progress Summary           Patient Eager, E,TB,D, VU,NR by AR at 2/16/2022 1205                   Point: Precautions (Done)     Description:   Instruct learner(s) on prescribed precautions during self-care and functional transfers.              Learning Progress Summary           Patient Eager, E,TB,D, VU,NR by AR at 2/16/2022 1205                   Point: Body mechanics (Done)     Description:   Instruct learner(s) on proper positioning and spine alignment during self-care, functional mobility activities and/or exercises.              Learning Progress Summary           Patient Eager, E,TB,D, VU,NR by AR at 2/16/2022 1205                               User Key     Initials Effective Dates Name Provider Type Discipline    AR 06/16/21 -  Anny Johnston, OT Occupational Therapist OT              OT Recommendation and Plan  Planned Therapy Interventions (OT): adaptive equipment training, BADL retraining, functional balance retraining, IADL retraining, occupation/activity based interventions, patient/caregiver education/training, ROM/therapeutic exercise, strengthening exercise, transfer/mobility retraining  Therapy Frequency (OT): daily  Plan of Care Review  Plan of Care Reviewed With: patient  Outcome Summary: Pt alert, Ox4. He completed bed mobility with CGA and required cues for spinal precautions. He completed UB dressing with min assist, sit-to-stand transfer with min assist and retrograde stance noted. Pt s/p L hip hemiathroplasty 12/29 and reports multiple recent falls at home. He has been sleeping on a couch and using briefs to toilet. Recommend IPR.     Time Calculation:    Time Calculation- OT     Row Name 02/16/22 1206             Time Calculation- OT    OT Start Time 0811  -AR      OT Received On 02/16/22  -AR      OT Goal Re-Cert Due Date 02/26/22  -AR              Untimed Charges    OT Eval/Re-eval  Minutes 57  -AR              Total Minutes    Untimed Charges Total Minutes 57  -AR       Total Minutes 57  -AR            User Key  (r) = Recorded By, (t) = Taken By, (c) = Cosigned By    Initials Name Provider Type    Anny Haji OT Occupational Therapist              Therapy Charges for Today     Code Description Service Date Service Provider Modifiers Qty    [id number]  OT EVAL LOW COMPLEXITY 4 2/16/2022 Anny Johnston OT GO 1               Anny Johnston OT  2/16/2022

## 2022-02-16 NOTE — PROGRESS NOTES
"    [facility]    Subjective   Ubaldo Mcleod 1950 71 y.o. male     02/16/22    Chief complaint:no  HPI  2 Days Post-Op. Poor participation with PT yesterday. Will need rehab or home.    Past Medical History:   Diagnosis Date    Chronic hepatitis C with cirrhosis (HCC)     ED (erectile dysfunction) of non-organic origin     GERD (gastroesophageal reflux disease)     Hemochromatosis     History of cirrhosis     Homocysteinemia     Porphyria cutanea tarda (HCC)     Weight loss         BP (!) 76/55 (BP Location: Right arm, Patient Position: Lying) Comment: nurse aware  Pulse 80   Temp 98.4 °F (36.9 °C) (Oral)   Resp 16   Ht 180 cm (70.87\")   Wt 77.8 kg (171 lb 9.6 oz)   SpO2 98%   BMI 24.02 kg/m²      Scheduled Meds:aspirin, 81 mg, Oral, Daily  bisacodyl, 10 mg, Rectal, Daily  celecoxib, 200 mg, Oral, BID  docusate sodium, 100 mg, Oral, BID  DULoxetine, 30 mg, Oral, Daily  folic acid, 1,000 mcg, Oral, Daily  gabapentin, 300 mg, Oral, TID  metoprolol tartrate, 50 mg, Oral, Q12H  multivitamin with minerals, 1 tablet, Oral, Daily  nicotine, 1 patch, Transdermal, Q24H  pantoprazole, 40 mg, Oral, BID  sulfamethoxazole-trimethoprim, 1 tablet, Oral, Q12H  tamsulosin, 0.4 mg, Oral, Nightly        PRN Meds:.  acetaminophen    cyclobenzaprine    HYDROcodone-acetaminophen    ondansetron    polyethylene glycol    sodium chloride    CBC:      Lab 02/16/22  0106 02/14/22  0739 02/14/22  0739   WBC 7.27   < > 18.20*   HEMOGLOBIN 10.2*   < > 13.0   HEMATOCRIT 32.1*   < > 40.1   PLATELETS 144  --  223   NEUTROS ABS 4.81  --  16.42*   IMMATURE GRANS (ABS) 0.03  --  0.10*   LYMPHS ABS 1.13  --  0.49*   MONOS ABS 1.26*  --  1.07*   EOS ABS 0.03  --  0.08   .6*  --  101.3*    < > = values in this interval not displayed.      Basic Metabolic Panel    Sodium Sodium   Date Value Ref Range Status   02/16/2022 133 (L) 136 - 145 mmol/L Final   02/14/2022 135 (L) 136 - " 145 mmol/L Final      Potassium Potassium   Date Value Ref Range Status   02/16/2022 4.9 3.5 - 5.2 mmol/L Final   02/14/2022 4.2 3.5 - 5.2 mmol/L Final      Chloride Chloride   Date Value Ref Range Status   02/16/2022 103 98 - 107 mmol/L Final   02/14/2022 100 98 - 107 mmol/L Final      Bicarbonate No results found for: PLASMABICARB   BUN BUN   Date Value Ref Range Status   02/16/2022 21 8 - 23 mg/dL Final   02/14/2022 24 (H) 8 - 23 mg/dL Final      Creatinine Creatinine   Date Value Ref Range Status   02/16/2022 1.03 0.76 - 1.27 mg/dL Final   02/14/2022 0.90 0.76 - 1.27 mg/dL Final      Calcium Calcium   Date Value Ref Range Status   02/16/2022 8.4 (L) 8.6 - 10.5 mg/dL Final   02/14/2022 9.7 8.6 - 10.5 mg/dL Final      Glucose      No components found for: GLUCOSE.*        Intake/Output                         02/14/22 0701 - 02/15/22 0700 02/15/22 0701 - 02/16/22 0700     [id number] 2090-1367 Total 9221-7896 9664-9238 Total                 Intake    P.O.  --  180 180  240  120 360    I.V.  --  218.3 218.3  --  -- --    IV Piggyback  --  -- --  --  1000 1000    Total Intake -- 398.3 398.3 240 1120 1360       Output    Urine  --  600 600  350  750 1100    Total Output -- 600 600            Neurologic Exam  Awakens to voice, conversant, understands questions and responds appropriately.   Moves all 4's    Assessment/Plan   1. Home today or placement  2. Check with cardiology regarding discharge clearance.  3. Continue PT/OT     TRANG Busby Curtis A, MD

## 2022-02-16 NOTE — CASE MANAGEMENT/SOCIAL WORK
Discharge Planning Assessment  [facility]     Patient Name: Ubaldo Mcleod  MRN: [id number]  Today's Date: 2/16/2022    Admit Date: 2/14/2022     Discharge Needs Assessment     Row Name 02/16/22 0946       Living Environment    Lives With spouse    Name(s) of Who Lives With Patient Bhavna Mcleod Spouse [phone]    Current Living Arrangements home/apartment/condo    Primary Care Provided by self    Provides Primary Care For no one    Family Caregiver if Needed spouse    Family Caregiver Names Bhavna Mcleod Spouse [phone]    Quality of Family Relationships helpful; involved; supportive    Able to Return to Prior Arrangements yes       Resource/Environmental Concerns    Resource/Environmental Concerns none    Transportation Concerns car, none       Transition Planning    Patient/Family Anticipates Transition to inpatient rehabilitation facility    Patient/Family Anticipated Services at Transition ; rehabilitation services    Transportation Anticipated health plan transportation       Discharge Needs Assessment    Readmission Within the Last 30 Days no previous admission in last 30 days    Equipment Currently Used at Home cane, quad; walker, rolling; shower chair; rollator; commode; grab bar; bath bench    Concerns to be Addressed discharge planning    Anticipated Changes Related to Illness none    Discharge Facility/Level of Care Needs acute rehab    Current Discharge Risk physical impairment               Discharge Plan     Row Name 02/16/22 0948       Plan    Plan [facility]    Plan Comments CM spoke with patient at bedside and spouse via phone. Patient resides in [place] with his spouse. Patient is independent with ADL's, however, uses a rolling walker or cane prn for mobility assistance. Patient states he has all DME in his home. Patient denies any current home health or outpatient services. Patient has medical insurance, prescription coverage and is able to afford/obtain  medications without difficulty. Therapy is recommending rehab. Spouse and patient would like a referral to [facility]. Patient was at [facility] in Jan 2022 after a hip fracture. Referral given to tello Muñoz. She will review for admission. CM will continue to follow.    Final Discharge Disposition Code 62 - inpatient rehab facility              Continued Care and Services - Admitted Since 2/14/2022    Coordination has not been started for this encounter.     Selected Continued Care - Prior Encounters Includes selections from prior encounters from 11/16/2021 to 2/16/2022    Discharged on 1/1/2022 Admission date: 12/29/2021 - Discharge disposition: Rehab Facility or Unit (DC - External)    Destination     Service Provider Selected Services Address Phone Fax Patient Preferred    [facility]  Inpatient Rehabilitation [address] [facility] [id number] [phone] --                    Expected Discharge Date and Time     Expected Discharge Date Expected Discharge Time    Feb 17, 2022          Demographic Summary     Row Name 02/16/22 0943       General Information    Arrived From home    Referral Source physician    Reason for Consult discharge planning    Preferred Language English     Used During This Interaction no       Contact Information    Contact Information Comments Bhavna Mcleod Spouse [phone]               Functional Status     Row Name 02/16/22 0945       Functional Status    Usual Activity Tolerance moderate    Current Activity Tolerance --  See PT notes       Functional Status, IADL    Medications independent    Meal Preparation assistive person    Housekeeping assistive person    Laundry assistive person    Shopping assistive equipment and person       Mental Status    General Appearance WDL WDL       Mental Status Summary    Recent Changes in Mental Status/Cognitive Functioning unable to assess       Employment/     Employment Status retired               Psychosocial    No documentation.                Abuse/Neglect    No documentation.                Legal    No documentation.                Substance Abuse    No documentation.                Patient Forms    No documentation.                   Xin Robins RN

## 2022-02-16 NOTE — CASE MANAGEMENT/SOCIAL WORK
Case Management Discharge Note      Final Note: Patient has been accepted and insurance approved to go to [facility] on Thursday, 2/17 for IP rehab per April, liaison, if medically ready. RN to call report to SRU @ [phone]. Transportation will be provided by the [facility] van on Thursday, 2/17 @ 11:30am. Please have patient ready for transport by 11:15am at the Maternity entrance. Patient and spouse both agreeable to discharge plan on Thursday, 2/17.         Selected Continued Care - Admitted Since 2/14/2022     Destination Coordination complete.    Service Provider Selected Services Address Phone Fax Patient Preferred    [facility]  Inpatient Rehabilitation [address] [id number] [phone] --          Durable Medical Equipment    No services have been selected for the patient.              Dialysis/Infusion    No services have been selected for the patient.              Home Medical Care    No services have been selected for the patient.              Therapy    No services have been selected for the patient.              Community Resources    No services have been selected for the patient.              Community & DME    No services have been selected for the patient.                Selected Continued Care - Prior Encounters Includes selections from prior encounters from 11/16/2021 to 2/16/2022    Discharged on 1/1/2022 Admission date: 12/29/2021 - Discharge disposition: Rehab Facility or Unit (DC - External)    Destination     Service Provider Selected Services Address Phone Fax Patient Preferred    [facility]  Inpatient Rehabilitation [address] [id number] [phone] --                         Final Discharge Disposition Code: 62 - inpatient rehab facility

## 2022-02-16 NOTE — PLAN OF CARE
Goal Outcome Evaluation:  Plan of Care Reviewed With: patient           Outcome Summary: Pt alert, Ox4. He completed bed mobility with CGA and required cues for spinal precautions. He completed UB dressing with min assist, sit-to-stand transfer with min assist and retrograde stance noted. Pt s/p L hip hemiathroplasty 12/29 and reports multiple recent falls at home. He has been sleeping on a couch and using briefs to toilet. Recommend IPR.

## 2022-02-16 NOTE — PLAN OF CARE
Goal Outcome Evaluation:  Plan of Care Reviewed With: patient Alert to self and at times place. Hypotensive throughout shift. 1L NS given with no affect on BP. Erika Angel APAC aware and labs obtained. Pt arouses easily from sleep and converses with staff. Dressings to lumbar spine in place. Incontinent of bladder and male purwick placed.

## 2022-02-16 NOTE — PLAN OF CARE
Problem: Adult Inpatient Plan of Care  Goal: Plan of Care Review  Flowsheets (Taken 2/16/2022 1610)  Progress: improving  Plan of Care Reviewed With: patient  Outcome Summary: Pt increased ambulation distance to 25 feet using RW and min A for balance and AD management. Gait limited by fatigue and weakness. Bed mobility performed with CGA and STS with min A. Reviewed HEP, spinal precautions, and logroll technique. Continue recommend pt d/c IPR when appropriate.   Goal Outcome Evaluation:  Plan of Care Reviewed With: patient        Progress: improving  Outcome Summary: Pt increased ambulation distance to 25 feet using RW and min A for balance and AD management. Gait limited by fatigue and weakness. Bed mobility performed with CGA and STS with min A. Reviewed HEP, spinal precautions, and logroll technique. Continue recommend pt d/c IPR when appropriate.

## 2022-02-16 NOTE — PROGRESS NOTES
[facility] at [facility]  Progress Note       LOS: 0 days   Patient Care Team:  Best Garcia DO as PCP - General (Family Medicine)  Best Garcia DO as Referring Physician (Family Medicine)  Glenn Stubbs MD as Consulting Physician (Urology)    Chief Complaint:  Afib     Subjective  pt seems to be more lucid this morning. More alert. Oriented to person, place and asked what time it is. He was not able to identify the current president. Denies CP, SOB, or palpitations. Says back pain is better         Review of Systems:   Pertinent positives in HPI, all others reviewed and negative.      Objective       Current Facility-Administered Medications:   •  acetaminophen (TYLENOL) tablet 500 mg, 500 mg, Oral, Q4H PRN, Kimberly Ortiz PA-C  •  aspirin EC tablet 81 mg, 81 mg, Oral, Daily, Comfort Peña, APRN, 81 mg at 02/15/22 1031  •  bisacodyl (DULCOLAX) suppository 10 mg, 10 mg, Rectal, Daily, Kimberly Ortiz PA-C, 10 mg at 02/15/22 0939  •  celecoxib (CeleBREX) capsule 200 mg, 200 mg, Oral, BID, Kimberly Ortiz PA-C, 200 mg at 02/15/22 2016  •  cyclobenzaprine (FLEXERIL) tablet 10 mg, 10 mg, Oral, TID PRN, Kimberly Ortiz PA-C  •  docusate sodium (COLACE) capsule 100 mg, 100 mg, Oral, BID, Kimberly Ortiz PA-C, 100 mg at 02/15/22 2016  •  DULoxetine (CYMBALTA) DR capsule 30 mg, 30 mg, Oral, Daily, Kimberly Ortiz PA-C, 30 mg at 02/15/22 0813  •  folic acid (FOLVITE) tablet 1,000 mcg, 1,000 mcg, Oral, Daily, Kimberly Ortiz PA-C, 1,000 mcg at 02/15/22 0814  •  gabapentin (NEURONTIN) capsule 300 mg, 300 mg, Oral, TID, Kimberly Ortiz PA-C, 300 mg at 02/15/22 2015  •  HYDROcodone-acetaminophen (NORCO) 7.5-325 MG per tablet 1 tablet, 1 tablet, Oral, Q6H PRN, Kimberly Ortiz PA-C  •  metoprolol tartrate (LOPRESSOR) tablet 50 mg, 50 mg, Oral, Q12H, Domo Lopez MD, 50 mg at 02/15/22 2016  •  multivitamin with minerals 1 tablet, 1 tablet, Oral, Daily,  "Kimberly Ortiz PA-C, 1 tablet at 02/15/22 0814  •  nicotine (NICODERM CQ) 21 MG/24HR patch 1 patch, 1 patch, Transdermal, Q24H, Kimberly Ortiz PA-C, 1 patch at 02/15/22 0815  •  ondansetron (ZOFRAN) injection 4 mg, 4 mg, Intravenous, Q6H PRN, Kimberly Ortiz PA-C, 4 mg at 02/15/22 0939  •  pantoprazole (PROTONIX) EC tablet 40 mg, 40 mg, Oral, BID, Kimberly Ortiz PA-C, 40 mg at 02/15/22 2016  •  polyethylene glycol (MIRALAX) packet 17 g, 17 g, Oral, Daily PRN, Kimberly Ortiz PA-C  •  sodium chloride 0.9 % flush 1-10 mL, 1-10 mL, Intravenous, PRN, Kimberly Ortiz PA-C, 10 mL at 02/14/22 2146  •  sulfamethoxazole-trimethoprim (BACTRIM DS,SEPTRA DS) 800-160 MG per tablet 1 tablet, 1 tablet, Oral, Q12H, Kimberly Ortiz PA-C, 1 tablet at 02/15/22 2016  •  tamsulosin (FLOMAX) 24 hr capsule 0.4 mg, 0.4 mg, Oral, Nightly, Jeanne Ortize AMANDA, PA-C, 0.4 mg at 02/15/22 2016    Vital Sign Min/Max for last 24 hours  Temp  Min: 97.2 °F (36.2 °C)  Max: 99 °F (37.2 °C)   BP  Min: 74/52  Max: 117/83   Pulse  Min: 78  Max: 148   Resp  Min: 14  Max: 18   SpO2  Min: 90 %  Max: 98 %   No data recorded   Weight  Min: 77.8 kg (171 lb 9.6 oz)  Max: 78 kg (171 lb 14.4 oz)     Flowsheet Rows      First Filed Value   Admission Height 175.3 cm (69\") Documented at 02/14/2022 0738   Admission Weight 73 kg (161 lb) Documented at 02/14/2022 0738          Intake/Output Summary (Last 24 hours) at 2/16/2022 0752  Last data filed at 2/15/2022 2327  Gross per 24 hour   Intake 1360 ml   Output 1100 ml   Net 260 ml       Physical Exam:     General Appearance:    Alert, cooperative, in no acute distress   Lungs:    Clear to auscultation bilaterally.  Respiratory effort stable.    Heart:    regular rate and rhythm rhythm normal S1-S2.   Chest Wall:    No abnormalities observed   Abdomen:     Normal bowel sounds, no masses, benign examination.   Extremities:   Moves all extremities well, no edema, no cyanosis, no             " redness   Pulses:   Pulses palpable and equal bilaterally   Skin:   No bleeding, bruising or rash        Results Review:   Results from last 7 days   Lab Units 02/16/22  0106 02/14/22  0739   WBC 10*3/mm3 7.27 18.20*   HEMOGLOBIN g/dL 10.2* 13.0   HEMATOCRIT % 32.1* 40.1   PLATELETS 10*3/mm3 144 223     Results from last 7 days   Lab Units 02/16/22  0106 02/14/22  0739 02/14/22  0739   SODIUM mmol/L 133*  --  135*   POTASSIUM mmol/L 4.9  --  4.2   CHLORIDE mmol/L 103   < > 100   CO2 mmol/L 22.0   < > 26.0   BUN mg/dL 21  --  24*   CREATININE mg/dL 1.03  --  0.90   GLUCOSE mg/dL 111*   < > 129*    < > = values in this interval not displayed.              Results from last 7 days   Lab Units 02/14/22  0739   TSH uIU/mL 1.900                   Intake/Output Summary (Last 24 hours) at 2/16/2022 0752  Last data filed at 2/15/2022 2327  Gross per 24 hour   Intake 1360 ml   Output 1100 ml   Net 260 ml       I personally viewed and interpreted the patient's EKG/Telemetry data    EKG: Twelve-lead EKG 2/16/2022 normal sinus rhythm 79 bpm-reviewed with Dr. Lopez who agrees the patient is in normal rhythm this morning    Telemetry: Normal sinus rhythm 78-86 bpm overnight    Ejection Fraction  No results found for: EF    Echo EF Estimated  Lab Results   Component Value Date    ECHOEFEST 68 02/14/2022       Present on Admission:  **None**    Assessment/Plan   Assessment and Plan:   1. Atrial fibrillation/atrial flutter with RVR  - Unknown duration. Last EKG showing NSR was 12/29/2021. Pt has been seen by several other specialists in the last few weeks, however there has been no mention of afib.    He has had had acute back pain which he has rated at 10/10 for the last few days and his wife states he has been exhausted related to this but he is not specifically aware of palpitations and there does not know how long he has been out of rhythm,   -Echo: 2/14/22 : LVEF 68%, mild to mod pulm HTN, mod TR. Mild aortic valve  sclerosis.   -Thyroid studies: Normal  2/14/22   -Will order sleep study as outpatient  -Zio monitor in place    -There was question that the patient is going on atrial fibrillation while here in the hospital.  -Twelve-lead EKG this morning 2/16/2022 does show normal sinus rhythm.  This was reviewed with Dr. Lopez who agrees.  Will reinitiate flecainide 100 mg twice daily lower metoprolol to 25 mg twice daily given marginal blood pressures. He will need a 12 lead EKG 48 hours after starting flecainide. Order for outpatient EKG  has been placed with appropriate Fax # in the event he is discharged.  Discussed this with the bedside nurse       2. Anticoagulation:   -CHADSVASC: 1 (age) -discussed with Dr. Jessica recio to initiate ASA 81mg 2/15/22- ordered this AM.   -Has bled score: 4, also of note pt has had falls in the last year. Will not pursue NOAC at this time due significant bleeding risk with overall low risk of CVA documented by Renny Vasc Score 1.       3. Kidney disease - listed as stage III per chart review however  Creatinines ~0.9- 1/03this admission      4. Chronic Hep C    5.Tob use/ ETOH use/ Confusion/ Elevated WBC - per primary team     Plan for disposition: Now in NSR on ASA. Flecainide +metoprolol initiated. Will need EKG 48 hours after  Starting flecainide. Order for EKG placed.       Electronically signed by VIKTORIYA Garza, 02/16/22, 9:01 AM EST.

## 2022-02-17 PROBLEM — S32.040A CLOSED COMPRESSION FRACTURE OF L4 LUMBAR VERTEBRA, INITIAL ENCOUNTER (HCC): Status: RESOLVED | Noted: 2022-01-01 | Resolved: 2022-01-01

## 2022-02-17 PROBLEM — S32.050A CLOSED COMPRESSION FRACTURE OF L5 LUMBAR VERTEBRA, INITIAL ENCOUNTER (HCC): Status: RESOLVED | Noted: 2022-01-01 | Resolved: 2022-01-01

## 2022-02-17 NOTE — PLAN OF CARE
Goal Outcome Evaluation:  Plan of Care Reviewed With: patient        Progress: improving  Outcome Summary: VSS, RA, alert and oriented x4, NSR on tele. Up w/ 1 assist. Voiding spontaneously. BM today. Worked w/ PT/OT. Plan to DC to [facility] tomorrow at 1130. CM following. Continue to monitor.

## 2022-02-17 NOTE — PLAN OF CARE
Goal Outcome Evaluation:  Plan of Care Reviewed With: patient Periods of hypotension this shift. Neurology coverage aware. SR on tele. A&Ox4 with occasional reorientation. Ambulating to BR with walker and assist of staff. Dressings to lumbar spine intact.Pt reports BLE numbness at baseline Awaiting transfer to to rehab.

## 2022-02-17 NOTE — CASE MANAGEMENT/SOCIAL WORK
Continued Stay Note  [facility]     Patient Name: Ubaldo Mcleod  MRN: [id number]  Today's Date: 2/17/2022    Admit Date: 2/14/2022     Discharge Plan     Row Name 02/17/22 0814       Plan    Plan [facility]    Plan Comments Addendum:  Received call from April with [facility] saying that Mr. Mcleod is actually going to the Spinal Cord Unit at [facility].  Please call report to  [phone].  Covid test ordered per [facility]'s new protocol.    [facility]  See previous CM final note.  Thank you.    Final Discharge Disposition Code 03 - skilled nursing facility (SNF)               Discharge Codes    No documentation.               Expected Discharge Date and Time     Expected Discharge Date Expected Discharge Time    Feb 17, 2022             Ruma Deshpande RN

## 2022-02-17 NOTE — PROGRESS NOTES
"NEUROSURGERY PROGRESS NOTE    Interval History:   72 yo M   POD 3 following kyphoplasty for L4 and L5 compression fractures, done under moderate sedation on 2/14/22.   He was evaluated for pre-op atrial fibrillation, and for postop confusion and lethargy which was attributed to an undiagnosed UTI.   Minimal back pain per his report.  Was up with PT yesterday and walked short distances, has not yet been up today  Vital Signs  Blood pressure 96/69, pulse 85, temperature 97.7 °F (36.5 °C), temperature source Oral, resp. rate 16, height 180 cm (70.87\"), weight 76.6 kg (168 lb 12.8 oz), SpO2 96 %.    Physical Exam:  Awake, alert, oriented and conversant.   Minimal back pain per his report.  Patient able to roll for inspection of his incisions, covered with dry, clean dressings.      Results Review:    WBC   Date Value Ref Range Status   02/16/2022 7.27 3.40 - 10.80 10*3/mm3 Final     RBC   Date Value Ref Range Status   02/16/2022 3.13 (L) 4.14 - 5.80 10*6/mm3 Final     Hemoglobin   Date Value Ref Range Status   02/16/2022 10.2 (L) 13.0 - 17.7 g/dL Final     Hematocrit   Date Value Ref Range Status   02/16/2022 32.1 (L) 37.5 - 51.0 % Final     MCV   Date Value Ref Range Status   02/16/2022 102.6 (H) 79.0 - 97.0 fL Final     MCH   Date Value Ref Range Status   02/16/2022 32.6 26.6 - 33.0 pg Final     MCHC   Date Value Ref Range Status   02/16/2022 31.8 31.5 - 35.7 g/dL Final     RDW   Date Value Ref Range Status   02/16/2022 14.5 12.3 - 15.4 % Final     RDW-SD   Date Value Ref Range Status   02/16/2022 54.6 (H) 37.0 - 54.0 fl Final     MPV   Date Value Ref Range Status   02/16/2022 11.1 6.0 - 12.0 fL Final     Platelets   Date Value Ref Range Status   02/16/2022 144 140 - 450 10*3/mm3 Final     Neutrophil %   Date Value Ref Range Status   02/16/2022 66.3 42.7 - 76.0 % Final     Lymphocyte %   Date Value Ref Range Status   02/16/2022 15.5 (L) 19.6 - 45.3 % Final     Monocyte %   Date Value Ref Range Status   02/16/2022 17.3 " (H) 5.0 - 12.0 % Final     Eosinophil %   Date Value Ref Range Status   02/16/2022 0.4 0.3 - 6.2 % Final     Basophil %   Date Value Ref Range Status   02/16/2022 0.1 0.0 - 1.5 % Final     Immature Grans %   Date Value Ref Range Status   02/16/2022 0.4 0.0 - 0.5 % Final     Neutrophils, Absolute   Date Value Ref Range Status   02/16/2022 4.81 1.70 - 7.00 10*3/mm3 Final     Lymphocytes, Absolute   Date Value Ref Range Status   02/16/2022 1.13 0.70 - 3.10 10*3/mm3 Final     Monocytes, Absolute   Date Value Ref Range Status   02/16/2022 1.26 (H) 0.10 - 0.90 10*3/mm3 Final     Eosinophils, Absolute   Date Value Ref Range Status   02/16/2022 0.03 0.00 - 0.40 10*3/mm3 Final     Basophils, Absolute   Date Value Ref Range Status   02/16/2022 0.01 0.00 - 0.20 10*3/mm3 Final     Immature Grans, Absolute   Date Value Ref Range Status   02/16/2022 0.03 0.00 - 0.05 10*3/mm3 Final     nRBC   Date Value Ref Range Status   02/16/2022 0.0 0.0 - 0.2 /100 WBC Final     Basic Metabolic Panel    Sodium Sodium   Date Value Ref Range Status   02/16/2022 133 (L) 136 - 145 mmol/L Final      Potassium Potassium   Date Value Ref Range Status   02/16/2022 4.9 3.5 - 5.2 mmol/L Final      Chloride Chloride   Date Value Ref Range Status   02/16/2022 103 98 - 107 mmol/L Final      Bicarbonate No results found for: PLASMABICARB   BUN BUN   Date Value Ref Range Status   02/16/2022 21 8 - 23 mg/dL Final      Creatinine Creatinine   Date Value Ref Range Status   02/16/2022 1.03 0.76 - 1.27 mg/dL Final      Calcium Calcium   Date Value Ref Range Status   02/16/2022 8.4 (L) 8.6 - 10.5 mg/dL Final      Glucose      No components found for: GLUCOSE.*     I/O last 3 completed shifts:  In: 1900 [P.O.:900; IV Piggyback:1000]  Out: 1850 [Urine:1850]  I/O this shift:  In: 300 [P.O.:300]  Out: -       ASSESSMENT:Improvement in back pain and mental status. Able to cooperate with physical therapy. REady for discharge per cardiology with plan for outpatient ekg      PLAN: discharge today from [facility]  Patient may shower  Remove dressings tomorrow, 2/18. These do not need to be replaced.       Delaney Camarena PA-C  02/17/22  09:50 EST

## 2022-02-17 NOTE — PROGRESS NOTES
[facility]  Progress Note       LOS: 1 day   Patient Care Team:  Best Garcia DO as PCP - General (Family Medicine)  Best Garcia DO as Referring Physician (Family Medicine)  Glenn Stubbs MD as Consulting Physician (Urology)    Chief Complaint:  Afib     Subjective  Denies palpitations or CP. Oriented to person and states he is in the he is in the hospital - not sure of date/ specific place.  Still confused today.  Denies any chest pain or shortness of breath or tachypalpitations.  No new complaints at this time.       Review of Systems:   Pertinent positives in HPI, all others reviewed and negative.      Objective       Current Facility-Administered Medications:   •  acetaminophen (TYLENOL) tablet 500 mg, 500 mg, Oral, Q4H PRN, Kimberly Ortiz PA-C  •  aspirin EC tablet 81 mg, 81 mg, Oral, Daily, Comfort Peña, APRN, 81 mg at 02/16/22 0811  •  bisacodyl (DULCOLAX) suppository 10 mg, 10 mg, Rectal, Daily, Kimberly Ortiz PA-C, 10 mg at 02/15/22 0939  •  celecoxib (CeleBREX) capsule 200 mg, 200 mg, Oral, BID, Kimberly Ortiz PA-C, 200 mg at 02/16/22 2048  •  cyclobenzaprine (FLEXERIL) tablet 10 mg, 10 mg, Oral, TID PRN, Kimberly Ortiz PA-C  •  docusate sodium (COLACE) capsule 100 mg, 100 mg, Oral, BID, Kimberly Ortiz PA-C, 100 mg at 02/16/22 2048  •  DULoxetine (CYMBALTA) DR capsule 30 mg, 30 mg, Oral, Daily, Kimberly Ortiz PA-C, 30 mg at 02/16/22 0811  •  flecainide (TAMBOCOR) tablet 100 mg, 100 mg, Oral, Q12H, Comfort Peña, APRN, 100 mg at 02/16/22 2048  •  folic acid (FOLVITE) tablet 1,000 mcg, 1,000 mcg, Oral, Daily, Kimberly Ortiz PA-C, 1,000 mcg at 02/16/22 0811  •  gabapentin (NEURONTIN) capsule 300 mg, 300 mg, Oral, TID, Kimberly Ortiz PA-C, 300 mg at 02/16/22 2048  •  HYDROcodone-acetaminophen (NORCO) 7.5-325 MG per tablet 1 tablet, 1 tablet, Oral, Q6H PRN, Kimberly Ortiz PA-C  •  metoprolol tartrate (LOPRESSOR) tablet  "25 mg, 25 mg, Oral, Q12H, Comfort Peña, APRN, 25 mg at 02/16/22 2048  •  multivitamin with minerals 1 tablet, 1 tablet, Oral, Daily, Kimberly Ortiz PA-C, 1 tablet at 02/16/22 0812  •  nicotine (NICODERM CQ) 21 MG/24HR patch 1 patch, 1 patch, Transdermal, Q24H, Kimberly Ortiz, PA-C, 1 patch at 02/16/22 0813  •  nitrofurantoin (macrocrystal-monohydrate) (MACROBID) capsule 100 mg, 100 mg, Oral, Q12H, Jeanne Ortize AMANDA, PA-C, 100 mg at 02/16/22 2055  •  ondansetron (ZOFRAN) injection 4 mg, 4 mg, Intravenous, Q6H PRN, Jeanne Ortize AMANDA, PA-C, 4 mg at 02/15/22 0939  •  pantoprazole (PROTONIX) EC tablet 40 mg, 40 mg, Oral, BID, Jeanne Ortize AMANDA, PA-C, 40 mg at 02/16/22 2048  •  polyethylene glycol (MIRALAX) packet 17 g, 17 g, Oral, Daily PRN, Jeanne Ortize AMANDA PA-C  •  sodium chloride 0.9 % flush 1-10 mL, 1-10 mL, Intravenous, PRN, Jeanne Ortize A, PA-C, 10 mL at 02/14/22 2146  •  tamsulosin (FLOMAX) 24 hr capsule 0.4 mg, 0.4 mg, Oral, Nightly, Jeanne Ortize A, PA-C, 0.4 mg at 02/16/22 2048    Vital Sign Min/Max for last 24 hours  Temp  Min: 97.6 °F (36.4 °C)  Max: 98.4 °F (36.9 °C)   BP  Min: 88/64  Max: 114/79   Pulse  Min: 81  Max: 88   Resp  Min: 15  Max: 16   SpO2  Min: 96 %  Max: 97 %   No data recorded   Weight  Min: 76.6 kg (168 lb 12.8 oz)  Max: 76.6 kg (168 lb 12.8 oz)     Flowsheet Rows      First Filed Value   Admission Height 175.3 cm (69\") Documented at 02/14/2022 0738   Admission Weight 73 kg (161 lb) Documented at 02/14/2022 0738            Intake/Output Summary (Last 24 hours) at 2/17/2022 0825  Last data filed at 2/17/2022 0418  Gross per 24 hour   Intake 780 ml   Output 1100 ml   Net -320 ml       Physical Exam:     General Appearance:    Alert, cooperative, in no acute distress   Lungs:    Clear to auscultation bilaterally.  Respiratory effort stable    Heart:   Regular rhythm normal S1-S2.  There is an S4.  No new murmurs are appreciated..   Chest Wall:    No abnormalities " observed   Abdomen:     Normal bowel sounds, no masses, benign examination.   Extremities:   Moves all extremities well, no edema, no cyanosis, no             redness   Pulses:   Pulses palpable and equal bilaterally   Skin:   No bleeding, bruising or rash        Results Review:   Results from last 7 days   Lab Units 02/16/22  0106 02/14/22  0739   WBC 10*3/mm3 7.27 18.20*   HEMOGLOBIN g/dL 10.2* 13.0   HEMATOCRIT % 32.1* 40.1   PLATELETS 10*3/mm3 144 223     Results from last 7 days   Lab Units 02/16/22  0106 02/14/22  0739 02/14/22  0739   SODIUM mmol/L 133*  --  135*   POTASSIUM mmol/L 4.9  --  4.2   CHLORIDE mmol/L 103   < > 100   CO2 mmol/L 22.0   < > 26.0   BUN mg/dL 21  --  24*   CREATININE mg/dL 1.03  --  0.90   GLUCOSE mg/dL 111*   < > 129*    < > = values in this interval not displayed.              Results from last 7 days   Lab Units 02/14/22  0739   TSH uIU/mL 1.900                   Intake/Output Summary (Last 24 hours) at 2/17/2022 0825  Last data filed at 2/17/2022 0418  Gross per 24 hour   Intake 780 ml   Output 1100 ml   Net -320 ml       I personally viewed and interpreted the patient's EKG/Telemetry data    EKG: Normal sinus rhythm 86 bpm first-degree AV block MI interval 230 ms QRS at 110 ms.    Telemetry:Afib with RVR -140 bpm, normal sinus rhythm.    Ejection Fraction  No results found for: EF    Echo EF Estimated  Lab Results   Component Value Date    ECHOEFEST 68 02/14/2022         Present on Admission:  • Afib (HCC)    Assessment/Plan   Assessment and Plan:   1. Atrial fibrillation/atrial flutter with RVR  Unknown duration. Last EKG showing NSR was 12/29/2021. Pt has been seen by several other specialists in the last few weeks, however there has been no mention of afib.    He has had had acute back pain which he has rated at 10/10 for the last few days and his wife states he has been exhausted related to this but he is not specifically aware of palpitations and there does not know how  long he has been out of rhythm,   -Echo: 2/14/22 : LVEF 68%, mild to mod pulm HTN, mod TR. Mild aortic valve sclerosis.   -Thyroid studies: Normal  2/14/22   -Will order sleep study as outpatient  -Zio monitor in place    flecainide 100 mg twice daily initiated 2/15/2022.  Now in normal sinus rhythm first-degree AV block.  QRS width stable  - Metoprolol 25 mg BID  -  Order for outpatient EKG  has been placed with appropriate Fax # in the event he is discharged.  Discussed this with the bedside nurse      2. Anticoagulation:   -CHADSVASC: 1 (age) -discussed with Dr. Jessica recio to initiate ASA 81mg 2/15/22- ordered this AM.   -Has Bled score: 4, also of note pt has had falls in the last year. Will not pursue NOAC at this time due significant bleeding risk with overall low risk of CVA documented by Renny Vasc Score 1.       3. Kidney disease - listed as stage III per chart review however  Creatinines ~0.7  In the last year.      4. Chronic Hep C    5.Tob use/ ETOH use/ Confusion/ Elevated WBC - per primary team     Okay for discharge from our standpoint.  We will follow up with us as an outpatient.    Electronically signed by PHU Lazo, 02/17/22, 8:25 AM Domo OSBORNE MD, personally performed the services face to face as described and documented by the above named individual. I have made any necessary edits and it is both accurate and complete 2/17/2022  10:31 EST

## 2022-02-17 NOTE — DISCHARGE SUMMARY
DISCHARGE SUMMARY    Date of Admission: 2/14/2022    Date of Discharge:  2/17/2022    Discharge Diagnosis: Lumbar compression fractures at L4 and L5, s/p vertebroplasty     Atrial fibrillation/atrial flutter with RVR       Procedures Performed:  Procedure(s):  IR vertebroplasty lumbar with fluoroscopy L4, L5  Echocardiogram 2/14/22  LVEF 68%, mild to mod pulm HTN, mod TR. Mild aortic valve sclerosis    Referring Physician: Dr. Ephraim Lopez    Presenting Problem/History of Present Illness  Closed compression fracture of L4 lumbar vertebra, initial encounter ([facility]) [S32.040A]  Closed compression fracture of L5 lumbar vertebra, initial encounter ([facility]) [S32.050A]  Afib ([facility]) [I48.91]     Hospital Course  Patient is a 71 y.o. male who presented with severe back pain due to two acute lumbar compression fractures. He underwent IR vertebroplasty lumbar with fluoroscopy at these levels on 2/14/22. Postoperatively, he was slow to awake and was seen by cardiology for tachycardia and a-fib. He has been started on Zio monitor, flecainide and metoprolol. and ASA; he is a poor candidate for NOAC due to history of falls.    Urinalysis was also done which showed U/A and he was started on macrobid.     He initially had difficulty cooperating with physical therapy, but by POD 3 more alert and cooperative. He has no focal weakness in upper or lower extremities. Voiding spontaneously and BM+.       WBC   Date Value Ref Range Status   02/16/2022 7.27 3.40 - 10.80 10*3/mm3 Final     RBC   Date Value Ref Range Status   02/16/2022 3.13 (L) 4.14 - 5.80 10*6/mm3 Final     Hemoglobin   Date Value Ref Range Status   02/16/2022 10.2 (L) 13.0 - 17.7 g/dL Final     Hematocrit   Date Value Ref Range Status   02/16/2022 32.1 (L) 37.5 - 51.0 % Final     MCV   Date Value Ref Range Status   02/16/2022 102.6 (H) 79.0 - 97.0 fL Final     MCH   Date Value Ref Range Status   02/16/2022 32.6 26.6 - 33.0 pg Final     MCHC   Date Value Ref Range Status    02/16/2022 31.8 31.5 - 35.7 g/dL Final     RDW   Date Value Ref Range Status   02/16/2022 14.5 12.3 - 15.4 % Final     RDW-SD   Date Value Ref Range Status   02/16/2022 54.6 (H) 37.0 - 54.0 fl Final     MPV   Date Value Ref Range Status   02/16/2022 11.1 6.0 - 12.0 fL Final     Platelets   Date Value Ref Range Status   02/16/2022 144 140 - 450 10*3/mm3 Final     Neutrophil %   Date Value Ref Range Status   02/16/2022 66.3 42.7 - 76.0 % Final     Lymphocyte %   Date Value Ref Range Status   02/16/2022 15.5 (L) 19.6 - 45.3 % Final     Monocyte %   Date Value Ref Range Status   02/16/2022 17.3 (H) 5.0 - 12.0 % Final     Eosinophil %   Date Value Ref Range Status   02/16/2022 0.4 0.3 - 6.2 % Final     Basophil %   Date Value Ref Range Status   02/16/2022 0.1 0.0 - 1.5 % Final     Immature Grans %   Date Value Ref Range Status   02/16/2022 0.4 0.0 - 0.5 % Final     Neutrophils, Absolute   Date Value Ref Range Status   02/16/2022 4.81 1.70 - 7.00 10*3/mm3 Final     Lymphocytes, Absolute   Date Value Ref Range Status   02/16/2022 1.13 0.70 - 3.10 10*3/mm3 Final     Monocytes, Absolute   Date Value Ref Range Status   02/16/2022 1.26 (H) 0.10 - 0.90 10*3/mm3 Final     Eosinophils, Absolute   Date Value Ref Range Status   02/16/2022 0.03 0.00 - 0.40 10*3/mm3 Final     Basophils, Absolute   Date Value Ref Range Status   02/16/2022 0.01 0.00 - 0.20 10*3/mm3 Final     Immature Grans, Absolute   Date Value Ref Range Status   02/16/2022 0.03 0.00 - 0.05 10*3/mm3 Final     nRBC   Date Value Ref Range Status   02/16/2022 0.0 0.0 - 0.2 /100 WBC Final     Basic Metabolic Panel    Sodium Sodium   Date Value Ref Range Status   02/16/2022 133 (L) 136 - 145 mmol/L Final      Potassium Potassium   Date Value Ref Range Status   02/16/2022 4.9 3.5 - 5.2 mmol/L Final      Chloride Chloride   Date Value Ref Range Status   02/16/2022 103 98 - 107 mmol/L Final      Bicarbonate No results found for: PLASMABICARB   BUN BUN   Date Value Ref Range  Status   02/16/2022 21 8 - 23 mg/dL Final      Creatinine Creatinine   Date Value Ref Range Status   02/16/2022 1.03 0.76 - 1.27 mg/dL Final      Calcium Calcium   Date Value Ref Range Status   02/16/2022 8.4 (L) 8.6 - 10.5 mg/dL Final      Glucose      No components found for: GLUCOSE.*       Discharge Medications     Discharge Medications      New Medications      Instructions Start Date   aspirin 81 MG EC tablet   81 mg, Oral, Daily      flecainide 100 MG tablet  Commonly known as: TAMBOCOR   100 mg, Oral, 2 Times Daily      metoprolol tartrate 25 MG tablet  Commonly known as: LOPRESSOR   25 mg, Oral, 2 Times Daily      nicotine 21 MG/24HR patch  Commonly known as: NICODERM CQ   1 patch, Transdermal, Every 24 Hours Scheduled   Start Date: February 18, 2022     nitrofurantoin (macrocrystal-monohydrate) 100 MG capsule  Commonly known as: MACROBID   100 mg, Oral, Every 12 Hours Scheduled         Changes to Medications      Instructions Start Date   gabapentin 300 MG capsule  Commonly known as: NEURONTIN  What changed: additional instructions   300 mg, Oral, 3 Times Daily, Start taking QHS x 3 days, then BID x 3 days then TID         Continue These Medications      Instructions Start Date   celecoxib 200 MG capsule  Commonly known as: CeleBREX   TAKE 1 CAPSULE BY MOUTH TWICE DAILY      cyclobenzaprine 10 MG tablet  Commonly known as: FLEXERIL   10 mg, Oral, 3 Times Daily PRN      docusate sodium 100 MG capsule  Commonly known as: COLACE   100 mg, Oral, 2 Times Daily      DULoxetine 30 MG capsule  Commonly known as: CYMBALTA   TAKE ONE CAPSULE BY MOUTH DAILY      folic acid 1 MG tablet  Commonly known as: FOLVITE   TAKE 1 TABLET BY MOUTH EVERY DAY      HYDROcodone-acetaminophen 7.5-325 MG per tablet  Commonly known as: NORCO   1 tablet, Oral, Every 6 Hours PRN      lidocaine 5 %  Commonly known as: LIDODERM   1 patch, Transdermal, Every 24 Hours, Remove & Discard patch within 12 hours or as directed by MD     "  methylPREDNISolone 4 MG dose pack  Commonly known as: MEDROL   Take as directed on package instructions.      pantoprazole 40 MG EC tablet  Commonly known as: PROTONIX   40 mg, Oral, 2 Times Daily      polyethylene glycol 17 g packet  Commonly known as: MIRALAX   17 g, Oral, Daily PRN      Syringe/Needle (Disp) 25G X 1\" 3 ML misc  Commonly known as: B-D 3CC LUER-KISHA SYR 25GX1\"   USE AS DIRECTED EVERY MONTH      tamsulosin 0.4 MG capsule 24 hr capsule  Commonly known as: FLOMAX   TAKE 1 CAPSULE BY MOUTH AT BEDTIME             Assessment and Plan:  Discharge to [facility] today    1. Lumbar compression fractures at L4 and L5,     S/p vertebroplasty   OK to shower with assistance   Remove dressings tomorrow; no need to replace   Follow up with pain management; no neurosurgery follow up scheduled  2. Atrial fibrillation/atrial flutter with RVR   -Echo: 2/14/22 : LVEF 68%, mild to mod pulm HTN, mod TR. Mild aortic valve sclerosis.    zio monitor in place   Continue flecainide metoprolol and ASA   Order for outpatient 12 lead EKG placed to be performed at 48hrs after starting flecainide, to be faxed to [facility]   Follow up 1 month with [facility] 3/17/22 at 1315  3. Kidney disease stable  4. Chronic Hep C  5 Tobacco use/ETOH use         Delaney Camarena PA-C  02/17/22  11:34 EST  "

## 2022-02-22 NOTE — PROGRESS NOTES
"Enter Query Response Below      Query Response:     Unable to Determine         If applicable, please update the problem list.     Patient: Ubaldo Mcleod        : 1950  Account: [id number]           Admit Date: 2022        Options to Respond to Query:    1. Access the Encounter     a. From the To-Do Side bar, click Respond With Note.     b. Click New Note     c. Answer query within the yellow box.                d. Update the Problem List if applicable.     Dr. Lopez    71 year old male admitted 22 with L4 & 5 lumbar fractures requiring kyphoplasty. 2/15 progress note \"I suspect that a large portion of the patient's lethargy and confusion yesterday was attributable to his previously undiagnosed UTI\".  urine culture is positive for e.coli. Patient's GCS 13 (2000)- 14 (2/15)- 15 (), Treatments- Bactrim (2/15-), Macrobid (-), discharged home on Macrobid. UTI and the patients mental status are not noted on the discharge summary.     Please clarify if patient treated/monitored for one more of the following:  Metabolic encephalopathy due to UTI  Lethargy and confusion due to UTI  Other- specify______  Unable to determine    By submitting this query, we are merely seeking further clarification of documentation to accurately reflect all conditions that you are monitoring, evaluating, treating or that extend the hospitalization or utilize additional resources of care. Please utilize your independent clinical judgment when addressing the question(s) above.     This query and your response, once completed, will be entered into the legal medical record.    Sincerely,  Hyun COSTELLO, RN, CCDS  [email or web address]  Clinical Documentation Integrity  "

## 2022-02-24 NOTE — TELEPHONE ENCOUNTER
Caller: MELECIO    Relationship: [facility] WHERE PT IS CURRENTLY IN PT    Best call back number: [phone]    What was the call regarding: MELECIO CALLED TO RESCHEDULE PTS APPT WITH DR. GARCIA.  PTS APPT WAS ORIGINALLY SCHEDULED A F/U 1 WEEK OUT.  F/A APPT WITH DR. GARCIA WAS 03/07/22.  I SCHEDULED PT FOR THIS DATE BUT IT IS OUTSIDE OF 1 WEEK.  DOES PT NEED TO BE SCHEDULED SOONER.      Do you require a callback:     IF PT REQUIRES A SOONER APPT PLEASE CALL MELECIO @ [facility]    THANK YOU

## 2022-03-04 NOTE — TELEPHONE ENCOUNTER
Dr Lopez reviewed Zio Patch Results.  Patient had a 9.3 second pause confirmed by Yen at [facility] that it was at 4pm.  Due to the results, Dr. Lopez recommends a DC PPM.  Order has been entered.  Patient and wife aware of GFT recommendations and verbalized understanding.

## 2022-03-07 PROBLEM — R00.1 BRADYCARDIA, SINUS: Status: ACTIVE | Noted: 2022-01-01

## 2022-03-07 NOTE — TELEPHONE ENCOUNTER
Caller: Bhavna Mcleod    Relationship: Emergency Contact    Best call back number: [phone]    What is the best time to reach you: ANYTIME AND OK TO LVM     Who are you requesting to speak with (clinical staff, provider,  specific staff member): DR LEW OR MA      What was the call regarding: PT WIFE CALLED WANTING TO KNOW IF PT NEEDS TO COME IN FOR APPT ON 3/10/22. PT IS DOING WELL ON MYRBETRIQ TRIAL  AND WOULD LIKE TO CONTINUE WITH THIS COURSE OF TREATMENT.     Do you require a callback: YES

## 2022-03-07 NOTE — TELEPHONE ENCOUNTER
LVM for pt on his phone number, and the alternative number as well, that his appointment today is cancelled due to surgery 2 weeks ago. Azra has requested that he can call in a couple of months once he's recovered more, or he can call as needed.    HUB: PLEASE ADVISE PT OF THIS IF HE CALLS BACK.

## 2022-03-08 NOTE — TELEPHONE ENCOUNTER
Patient's wife Bhavna updated and aware, voiced understanding of this and will call the office back with any further questions.

## 2022-03-10 NOTE — PROGRESS NOTES
Follow Up Office Visit      Patient Name: Ubaldo Mcleod  : 1950   MRN: [id number]     Chief Complaint:    Chief Complaint   Patient presents with   • Urinary Urgency         History of Present Illness: Ubaldo Mcleod is a 71 y.o. male who presents today for follow up of lower urinary tract symptoms including urinary urgency, urge associated incontinence.  He has a complex past medical history with multiple recent surgical procedures.  At last follow-up he was started on Myrbetriq 25 mg.  He reports significant improvement in urinary urgency and urge associated incontinence.  He is pleased with symptomatic improvement.  PVR today 42 mL.    Subjective      Review of System: Review of Systems   Constitutional: Negative for chills, fatigue, fever and unexpected weight change.   HENT: Negative for sore throat.    Eyes: Negative for visual disturbance.   Respiratory: Negative for cough, chest tightness and shortness of breath.    Cardiovascular: Negative for chest pain and leg swelling.   Gastrointestinal: Negative for blood in stool, constipation, diarrhea, nausea, rectal pain and vomiting.   Genitourinary: Positive for frequency and urgency. Negative for decreased urine volume, difficulty urinating, dysuria, enuresis, flank pain, genital sores and hematuria.   Musculoskeletal: Negative for back pain and joint swelling.   Skin: Negative for rash and wound.   Neurological: Negative for seizures, speech difficulty, weakness and headaches.   Psychiatric/Behavioral: Negative for confusion, sleep disturbance and suicidal ideas. The patient is not nervous/anxious.       I have reviewed the ROS documented by my clinical staff, updated as appropriate and I agree. Glenn Stubbs MD    I have reviewed and the following portions of the patient's history were updated as appropriate: past family history, past medical history, past social history, past surgical history and problem list.    Medications:      Current Outpatient Medications:   •  aspirin (aspirin) 81 MG EC tablet, Take 1 tablet by mouth Daily., Disp: 30 tablet, Rfl: 6  •  celecoxib (CeleBREX) 200 MG capsule, TAKE 1 CAPSULE BY MOUTH TWICE DAILY, Disp: 60 capsule, Rfl: 2  •  cyclobenzaprine (FLEXERIL) 10 MG tablet, Take 1 tablet by mouth 3 (Three) Times a Day As Needed for Muscle Spasms., Disp: 90 tablet, Rfl: 0  •  docusate sodium (COLACE) 100 MG capsule, Take 100 mg by mouth 2 (Two) Times a Day., Disp: , Rfl:   •  DULoxetine (CYMBALTA) 30 MG capsule, TAKE ONE CAPSULE BY MOUTH DAILY, Disp: 60 capsule, Rfl: 0  •  flecainide (TAMBOCOR) 100 MG tablet, Take 1 tablet by mouth 2 (Two) Times a Day., Disp: 60 tablet, Rfl: 11  •  folic acid (FOLVITE) 1 MG tablet, TAKE 1 TABLET BY MOUTH EVERY DAY, Disp: 90 tablet, Rfl: 3  •  gabapentin (NEURONTIN) 300 MG capsule, Take 1 capsule by mouth 3 (Three) Times a Day. Start taking QHS x 3 days, then BID x 3 days then TID (Patient taking differently: Take 300 mg by mouth 3 (Three) Times a Day. Takes gabapentin 300 mg morning, afternoon and 600 mg po q hs.), Disp: 90 capsule, Rfl: 1  •  HYDROcodone-acetaminophen (NORCO) 7.5-325 MG per tablet, Take 1 tablet by mouth Every 6 (Six) Hours As Needed for Moderate Pain ., Disp: 30 tablet, Rfl: 0  •  lidocaine (LIDODERM) 5 %, Place 1 patch on the skin as directed by provider Daily. Remove & Discard patch within 12 hours or as directed by MD, Disp: 30 each, Rfl: 0  •  methylPREDNISolone (MEDROL) 4 MG dose pack, Take as directed on package instructions., Disp: 21 tablet, Rfl: 0  •  metoprolol tartrate (LOPRESSOR) 25 MG tablet, Take 1 tablet by mouth 2 (Two) Times a Day., Disp: 60 tablet, Rfl: 5  •  nicotine (NICODERM CQ) 21 MG/24HR patch, Place 1 patch on the skin as directed by provider Daily., Disp: 28 each, Rfl: 0  •  pantoprazole (PROTONIX) 40 MG EC tablet, Take 1 tablet by mouth 2 (Two) Times a Day., Disp: 180 tablet, Rfl: 1  •  polyethylene glycol (MIRALAX) 17 g packet, Take  "17 g by mouth Daily As Needed (constipation)., Disp:  , Rfl:   •  Syringe/Needle, Disp, (B-D 3CC LUER-KISHA SYR 25GX1\") 25G X 1\" 3 ML misc, USE AS DIRECTED EVERY MONTH, Disp: 10 each, Rfl: 1  •  tamsulosin (FLOMAX) 0.4 MG capsule 24 hr capsule, TAKE 1 CAPSULE BY MOUTH AT BEDTIME, Disp: 90 capsule, Rfl: 0  •  Mirabegron ER (Myrbetriq) 25 MG tablet sustained-release 24 hour 24 hr tablet, Take 1 tablet by mouth Daily., Disp: 30 tablet, Rfl: 2    Allergies:   No Known Allergies      Post void residual bladder scan:   42mL     Objective     Physical Exam:   Vital Signs:   Vitals:    03/10/22 1114   Pulse: 72   SpO2: 98%   Weight: 76.2 kg (168 lb)   Height: 180 cm (70.87\")   PainSc: 0-No pain     Body mass index is 23.52 kg/m².     Physical Exam  Vitals and nursing note reviewed.   Constitutional:       Appearance: Normal appearance.   HENT:      Head: Normocephalic and atraumatic.   Cardiovascular:      Comments: Well perfused  Pulmonary:      Effort: Pulmonary effort is normal.   Abdominal:      General: Abdomen is flat.      Palpations: Abdomen is soft.   Skin:     General: Skin is warm and dry.   Neurological:      General: No focal deficit present.      Mental Status: He is alert and oriented to person, place, and time. Mental status is at baseline.   Psychiatric:         Mood and Affect: Mood normal.         Behavior: Behavior normal.         Thought Content: Thought content normal.         Judgment: Judgment normal.         Labs:   Brief Urine Lab Results  (Last result in the past 365 days)      Color   Clarity   Blood   Leuk Est   Nitrite   Protein   CREAT   Urine HCG        02/14/22 1755 Dark Yellow   Cloudy   Negative   Moderate (2+)   Positive   Negative                 Urine Culture    Urine Culture 2/14/22   Urine Culture >100,000 CFU/mL Escherichia coli (A)   (A) Abnormal value               Lab Results   Component Value Date    GLUCOSE 111 (H) 02/16/2022    CALCIUM 8.4 (L) 02/16/2022     (L) 02/16/2022 "    K 4.9 02/16/2022    CO2 22.0 02/16/2022     02/16/2022    BUN 21 02/16/2022    CREATININE 1.03 02/16/2022    EGFRIFNONA 71 02/16/2022    BCR 20.4 02/16/2022    ANIONGAP 8.0 02/16/2022       Lab Results   Component Value Date    WBC 7.27 02/16/2022    HGB 10.2 (L) 02/16/2022    HCT 32.1 (L) 02/16/2022    .6 (H) 02/16/2022     02/16/2022         Measures:   Tobacco:   Ubaldo Mcleod  reports that he has been smoking cigarettes. He started smoking about 56 years ago. He has been smoking about 1.50 packs per day for the past 0.00 years. He has never used smokeless tobacco.. I have educated him on the risk of diseases from using tobacco products.    Assessment / Plan      Assessment/Plan:   71 y.o. male is seen today for follow up of lower urinary tract symptoms, urinary urgency and urge associated incontinence.  PVR today 42 mL.  At last follow-up he was initiated on mirabegron 25 mg.  He reports significant symptomatic improvement, decrease in urgency and urge associated continence.  He is very pleased in symptoms.  We have discussed continuing this medication.  We have discussed risks and benefits of this medication.  He will follow-up in 3 months for a symptom check.  He is understanding agreeable plan of care..     Diagnoses and all orders for this visit:    1. Urge incontinence  -     Mirabegron ER (Myrbetriq) 25 MG tablet sustained-release 24 hour 24 hr tablet; Take 1 tablet by mouth Daily.  Dispense: 30 tablet; Refill: 2    2. Urinary urgency  -     Mirabegron ER (Myrbetriq) 25 MG tablet sustained-release 24 hour 24 hr tablet; Take 1 tablet by mouth Daily.  Dispense: 30 tablet; Refill: 2         Follow Up:   Return in about 3 months (around 6/10/2022).     I spent approximately 30 minutes providing clinical care for this patient; including review of patient's chart and provider documentation, face to face time spent with patient in examination room (obtaining history, performing  physical exam, discussing diagnosis and management options), placing orders, and completing patient documentation.     Glenn Stubbs MD  [facility] [place]

## 2022-03-14 NOTE — PROGRESS NOTES
NAME: SHORTY NINO   DOS: 3/14/2022  : 1950  PCP: Best Garcia DO    Chief Complaint:    Chief Complaint   Patient presents with   • Follow-up     S/p L4 and L5 kyphoplasty         History of Present Illness:  71 y.o. male who is status post L4/5 kyphoplasty on 2022.  He did go to [facility] following his procedure, and presents today for routine follow-up.  His pain is markedly improved, currently taking only Mobic for pain control.  He is able to ambulate with assistance of a walker, and is planning to continue outpatient physical therapy at beginning this week, and hopefully his ambulation will continue to improve now that his pain is under better control.  He denies any unilateral weakness.  No bowel or bladder changes.    Past Medical History:  Past Medical History:   Diagnosis Date   • Chronic hepatitis C with cirrhosis (HCC)    • ED (erectile dysfunction) of non-organic origin    • GERD (gastroesophageal reflux disease)    • Hemochromatosis    • History of cirrhosis    • Homocysteinemia    • Porphyria cutanea tarda (HCC)    • Weight loss        Past Surgical History:  Past Surgical History:   Procedure Laterality Date   • CATARACT EXTRACTION WITH INTRAOCULAR LENS IMPLANT      dec 2016 - has 3 surgries on left eye and 1 on the right eye.    • COLONOSCOPY     • ENDOSCOPY  2016   • EYE SURGERY     • HIP HEMIARTHROPLASTY Left 2021    Procedure: HIP HEMIARTHROPLASTY LEFT;  Surgeon: Brenden Sharp Jr., MD;  Location: [facility] OR;  Service: Orthopedics;  Laterality: Left;   • HIP TROCHANTERIC NAILING WITH INTRAMEDULLARY HIP SCREW Right 2021    Procedure: HIP TROCHANTERIC NAILING WITH INTRAMEDULLARY HIP SCREW RIGHT;  Surgeon: Brenden Sharp Jr., MD;  Location: [facility] OR;  Service: Orthopedics;  Laterality: Right;   • INTERVENTIONAL RADIOLOGY PROCEDURE N/A 2022    Procedure: IR vertebroplasty lumbar with fluoroscopy L4, L5;  Surgeon: Ephraim Lopez MD;  Location:   [facility];  Service: Interventional Radiology;  Laterality: N/A;   • LIVER BIOPSY     • REFRACTIVE SURGERY  06/2017    follow up surgery on both eyes        Review of Systems:        Review of Systems   Constitutional: Negative for activity change, appetite change, chills, diaphoresis, fatigue, fever and unexpected weight change.   HENT: Positive for congestion and sinus pressure. Negative for dental problem, drooling, ear discharge, ear pain, facial swelling, hearing loss, mouth sores, nosebleeds, postnasal drip, rhinorrhea, sinus pain, sneezing, sore throat, tinnitus, trouble swallowing and voice change.    Eyes: Negative for photophobia, pain, discharge, redness, itching and visual disturbance.   Respiratory: Positive for cough and shortness of breath. Negative for apnea, choking, chest tightness, wheezing and stridor.    Cardiovascular: Positive for leg swelling. Negative for chest pain and palpitations.   Gastrointestinal: Negative for abdominal distention, abdominal pain, anal bleeding, blood in stool, constipation, diarrhea, nausea, rectal pain and vomiting.   Endocrine: Negative for cold intolerance, heat intolerance, polydipsia, polyphagia and polyuria.   Genitourinary: Positive for frequency. Negative for decreased urine volume, difficulty urinating, dysuria, enuresis, flank pain, genital sores, hematuria and urgency.   Musculoskeletal: Positive for back pain. Negative for arthralgias, gait problem, joint swelling, myalgias, neck pain and neck stiffness.   Skin: Negative for color change, pallor, rash and wound.   Allergic/Immunologic: Negative for environmental allergies, food allergies and immunocompromised state.   Neurological: Negative for dizziness, tremors, seizures, syncope, facial asymmetry, speech difficulty, weakness, light-headedness, numbness and headaches.   Hematological: Negative for adenopathy. Does not bruise/bleed easily.   Psychiatric/Behavioral: Negative for agitation,  behavioral problems, confusion, decreased concentration, dysphoric mood, hallucinations, self-injury, sleep disturbance and suicidal ideas. The patient is not nervous/anxious and is not hyperactive.         Medications    Current Outpatient Medications:   •  aspirin (aspirin) 81 MG EC tablet, Take 1 tablet by mouth Daily., Disp: 30 tablet, Rfl: 6  •  cyclobenzaprine (FLEXERIL) 10 MG tablet, Take 1 tablet by mouth 3 (Three) Times a Day As Needed for Muscle Spasms., Disp: 90 tablet, Rfl: 0  •  docusate sodium (COLACE) 100 MG capsule, Take 100 mg by mouth 2 (Two) Times a Day. As needed, Disp: , Rfl:   •  DULoxetine (CYMBALTA) 30 MG capsule, TAKE ONE CAPSULE BY MOUTH DAILY, Disp: 60 capsule, Rfl: 0  •  flecainide (TAMBOCOR) 100 MG tablet, Take 1 tablet by mouth 2 (Two) Times a Day., Disp: 60 tablet, Rfl: 11  •  folic acid (FOLVITE) 1 MG tablet, TAKE 1 TABLET BY MOUTH EVERY DAY, Disp: 90 tablet, Rfl: 3  •  gabapentin (NEURONTIN) 300 MG capsule, Take 1 capsule by mouth 3 (Three) Times a Day. Start taking QHS x 3 days, then BID x 3 days then TID (Patient taking differently: Take 300 mg by mouth 3 (Three) Times a Day. Taking nightly), Disp: 90 capsule, Rfl: 1  •  HYDROcodone-acetaminophen (NORCO) 7.5-325 MG per tablet, Take 1 tablet by mouth Every 6 (Six) Hours As Needed for Moderate Pain ., Disp: 30 tablet, Rfl: 0  •  lidocaine (LIDODERM) 5 %, Place 1 patch on the skin as directed by provider Daily. Remove & Discard patch within 12 hours or as directed by MD, Disp: 30 each, Rfl: 0  •  meloxicam (MOBIC) 15 MG tablet, Take 15 mg by mouth Daily., Disp: , Rfl:   •  metoprolol tartrate (LOPRESSOR) 25 MG tablet, Take 1 tablet by mouth 2 (Two) Times a Day. (Patient taking differently: Take 25 mg by mouth Daily. In the morning), Disp: 60 tablet, Rfl: 5  •  Mirabegron ER (Myrbetriq) 25 MG tablet sustained-release 24 hour 24 hr tablet, Take 1 tablet by mouth Daily., Disp: 30 tablet, Rfl: 2  •  pantoprazole (PROTONIX) 40 MG EC  "tablet, Take 1 tablet by mouth 2 (Two) Times a Day., Disp: 180 tablet, Rfl: 1  •  polyethylene glycol (MIRALAX) 17 g packet, Take 17 g by mouth Daily As Needed (constipation)., Disp:  , Rfl:   •  Syringe/Needle, Disp, (B-D 3CC LUER-KISHA SYR 25GX1\") 25G X 1\" 3 ML misc, USE AS DIRECTED EVERY MONTH, Disp: 10 each, Rfl: 1  •  tamsulosin (FLOMAX) 0.4 MG capsule 24 hr capsule, TAKE 1 CAPSULE BY MOUTH AT BEDTIME, Disp: 90 capsule, Rfl: 0  •  vitamin B-12 (CYANOCOBALAMIN) 100 MCG tablet, Take  by mouth Daily. Patient taking 1 mg, Disp: , Rfl:   •  meloxicam (MOBIC) 15 MG tablet, Take 1 tablet by mouth Daily., Disp: 90 tablet, Rfl: 3  •  methylPREDNISolone (MEDROL) 4 MG dose pack, Take as directed on package instructions., Disp: 21 tablet, Rfl: 0  •  nicotine (NICODERM CQ) 21 MG/24HR patch, Place 1 patch on the skin as directed by provider Daily., Disp: 28 each, Rfl: 0    Allergies:  No Known Allergies    Social Hx:  Social History     Tobacco Use   • Smoking status: Current Every Day Smoker     Packs/day: 1.00     Years: 0.00     Pack years: 0.00     Types: Cigarettes     Start date: 1966   • Smokeless tobacco: Never Used   • Tobacco comment:  · 1 - 1 1/2 PACKS PER DAY, FOR THE PAST 50 YEARS   Vaping Use   • Vaping Use: Never used   Substance Use Topics   • Alcohol use: Yes     Comment: 1-2 glasses of bourbon every night   • Drug use: Never       Family Hx:  Family History   Problem Relation Age of Onset   • Cancer Mother    • No Known Problems Father    • No Known Problems Other    • Cancer Sister    • Diabetes Brother    • Stroke Brother        Review of Imaging:  No new imaging.    Physical Examination:  Vitals:    03/14/22 1518   BP: 120/70   Temp: 98.2 °F (36.8 °C)        General Appearance:   Well developed, well nourished, well groomed, alert, and cooperative.  Cardiovascular: Regular rate and rhythm. No carotid bruits      Neurological examination:  Neurologic Exam     Mental Status   Oriented to person, place, and " time.   Speech: speech is normal   Level of consciousness: alert    Cranial Nerves   Cranial nerves II through XII intact.     Motor Exam 4/5 strength in the upper and lower extremities.  He ambulates with assistance of a walker, but uses a wheelchair for longer distances.     Sensory Exam   Light touch normal.     Gait, Coordination, and Reflexes Ambulates with assistance of a walker, uses wheelchair for longer distances.       Diagnoses/Plan:    Mr. Mcleod is a 71 y.o. male status L4/5 kyphoplasty for compression fractures.  His pain is markedly improved following the procedures, and he currently is only taking Mobic for pain control.  He is now able to ambulate with assistance of a walker, and is scheduled to have further physical therapy beginning later this week.  From a neurosurgical standpoint, he has no restrictions in regards to his physical therapy/activity.  He will follow-up on a as needed basis.

## 2022-03-15 NOTE — TELEPHONE ENCOUNTER
Per your instruction, I called and made the patient's wife aware to have him stop Flecainide and Metoprolol. He has not had the sleep study done yet. Do you want him to have it done prior to the procedure?

## 2022-03-16 PROBLEM — F09 MILD COGNITIVE DISORDER: Status: ACTIVE | Noted: 2022-01-01

## 2022-03-16 NOTE — PROGRESS NOTES
Follow Up Office Visit      Patient Name: Ubaldo Mcleod  : 1950   MRN: [id number]     Chief Complaint:    Chief Complaint   Patient presents with   • Cough       History of Present Illness: Ubaldo Mcleod is a 71 y.o. male who is here today to follow up with bronchitis type of symptoms for 3 weeks.  He has had a cough and it is productive.  He is coughing up sputum that is green to yellow.  He feels sick overall.  Has not been treated with antibiotics but has been taking Mucinex which does help but has not resolved his symptoms.    Patient is here with his wife today who helps with the history.  Patient will be following up with cardiology soon for atrial fibrillation.  They are asking me if he needs to continue with metoprolol or flecainide.  They report that his atrial fib was caused by steroids and they wonder if he needs to stay away from steroids.  Patient has had a Holter monitor.  He had a lapse in heartbeat per the wife.  He is being evaluated for the need of a pacemaker and they say now the cardiologist wants him to have a sleep study done.  Today we discussed importance of following through with sleep study, and discussing with the cardiologist further management of his atrial fibrillation.    He also has multiple fractures in his vertebrae.  They are wondering if he has osteoporosis and he does not have a DEXA scan on file.  We will get a DEXA scan given his vertebral fracture history.    He also has vitamin B-12 deficiency.  He is placed on oral supplements and his wife is wondering if he needs to be on injections or oral supplementation.  We have not checked his vitamin B 12 level recently so today we discussed rechecking level in repeating it in the future to see if we do need to go back to injections.      Review of systems positive for cough      Physical exam: Patient's oropharynx normal.  Patient has dentures in place.  Patient's bilateral ear exam was normal without  bulging or red TM.  Patient's lung exam showed wheezing bilaterally and rhonchi.      Subjective        I have reviewed and the following portions of the patient's history were updated as appropriate: past family history, past medical history, past social history, past surgical history and problem list.    Medications:     Current Outpatient Medications:   •  aspirin (aspirin) 81 MG EC tablet, Take 1 tablet by mouth Daily., Disp: 30 tablet, Rfl: 6  •  cyclobenzaprine (FLEXERIL) 10 MG tablet, Take 1 tablet by mouth 3 (Three) Times a Day As Needed for Muscle Spasms., Disp: 90 tablet, Rfl: 0  •  docusate sodium (COLACE) 100 MG capsule, Take 100 mg by mouth 2 (Two) Times a Day. As needed, Disp: , Rfl:   •  DULoxetine (CYMBALTA) 30 MG capsule, TAKE ONE CAPSULE BY MOUTH DAILY, Disp: 60 capsule, Rfl: 0  •  flecainide (TAMBOCOR) 100 MG tablet, Take 1 tablet by mouth 2 (Two) Times a Day., Disp: 60 tablet, Rfl: 11  •  folic acid (FOLVITE) 1 MG tablet, TAKE 1 TABLET BY MOUTH EVERY DAY, Disp: 90 tablet, Rfl: 3  •  gabapentin (NEURONTIN) 300 MG capsule, Take 1 capsule by mouth 3 (Three) Times a Day. Start taking QHS x 3 days, then BID x 3 days then TID (Patient taking differently: Take 300 mg by mouth 3 (Three) Times a Day. Taking nightly), Disp: 90 capsule, Rfl: 1  •  HYDROcodone-acetaminophen (NORCO) 7.5-325 MG per tablet, Take 1 tablet by mouth Every 6 (Six) Hours As Needed for Moderate Pain ., Disp: 30 tablet, Rfl: 0  •  lidocaine (LIDODERM) 5 %, Place 1 patch on the skin as directed by provider Daily. Remove & Discard patch within 12 hours or as directed by MD, Disp: 30 each, Rfl: 0  •  meloxicam (MOBIC) 15 MG tablet, Take 1 tablet by mouth Daily., Disp: 90 tablet, Rfl: 3  •  methylPREDNISolone (MEDROL) 4 MG dose pack, Take as directed on package instructions., Disp: 21 tablet, Rfl: 0  •  metoprolol tartrate (LOPRESSOR) 25 MG tablet, Take 1 tablet by mouth 2 (Two) Times a Day. (Patient taking differently: Take 25 mg by  "mouth Daily. In the morning), Disp: 60 tablet, Rfl: 5  •  Mirabegron ER (Myrbetriq) 25 MG tablet sustained-release 24 hour 24 hr tablet, Take 1 tablet by mouth Daily., Disp: 30 tablet, Rfl: 2  •  nicotine (NICODERM CQ) 21 MG/24HR patch, Place 1 patch on the skin as directed by provider Daily., Disp: 28 each, Rfl: 0  •  pantoprazole (PROTONIX) 40 MG EC tablet, Take 1 tablet by mouth 2 (Two) Times a Day., Disp: 180 tablet, Rfl: 1  •  polyethylene glycol (MIRALAX) 17 g packet, Take 17 g by mouth Daily As Needed (constipation)., Disp:  , Rfl:   •  Syringe/Needle, Disp, (B-D 3CC LUER-KISHA SYR 25GX1\") 25G X 1\" 3 ML misc, USE AS DIRECTED EVERY MONTH, Disp: 10 each, Rfl: 1  •  tamsulosin (FLOMAX) 0.4 MG capsule 24 hr capsule, TAKE 1 CAPSULE BY MOUTH AT BEDTIME, Disp: 90 capsule, Rfl: 0  •  vitamin B-12 (CYANOCOBALAMIN) 100 MCG tablet, Take  by mouth Daily. Patient taking 1 mg, Disp: , Rfl:   •  amoxicillin-clavulanate (Augmentin) 875-125 MG per tablet, Take 1 tablet by mouth 2 (Two) Times a Day., Disp: 14 tablet, Rfl: 0    Allergies:   No Known Allergies    Objective     Physical Exam: Please see above  Vital Signs:   Vitals:    03/16/22 1105   BP: 122/70   Pulse: 91   Temp: 97.7 °F (36.5 °C)   TempSrc: Temporal   SpO2: 94%   Weight: Comment: unable to get in wheelchair   Height: 175.3 cm (69\")   PainSc: 0-No pain     Body mass index is 25.1 kg/m².          Assessment / Plan      Assessment/Plan:   Diagnoses and all orders for this visit:    1. B12 deficiency (Primary)  -     Vitamin B12; Future    2. Special screening for osteoporosis  -     DEXA Bone Density Axial; Future    3. Collapsed vertebra, not elsewhere classified, thoracic region, initial encounter for fracture (HCC)   -     DEXA Bone Density Axial; Future    4. Bronchitis  -     amoxicillin-clavulanate (Augmentin) 875-125 MG per tablet; Take 1 tablet by mouth 2 (Two) Times a Day.  Dispense: 14 tablet; Refill: 0    Will treat as bronchitis.  Patient will be " treated with Augmentin.  We will have to consider asthma in the future if his cough continues.  However he does have yellow to green sputum today.    We will test patient's bone density given his vertebral fractures.    Checking B12 level today.  We will recheck in 3 months to see how oral supplementation is working for the patient.  If not working will go back to injections    Advised patient to follow-up with cardiology for further management of atrial fibrillation.  They report that he has stopped flecainide and metoprolol, and this is fine as long as this was under the guidance of cardiology.  They also are concerned about the sleep referral which I agree with given his symptoms.  We need to rule out any other disease process that could be causing abnormal heart rhythms or absent beats.  I urged patient to follow-up with sleep medicine and cardiology for further management.    Follow Up:   Return in about 3 months (around 6/16/2022), or if symptoms worsen or fail to improve.    Best Garcia, DO  [facility]

## 2022-03-21 NOTE — PROGRESS NOTES
[facility]   OFFICE NOTE      Ubaldo Mcleod  1950  PCP: Best Garcia DO    SUBJECTIVE:   Ubaldo Mcleod is a 71 y.o. male seen for a follow up visit regarding the following:     CC:Afib    HPI:   71-year-old gentleman presents today with his wife to discuss recent findings of atrial fibrillation and abnormal ZIO monitor revealing severe tachybradycardia syndrome with 9-second pauses.  Patient is in the hospital with severe back pain required vertebroplasty following hip surgery.  Patient reports he has had random episodes of sudden syncope or dizziness prior to this admission.  In the hospital he is found to have atrial fibrillation spontaneously converted to sinus rhythm.  He was placed on flecainide and metoprolol and a monitor was placed.  However now these medications been stopped due to concerns of severe tachybradycardia syndrome.  He presents today for further discussing the options of pursuing a pacemaker.  He is continued physical therapy for his back and his hip he is progressing well and getting stronger.    Cardiac PMH: (Old records have been reviewed and summarized below)  1. Atrial Fibrillation   a. CHADSVASC: 1  b. Echocardiogram February 14, 2022 EF 68%.  Mild to moderate pulmonary hypertension, moderate TR mild aortic valve sclerosis with no stenosis  c. Diagnosed in the setting of acute pain/ back surgery 2/14/22   d. Currently flecainide, Tambocor on hold secondary to severe bradycardia, pauses patient is on aspirin daily.  2. Tachybradycardia syndrome, ZIO monitor revealing over 9-second pauses on flecainide and Tambocor.  Plan for permanent pacemaker implantation.  3. Chronic kidney disease  4. Chronic Hepatitis C  5. Vertebral compression fractures, continue rehab chronic back pain limited activities and deconditioning  6. Recent hip surgery continue rehab  7. Current tobacco use  8. BPH  9. GERD  10. Surgical History   a. Hip fracture  repair Jan 2021  b. Hip fracture repair Dec 2021       Past Medical History, Past Surgical History, Family history, Social History, and Medications were all reviewed with the patient today and updated as necessary.       Current Outpatient Medications:   •  aspirin (aspirin) 81 MG EC tablet, Take 1 tablet by mouth Daily., Disp: 30 tablet, Rfl: 6  •  cyclobenzaprine (FLEXERIL) 10 MG tablet, Take 1 tablet by mouth 3 (Three) Times a Day As Needed for Muscle Spasms., Disp: 90 tablet, Rfl: 0  •  docusate sodium (COLACE) 100 MG capsule, Take 100 mg by mouth 2 (Two) Times a Day. As needed, Disp: , Rfl:   •  DULoxetine (CYMBALTA) 30 MG capsule, TAKE ONE CAPSULE BY MOUTH DAILY, Disp: 60 capsule, Rfl: 0  •  flecainide (TAMBOCOR) 100 MG tablet, Take 1 tablet by mouth 2 (Two) Times a Day., Disp: 60 tablet, Rfl: 11  •  folic acid (FOLVITE) 1 MG tablet, TAKE 1 TABLET BY MOUTH EVERY DAY, Disp: 90 tablet, Rfl: 3  •  gabapentin (NEURONTIN) 300 MG capsule, Take 1 capsule by mouth 3 (Three) Times a Day. Start taking QHS x 3 days, then BID x 3 days then TID (Patient taking differently: Take 300 mg by mouth 3 (Three) Times a Day. Taking nightly), Disp: 90 capsule, Rfl: 1  •  HYDROcodone-acetaminophen (NORCO) 7.5-325 MG per tablet, Take 1 tablet by mouth Every 6 (Six) Hours As Needed for Moderate Pain ., Disp: 30 tablet, Rfl: 0  •  lidocaine (LIDODERM) 5 %, Place 1 patch on the skin as directed by provider Daily. Remove & Discard patch within 12 hours or as directed by MD, Disp: 30 each, Rfl: 0  •  meloxicam (MOBIC) 15 MG tablet, Take 1 tablet by mouth Daily., Disp: 90 tablet, Rfl: 3  •  methylPREDNISolone (MEDROL) 4 MG dose pack, Take as directed on package instructions., Disp: 21 tablet, Rfl: 0  •  metoprolol tartrate (LOPRESSOR) 25 MG tablet, Take 1 tablet by mouth 2 (Two) Times a Day. (Patient taking differently: Take 25 mg by mouth Daily. In the morning), Disp: 60 tablet, Rfl: 5  •  pantoprazole (PROTONIX) 40 MG EC tablet, Take 1  "tablet by mouth 2 (Two) Times a Day., Disp: 180 tablet, Rfl: 1  •  polyethylene glycol (MIRALAX) 17 g packet, Take 17 g by mouth Daily As Needed (constipation)., Disp:  , Rfl:   •  Syringe/Needle, Disp, (B-D 3CC LUER-KISHA SYR 25GX1\") 25G X 1\" 3 ML misc, USE AS DIRECTED EVERY MONTH, Disp: 10 each, Rfl: 1  •  tamsulosin (FLOMAX) 0.4 MG capsule 24 hr capsule, TAKE 1 CAPSULE BY MOUTH AT BEDTIME, Disp: 90 capsule, Rfl: 0  •  vitamin B-12 (CYANOCOBALAMIN) 100 MCG tablet, Take  by mouth Daily. Patient taking 1 mg, Disp: , Rfl:   •  Mirabegron ER (Myrbetriq) 25 MG tablet sustained-release 24 hour 24 hr tablet, Take 1 tablet by mouth Daily., Disp: 30 tablet, Rfl: 2      No Known Allergies      PHYSICAL EXAM:    /74 (BP Location: Left arm, Patient Position: Sitting)   Pulse 107   Ht 175.3 cm (69\")   Wt 78.9 kg (174 lb)   SpO2 99%   BMI 25.70 kg/m²        Wt Readings from Last 5 Encounters:   03/21/22 78.9 kg (174 lb)   03/14/22 77.1 kg (170 lb)   03/10/22 76.2 kg (168 lb)   02/17/22 76.6 kg (168 lb 12.8 oz)   02/10/22 78 kg (172 lb)       BP Readings from Last 5 Encounters:   03/21/22 108/74   03/16/22 122/70   03/14/22 120/70   02/17/22 96/69   02/09/22 144/80       General appearance - Alert, well appearing, and in no distress   Mental status - Affect appropriate to mood.  Eyes - Sclerae anicteric,  ENMT - Hearing grossly normal bilaterally, Dental hygiene good.  Neck - Carotids upstroke normal bilaterally, no bruits, no JVD.  Resp - Clear to auscultation, no wheezes, rales or rhonchi, symmetric air entry.  Heart - Normal rate, regular rhythm, normal S1, S2, no murmurs, rubs, clicks or gallops.  GI - Soft, nontender, nondistended, no masses or organomegaly.  Neurological - Grossly intact - normal speech, no focal findings  Musculoskeletal - No joint tenderness, deformity or swelling, no muscular tenderness noted.  Extremities - Peripheral pulses normal, no pedal edema, no clubbing or cyanosis.  Skin - Normal " coloration and turgor.  Psych -  oriented to person, place, and time.    Medical problems and test results were reviewed with the patient today.     No results found for this or any previous visit (from the past 672 hour(s)).      EKG: (EKG has been independently visualized by me and summarized below)    ECG 12 Lead    Date/Time: 3/21/2022 4:11 PM  Performed by: Jim Galloway PA  Authorized by: Jim Galloway PA   Rhythm: sinus tachycardia  Rate: tachycardic  Conduction: conduction normal  ST Segments: ST segments normal  QRS axis: normal    Clinical impression: normal ECG              ASSESSMENT   1. Tachybradycardia syndrome: Over 9 seconds pauses on flecainide and metoprolol on ZIO monitor.  Patient reports prior to this admission random episodes of sudden dizziness that could be related to these events.  2. PAF: One episode in setting of recent surgery, steroids.  EKG today reveals sinus tachycardia.  He was remained off of his Tambocor and metoprolol due to sick sinus syndrome, pauses on ZIO monitor  3. Normal Echocardiogram EF 68%.  4. Chadsvasc=1, Asa daily.   5. Tobacco abuse, ETOH discussed need for cessation.  Patient reports he is doing well with this at this time.  5. THOMAS, patient is scheduled for CPAP in the near future.  6.     PLAN  · Patient is scheduled for a permanent pacemaker implantation.  The patient comes today with multiple questions along with his wife regarding this procedure.  Their main concern is whether he is strong enough to pursue the procedure he appears to be progressing well physical therapy.  We discussed the recovery phase of a typical pacemaker.  In addition the risk and benefits of this procedure were explained in detail and the patient is a agreeable to proceed.      3/21/2022  16:14 EDT  Will Laverne COSTELLO

## 2022-03-29 NOTE — TELEPHONE ENCOUNTER
Reason for Call: PT IS REQUESTING A CALL BACK REGARDING MEDICATIONS. HE STATES THAT [facility] IS MISSING AN RX FOR AMOXICILLIN, BUT I DON'T SEE THAT ON HIS MED LIST. PLEASE ADVISE.     Symptoms:     Onset (when it began):    Does anything make it better /  worse?    Have they tried anything?    Other pertinent info:    Okay to wait for PCP to address

## 2022-04-04 PROBLEM — G47.19 EXCESSIVE DAYTIME SLEEPINESS: Status: ACTIVE | Noted: 2022-01-01

## 2022-04-04 PROBLEM — G47.33 OSA (OBSTRUCTIVE SLEEP APNEA): Status: ACTIVE | Noted: 2022-01-01

## 2022-04-04 NOTE — PROGRESS NOTES
Ubaldo Mcleod is a 71 y.o. male.   Chief Complaint   Patient presents with   • Sleeping Problem       HPI     71 y.o. male seen in consultation at the request of Dr Lopez for evaluation of the above.     He received a diagnosis of atrial fibrillation in February of this year.  He has had recent back and hip surgery and while in the hospital was found of atrial fibrillation.  He was placed on flecainide and metoprolol and a ZIO monitor was placed.  He was found to have significant bradycardia with 9-second pauses.  As result his flecainide and metoprolol were placed on hold.  He is scheduled for a permanent pacemaker implant in the next several weeks.  Echocardiogram revealed a normal EF.    He has significant problems with several years of snoring and nonrestorative sleep.  His wife notes which she describes as relatively light snoring and has not witnessed any apneas.  He does have a dry mouth in the morning.  He has frequent awakenings at night.  He averages 8 hours of sleep per night.  It takes him 30 to 60 minutes to get to sleep and he typically will take a 30 to 60-minute nap during the day.    [place] Scale is: 8/24    The patient's relevant past medical, surgical, family, and social history reviewed and updated in Epic as appropriate.    Current medications are:   Current Outpatient Medications:   •  aspirin (aspirin) 81 MG EC tablet, Take 1 tablet by mouth Daily., Disp: 30 tablet, Rfl: 6  •  cyclobenzaprine (FLEXERIL) 10 MG tablet, Take 1 tablet by mouth 3 (Three) Times a Day As Needed for Muscle Spasms., Disp: 90 tablet, Rfl: 0  •  docusate sodium (COLACE) 100 MG capsule, Take 100 mg by mouth 2 (Two) Times a Day. As needed, Disp: , Rfl:   •  doxycycline (MONODOX) 100 MG capsule, Take 1 capsule by mouth 2 (Two) Times a Day., Disp: 14 capsule, Rfl: 0  •  DULoxetine (CYMBALTA) 30 MG capsule, TAKE ONE CAPSULE BY MOUTH DAILY, Disp: 60 capsule, Rfl: 0  •  folic acid (FOLVITE) 1 MG tablet, TAKE 1  "TABLET BY MOUTH EVERY DAY, Disp: 90 tablet, Rfl: 3  •  lidocaine (LIDODERM) 5 %, Place 1 patch on the skin as directed by provider Daily. Remove & Discard patch within 12 hours or as directed by MD, Disp: 30 each, Rfl: 0  •  meloxicam (MOBIC) 15 MG tablet, Take 1 tablet by mouth Daily., Disp: 90 tablet, Rfl: 3  •  Mirabegron ER (Myrbetriq) 25 MG tablet sustained-release 24 hour 24 hr tablet, Take 1 tablet by mouth Daily., Disp: 30 tablet, Rfl: 2  •  pantoprazole (PROTONIX) 40 MG EC tablet, Take 1 tablet by mouth 2 (Two) Times a Day., Disp: 180 tablet, Rfl: 1  •  polyethylene glycol (MIRALAX) 17 g packet, Take 17 g by mouth Daily As Needed (constipation)., Disp:  , Rfl:   •  Syringe/Needle, Disp, (B-D 3CC LUER-KISHA SYR 25GX1\") 25G X 1\" 3 ML misc, USE AS DIRECTED EVERY MONTH, Disp: 10 each, Rfl: 1  •  tamsulosin (FLOMAX) 0.4 MG capsule 24 hr capsule, TAKE 1 CAPSULE BY MOUTH AT BEDTIME, Disp: 90 capsule, Rfl: 0  •  vitamin B-12 (CYANOCOBALAMIN) 100 MCG tablet, Take 2 tablets by mouth Daily. Patient taking 1 mg, Disp: 180 tablet, Rfl: 0.    Review of Systems    Review of Systems  ROS documented in patient questionnaire ×14 systems.  Reviewed with patient.  Otherwise negative except as noted in HPI.    Physical Exam    Blood pressure 148/77, pulse 95, height 175.3 cm (69\"), weight 82.6 kg (182 lb), SpO2 95 %. Body mass index is 26.88 kg/m².    Physical Exam  Vitals and nursing note reviewed.   Constitutional:       Appearance: Normal appearance. He is well-developed.   HENT:      Head: Normocephalic and atraumatic.      Nose: Nose normal.      Mouth/Throat:      Mouth: Mucous membranes are moist.      Pharynx: Oropharynx is clear. No oropharyngeal exudate.      Comments: Class IV airway  Upper/lower denture plates  Eyes:      General: No scleral icterus.     Conjunctiva/sclera: Conjunctivae normal.   Neck:      Thyroid: No thyromegaly.      Trachea: No tracheal deviation.   Cardiovascular:      Rate and Rhythm: Normal " rate and regular rhythm.      Heart sounds: No murmur heard.    No friction rub. No gallop.   Pulmonary:      Effort: Pulmonary effort is normal. No respiratory distress.      Breath sounds: No wheezing or rales.   Musculoskeletal:         General: No deformity. Normal range of motion.   Skin:     General: Skin is warm and dry.      Findings: No rash.   Neurological:      Mental Status: He is alert and oriented to person, place, and time.   Psychiatric:         Behavior: Behavior normal.         Thought Content: Thought content normal.         DATA:    Reviewed Cardiology note dated 3/21/2022    2/16/2022 bicarbonate 22 and hemoglobin 10.2    ASSESSMENT:    Problem List Items Addressed This Visit        Pulmonary Problems    THOMAS (obstructive sleep apnea) - possible    Relevant Orders    Polysomnography 4 or More Parameters       Other    Excessive daytime sleepiness - Primary    Relevant Orders    Polysomnography 4 or More Parameters          71-year-old male with evidence of obstructive sleep apnea.  He has disturbed sleep and nonrestorative sleep.  His wife has noted snoring but no specific apneas.  He does awaken with a dry mouth.  He does have recently diagnosed atrial fibrillation as well as tachybradycardia syndrome requiring a pacemaker.    I certainly think that further work-up and treatment for obstructive sleep apnea is warranted in his case.    PLAN:    1. I discussed the possible diagnosis of THOMAS.  I went over the possible association between undiagnosed THOMAS and his sleep symptoms as well as his recent cardiac history.  2. He understands the benefits of treatment of THOMAS and long-term risks of a lack of treatment  3. I went over the diagnostic process as well as treatment options for THOMAS including CPAP therapy  4. He and his wife are not ready to schedule a polysomnogram as of yet as he has his pacemaker coming up in the next week or so and some other testing.  He is also going through rehab.  He thinks  he may be ready in a month or so.  5. Once his polysomnogram is complete will advise further and arrange appropriate follow-up in the sleep center.    I have reviewed the results of my evaluation and impression and discussed my recommendations in detail with the patient.    Level of Risk Moderate due to: undiagnosed new problem     Moderate risk of morbidity due to the possibility of untreated sleep apnea.      Signed by  Paras Degroot MD    April 4, 2022      CC: Best Garcia DO          No ref. provider found

## 2022-04-08 NOTE — DISCHARGE INSTRUCTIONS

## 2022-04-08 NOTE — PAT
Patient to apply Chlorhexadine wipes  to surgical area (as instructed) the night before procedure and the AM of procedure. Wipes provided.    Sushila in dr lenz's office notified of recent bronchitis and finished doxycycline 4-7-22,

## 2022-04-11 NOTE — H&P
Cardiology H&P     Ubaldo Mcleod  1950  [phone]    04/11/22    DATE OF ADMISSION: 4/11/2022  [facility] Best Roth DO  [address] [address] / [facility] [id number]    CC: Afib/ Tachy- Dimas Syndrome     Problem List:     1. Atrial Fibrillation   a. CHADSVASC: 1  b. Echocardiogram February 14, 2022 EF 68%.  Mild to moderate pulmonary hypertension, moderate TR mild aortic valve sclerosis with no stenosis  c. Diagnosed in the setting of acute pain/ back surgery 2/14/22   d. March 2022 Tambocor on hold secondary to severe bradycardia, pauses patient is on aspirin daily.  2. Tachybradycardia syndrome, ZIO monitor revealing over 9-second pauses on Tambocor.  Plan for permanent pacemaker implantation.  3. History of Chronic kidney disease  4. Chronic Hepatitis C  5. Vertebral compression fractures, continue rehab chronic back pain limited activities and deconditioning  6. Recent hip surgery continue rehab  7. Current tobacco use  8. BPH  9. GERD  10. Surgical History   a. Hip fracture repair Jan 2021  b. Hip fracture repair Dec 2021       History of Present Illness: Mr. Mcleod is a 71 to  male with atrial fibrillation, tachycardia-bradycardia syndrome including recent ZIO monitor with 9-second pause while on flecainide, chronic kidney disease, chronic hepatitis C and history of vertebral compression fractures.  Patient was diagnosed with atrial fibrillation in the setting of severe pack back pain requiring vertebroplasty by Dr. Ephraim hammonds.  Patient was initiated on flecainide and metoprolol and a monitor was placed.  These medications were discontinued due to concerns of severe tachycardia-bradycardia syndrome.  Pacemaker implant was recommended.  Patient has largely recovered from his recent hip/back surgeries.  He has been working with physical therapy and is gotten stronger and his pain is under much better control.  He does note several episodes of profound  dizziness as well as syncope.    Patient wife note that he has had bronchitis in the last month.  He initially took Augmentin x10 days was still having symptoms of green sputum, shortness of breath, cough.  Then he took doxycycline x7 days.  He finished doxycycline 2 days ago.  Patient continues with cough, shortness of breath.  He also notes increased lower extremity edema in the last 3 days.  No fevers.    Regards to anticoagulation he has a [id number] 4 of 1 and is on baby aspirin.  Last dose was this morning.      Allergies   Allergen Reactions   • Versed [Midazolam] Mental Status Change     Pt became violent, and was confused for several days after receiving this medication.       Prior to Admission Medications     Prescriptions Last Dose Informant Patient Reported? Taking?    aspirin (aspirin) 81 MG EC tablet  Medication Bottle No No    Take 1 tablet by mouth Daily.    cyclobenzaprine (FLEXERIL) 10 MG tablet  Medication Bottle No No    Take 1 tablet by mouth 3 (Three) Times a Day As Needed for Muscle Spasms.    docusate sodium (COLACE) 100 MG capsule  Medication Bottle Yes No    Take 100 mg by mouth 2 (Two) Times a Day. As needed    DULoxetine (CYMBALTA) 30 MG capsule  Medication Bottle No No    TAKE ONE CAPSULE BY MOUTH DAILY    Patient taking differently:  Take 30 mg by mouth Daily.    folic acid (FOLVITE) 1 MG tablet  Medication Bottle No No    TAKE 1 TABLET BY MOUTH EVERY DAY    Patient taking differently:  Take 1 mg by mouth Daily.    lidocaine (LIDODERM) 5 %  Self No No    Place 1 patch on the skin as directed by provider Daily. Remove & Discard patch within 12 hours or as directed by MD    Patient taking differently:  Place 1 patch on the skin as directed by provider Daily As Needed for Moderate Pain  (back). Remove & Discard patch within 12 hours or as directed by MD    meloxicam (MOBIC) 15 MG tablet  Medication Bottle No No    Take 1 tablet by mouth Daily.    Mirabegron ER (Myrbetriq) 25 MG tablet  sustained-release 24 hour 24 hr tablet  Medication Bottle No No    Take 1 tablet by mouth Daily.    pantoprazole (PROTONIX) 40 MG EC tablet  Medication Bottle No No    Take 1 tablet by mouth 2 (Two) Times a Day.    polyethylene glycol (MIRALAX) 17 g packet  Self No No    Take 17 g by mouth Daily As Needed (constipation).    tamsulosin (FLOMAX) 0.4 MG capsule 24 hr capsule  Self No No    TAKE 1 CAPSULE BY MOUTH AT BEDTIME    Patient taking differently:  1 capsule Daily.    vitamin B-12 (CYANOCOBALAMIN) 1000 MCG tablet  Medication Bottle Yes No    Take 1,000 mcg by mouth Daily.            Current Facility-Administered Medications:   •  acetaminophen (TYLENOL) tablet 650 mg, 650 mg, Oral, Q4H PRN, Nicole Ott PA  •  ceFAZolin in dextrose (ANCEF) IVPB solution 2 g, 2 g, Intravenous, Once, Nicole Ott PA  •  nitroglycerin (NITROSTAT) SL tablet 0.4 mg, 0.4 mg, Sublingual, Q5 Min PRN, Nicole Ott PA  •  sodium chloride 0.9 % flush 10 mL, 10 mL, Intravenous, PRN, Nicole Ott PA  •  sodium chloride 0.9 % flush 3 mL, 3 mL, Intravenous, Q12H, Nicole Ott PA  •  sodium chloride 0.9 % infusion, 1 mL/kg/hr (Order-Specific), Intravenous, Continuous, Nicole Ott PA    Social History     Socioeconomic History   • Marital status:    Tobacco Use   • Smoking status: Current Every Day Smoker     Packs/day: 0.75     Years: 50.00     Pack years: 37.50     Types: Cigarettes     Start date: 1966   • Smokeless tobacco: Never Used   • Tobacco comment:  · 1 - 1 1/2 PACKS PER DAY, FOR THE PAST 50 YEARS   Vaping Use   • Vaping Use: Never used   Substance and Sexual Activity   • Alcohol use: Not Currently     Comment: 1-2 glasses of bourbon every night   • Drug use: Yes     Types: Marijuana     Comment: last use 2-2022   • Sexual activity: Defer     Partners: Female       Family History   Problem Relation Age of Onset   • Cancer Mother    • Hypertension Father    • Heart disease Father    • Cancer Sister    •  Hypertension Brother    • Heart disease Brother    • Diabetes Brother    • Stroke Brother    • No Known Problems Other        REVIEW OF SYSTEMS:   CONST:  No weight loss, fever, chills, weakness or+ fatigue.   HEENT:  No visual loss, blurred vision, double vision, yellow sclerae.                   No hearing loss, congestion, sore throat.   SKIN:      No rashes, urticaria, ulcers, sores.     RESP:     + shortness of breath, No hemoptysis, + cough, + yellow/green sputum.   GI:           No anorexia, nausea, vomiting, diarrhea. No abdominal pain, melena.   :         No burning on urination, hematuria or increased frequency.  ENDO:    No diaphoresis, cold or heat intolerance. No polyuria or polydipsia.   NEURO:  No headache, +dizziness,+ syncope,No  paralysis, ataxia, or parasthesias.                  No change in bowel or bladder control. No history of CVA/TIA  MUSC:    No muscle, +back pain,+ joint pain and  stiffness.   HEME:    No anemia, bleeding, bruising. No history of DVT/PE.  PSYCH:  No history of depression, anxiety    Vitals:    04/11/22 0643 04/11/22 0644 04/11/22 0645 04/11/22 0646   BP: 139/83  145/90    BP Location: Right arm  Left arm    Patient Position: Lying  Lying    Pulse:    103   Resp:    18   Temp:    98.2 °F (36.8 °C)   TempSrc:    Temporal   SpO2:  94%  95%   Weight:       Height:             Vital Sign Min/Max for last 24 hours  Temp  Min: 98.2 °F (36.8 °C)  Max: 98.2 °F (36.8 °C)   BP  Min: 139/83  Max: 145/90   Pulse  Min: 103  Max: 103   Resp  Min: 18  Max: 18   SpO2  Min: 94 %  Max: 95 %   No data recorded    No intake or output data in the 24 hours ending 04/11/22 0720          Physical Exam:  GEN: Well nourished, Well- developed  No acute distress  HEENT: Normocephalic, Atraumatic, PERRLA, moist mucous membranes  NECK: supple, NO JVD, no thyromegaly, no lymphadenopathy  CARD: S1S2  RRR no murmur, gallop, rub  LUNGS: Clear to auscultataion, normal respiratory effort + wheezing    ABDOMEN: Soft, nontender, normal bowel sounds  EXTREMITIES:No gross deformities,  No clubbing, cyanosis, or +2 LE edema Bilaterally   SKIN: Warm, dry  NEURO: No focal deficits  PSYCHIATRIC: Normal affect and mood      I personally viewed and interpreted the patient's EKG/Telemetry/lab data    Data:   Results from last 7 days   Lab Units 04/08/22  1321   WBC 10*3/mm3 7.12   HEMOGLOBIN g/dL 10.8*   HEMATOCRIT % 33.5*   PLATELETS 10*3/mm3 292     Results from last 7 days   Lab Units 04/08/22  1321   SODIUM mmol/L 139   POTASSIUM mmol/L 4.0   CHLORIDE mmol/L 103   CO2 mmol/L 26.0   BUN mg/dL 13   CREATININE mg/dL 0.76   GLUCOSE mg/dL 110*      Results from last 7 days   Lab Units 04/08/22  1321   HEMOGLOBIN A1C % 4.60*             Results from last 7 days   Lab Units 04/08/22  1321   PROTIME Seconds 13.8   INR  1.07     Chest X-Ray:  Imaging Results (Last 24 Hours)     ** No results found for the last 24 hours. **          Telemetry: Sinus tachycardia  bpm         Assessment and Plan:   1.  Atrial brdnaykgzqjm-htzvrfxxmbw-fwyggycowps syndrome  --Echo: 2/14/22 : LVEF 68%, mild to mod pulm HTN, mod TR. Mild aortic valve sclerosis.   -Recent diagnosis of paroxysmal atrial fibrillation in the setting of hip/back surgery and steroid use initiated on Tambocor and metoprolol February 2022  -Patient was discharged with a monitor which revealed a subsequent 9-second pause.  Tambocor and flecainide were discontinued.  Patient had symptoms of dizziness pacemaker implant recommended  -Patient presents today for pacemaker implant.  The risk, benefits, alternatives of pacemaker implant have been reviewed with the patient and the wife at the bedside.  They wish to proceed.      2.  Anticoagulation: [id number] equals 1 on aspirin 81 mg daily. Last dose was this morning    3. Recent bronchitis   -pt took 10 days of Augmentin then just finished 7 days of doxycycline. Continues with wheezing, cough, SOB. No recent Chest Xray.    -DuoNeb now.     3. Tobacco/alcohol use: Still currently smoking about 1ppd. Drinks 2 mixed drinks per day.     4.  Possible abscess struct of sleep apnea.  Recent consult with Dr. Degroot sleep studies pending    5. HX of chronic kidney disease noted: Cr today normal      Electronically signed by VIKTORIYA Garza, 04/11/22, 7:03 AM EDT.    I have read the above note and agree

## 2022-04-11 NOTE — PLAN OF CARE
Goal Outcome Evaluation:  Plan of Care Reviewed With: patient        Progress: improving  Outcome Evaluation: A & O x 4, NSR/occ Apaced, RA, no c/o pain, dressing CDI/sling in place, Palmer 19, Falls score 23/bed alarm active, will continue to monitor

## 2022-04-12 NOTE — PROGRESS NOTES
[facility]  Progress Note     LOS: 0 days   Patient Care Team:  Best Garcia DO as PCP - General (Family Medicine)  Best Garcia DO as Referring Physician (Family Medicine)  Glenn Stubbs MD as Consulting Physician (Urology)    Chief Complaint:  Afib/ Tachy bryn syndrome     Subjective  PT doing well overnight. NO CP this Morning. Says his breathing is better. Wants to go home.  No new complaints    Interval History: s/p SJM DDD PPM implant     Review of Systems:   Pertinent positives in HPI, all others reviewed and negative.      Current Facility-Administered Medications:   •  acetaminophen (TYLENOL) tablet 650 mg, 650 mg, Oral, Q4H PRN, 650 mg at 04/11/22 2148 **OR** acetaminophen (TYLENOL) suppository 650 mg, 650 mg, Rectal, Q4H PRN, Domo Lopez MD  •  aspirin EC tablet 81 mg, 81 mg, Oral, Daily, Domo Lopez MD, 81 mg at 04/11/22 1758  •  cyclobenzaprine (FLEXERIL) tablet 10 mg, 10 mg, Oral, TID PRN, Domo Lopez MD  •  docusate sodium (COLACE) capsule 100 mg, 100 mg, Oral, BID, Domo Lopez MD, 100 mg at 04/11/22 2148  •  ipratropium-albuterol (DUO-NEB) nebulizer solution 3 mL, 3 mL, Nebulization, 4x Daily - RT, Domo Lopez MD, 3 mL at 04/11/22 1949  •  ipratropium-albuterol (DUO-NEB) nebulizer solution 3 mL, 3 mL, Nebulization, Once, Comfort Peña APRN  •  meloxicam (MOBIC) tablet 15 mg, 15 mg, Oral, Daily, Domo Lopez MD, 15 mg at 04/11/22 1758  •  nicotine (NICODERM CQ) 14 MG/24HR patch 1 patch, 1 patch, Transdermal, Q24H, Comfort Peña APRN, 1 patch at 04/11/22 1758  •  ondansetron (ZOFRAN) injection 4 mg, 4 mg, Intravenous, Q6H PRN, Domo Lopez MD  •  pantoprazole (PROTONIX) EC tablet 40 mg, 40 mg, Oral, BID, Domo Lopez MD, 40 mg at 04/11/22 2143  •  sodium chloride 0.9 % flush 10 mL, 10 mL, Intravenous, PRN, Domo Lopez MD  •  sodium chloride 0.9 % flush 3 mL, 3 mL, Intravenous, Q12H, John  "Domo PARISH MD, 3 mL at 04/11/22 2148  •  sotalol (BETAPACE) tablet 80 mg, 80 mg, Oral, Q12H, Domo Lopez MD, 80 mg at 04/11/22 2304  •  tamsulosin (FLOMAX) 24 hr capsule 0.4 mg, 0.4 mg, Oral, Nightly, Domo Lopez MD, 0.4 mg at 04/11/22 2143      Objective     Vital Sign Min/Max for last 24 hours  Temp  Min: 98.1 °F (36.7 °C)  Max: 98.7 °F (37.1 °C)   BP  Min: 90/66  Max: 137/87   Pulse  Min: 88  Max: 108   Resp  Min: 10  Max: 28   SpO2  Min: 88 %  Max: 99 %   Flow (L/min)  Min: 1  Max: 2   No data recorded     Flowsheet Rows    Flowsheet Row First Filed Value   Admission Height 175.3 cm (69\") Documented at 04/11/2022 0638   Admission Weight 83.5 kg (184 lb 1.4 oz) Documented at 04/11/2022 0638        Physical Exam:     General Appearance:    Alert, cooperative, in no acute distress   Lungs:    Decreased breath sounds at bases expiratory wheezes improved    Heart:   Regular rhythm normal S1-S2.  There is an S4.   Chest Wall:    No abnormalities observed   Abdomen:     Normal bowel sounds, no masses,  soft non-tender, non-distended,    Extremities:   No gross deformities, +1 Le  Edema bilaterally, no cyanosis,    Pulses:   Pulses palpable and equal bilaterally   Skin:   Left chest Implant site clean dry intact without signs of hematoma or infection.  No hematoma or swelling noted at the pacemaker site.        Results Review:   Results from last 7 days   Lab Units 04/08/22  1321   WBC 10*3/mm3 7.12   HEMOGLOBIN g/dL 10.8*   HEMATOCRIT % 33.5*   PLATELETS 10*3/mm3 292     Results from last 7 days   Lab Units 04/08/22  1321   SODIUM mmol/L 139   POTASSIUM mmol/L 4.0   CHLORIDE mmol/L 103   CO2 mmol/L 26.0   BUN mg/dL 13   CREATININE mg/dL 0.76   GLUCOSE mg/dL 110*      Results from last 7 days   Lab Units 04/08/22  1321   HEMOGLOBIN A1C % 4.60*               Results from last 7 days   Lab Units 04/08/22  1321   PROTIME Seconds 13.8   INR  1.07           Intake/Output Summary (Last 24 hours) at 4/12/2022 " 0647  Last data filed at 4/12/2022 0644  Gross per 24 hour   Intake 600 ml   Output 2375 ml   Net -1775 ml       I personally viewed and interpreted the patient's EKG/Telemetry data    Chest X-ray: no penumothorax; acceptable lead position. Pulm vascular congestion noted with small left sided pleural effusion. Asymmetric pulm edema and PNA are in the differential.     Telemetry:NSR  BPM     EKG: NSR 84BPM  ms     Present on Admission:  **None**    Assessment/Plan     Assessment and Plan:   1.  Atrial ekzmdakmzptn-kacsaajmhwj-xwkxnranycr syndrome  --Echo: 2/14/22 : LVEF 68%, mild to mod pulm HTN, mod TR. Mild aortic valve sclerosis.   -Recent diagnosis of paroxysmal atrial fibrillation in the setting of hip/back surgery and steroid use initiated on Tambocor and metoprolol February 2022  -Patient was discharged with a monitor which revealed a subsequent 9-second pause.  Tambocor and flecainide were discontinued.  Patient had symptoms of dizziness pacemaker implant recommended  -s/p DDD SJM  implant. Pt has done well overnight. The chest Xray shows no pneumo,  chest incision site unremarkable. Wound care and post device care have been reviewed with the patient in detail. Pt will have a wound check in 7-10 days then a follow up with  in 3 months.   -Sotalol 80mg BID Initiated for rhythm control. Will need 12 lead EKG tomorrow as outpatient.  QTC stable at this time.  Continue current medical therapy        2.  Anticoagulation: [id number] equals 1 on aspirin 81 mg daily.     3. Recent bronchitis/ hypoxia / Pleural effusions  -pt took 10 days of Augmentin then just finished 7 days of doxycycline. Continues with wheezing, cough, SOB.  -CXR with pleural Effusion/ Pulm congestion  x/p Lasix with excellent diuresis - will repeat lasix this AM  - IS and repeat DuoNeb this AM. (discussed with nursing staff)   -used 2 L 02 overnight -Wean 02 this AM   -Follow up with PCP this week      3.  Tobacco/alcohol use: Still currently smoking about 1ppd. Drinks 2 mixed drinks per day.   -Immediate cessation reviewed with patient      4.  Possible abscess struct of sleep apnea.  Recent consult with Dr. Degroot sleep studies pending     5. HX of chronic kidney disease noted: Cr today normal    Plan for disposition: Will continue with 02 weaning. If 02 elton improve later today patient will be discharged to home today.  Needs repeat EKG at 3:00 pm to reevaluate QTC.  Pt will have a wound check in 7-10 days then a follow up with  in 3 months. Follow up with PCP this week re: bronchitis.     Electronically signed by VIKTORIYA Garza, 04/12/22, 6:54 AM EDT.      I, Domo Lopez MD, personally performed the services face to face as described and documented by the above named individual. I have made any necessary edits and it is both accurate and complete 4/12/2022  08:07 EDT

## 2022-04-18 NOTE — PROGRESS NOTES
Mr. Mcleod presents for wound check. He states he has felt better since his pacemaker placement. He has has some increase lower extremity edema and he would like to try some lasix to improve his symptoms. He will start lasix 40mg, kdur 20meq daily for five days. Follow up BMP 5 days.

## 2022-05-02 NOTE — TELEPHONE ENCOUNTER
Caller: Ubaldo Mcleod    Relationship: Self    Best call back number:  [phone]    Requested Prescriptions:   Requested Prescriptions      No prescriptions requested or ordered in this encounter    NITROFURANTOIN [facility] 100      Pharmacy where request should be sent:  [facility]     Additional details provided by patient:  PATIENT STATES HE WENT TO THE ER IN February AND THEY GAVE HIM A MEDICATION FOR A UTI AND HE STILL HAS DARK URINE AND AN ODOR   HE IS REQUESTING THE MEDICATION AGAIN   HE HAS 1 TABLET LEFT    Does the patient have less than a 3 day supply:  [x] Yes  [] No

## 2022-05-02 NOTE — TELEPHONE ENCOUNTER
Caller: Shani Shorty A    Relationship: Self    Best call back number: [phone]     What is the best time to reach you: ANYTIME     Who are you requesting to speak with (clinical staff, provider,  specific staff member):CLINICAL     Do you know the name of the person who called:THE PATIENT SHORTY    What was the call regarding: THE PATIENT IS REQUESTING A CALL BACK WITH THE STATUS OF THE MEDICATION REQUEST FOR THE MEDICATION NITROFURANTOIN  MG. THE PATIENT DECLINED AN APPOINTMENT AT THIS TIME.     Do you require a callback: YES, PLEASE CALL AND ADVISE

## 2022-05-03 NOTE — PROGRESS NOTES
Follow Up Office Visit      Date: 2022   Patient Name: Ubaldo Mcleod  : 1950   MRN: [id number]     Chief Complaint:    Chief Complaint   Patient presents with   • Urinary Tract Infection       History of Present Illness: Ubaldo Mcleod is a 71 y.o. male who is here today to follow up regarding UTI.  He was actually in the hospital  and  for bradycardia and received a pacemaker.  The day of his admission he had been given a prescription for nitrofurantoin to take for UTI, however he was treated for this in the hospital along with upper respiratory infection therefore he did not get the nitrofurantoin filled until a few days ago when he began having symptoms of urinary frequency, and odorous urine.  He denies any fever, no abdominal pain.  No blood in urine.  No unusual back pain.  Note he has a history of chronic kidney disease listed.  Last lab work reviewed from  showed normal BUN and creatinine.  He has some bilateral ear fullness as well today.    Subjective      Review of systems:  Review of Systems   Constitutional: Negative for fever.   Cardiovascular: Positive for leg swelling. Negative for chest pain.   Genitourinary: Negative for flank pain and hematuria.        I have reviewed and the following portions of the patient's history were updated as appropriate: past family history, past medical history, past social history, past surgical history and problem list.    Medications:     Current Outpatient Medications:   •  aspirin (aspirin) 81 MG EC tablet, Take 1 tablet by mouth Daily., Disp: 30 tablet, Rfl: 6  •  cyclobenzaprine (FLEXERIL) 10 MG tablet, Take 1 tablet by mouth 3 (Three) Times a Day As Needed for Muscle Spasms., Disp: 90 tablet, Rfl: 0  •  docusate sodium (COLACE) 100 MG capsule, Take 100 mg by mouth 2 (Two) Times a Day. As needed, Disp: , Rfl:   •  DULoxetine (CYMBALTA) 30 MG capsule, TAKE ONE CAPSULE BY MOUTH DAILY, Disp: 90 capsule, Rfl: 1  •   folic acid (FOLVITE) 1 MG tablet, Take 1 tablet by mouth Daily., Disp: 90 tablet, Rfl: 0  •  furosemide (LASIX) 40 MG tablet, Take 1 tablet by mouth Daily., Disp: 5 tablet, Rfl: 0  •  lidocaine (LIDODERM) 5 %, Place 1 patch on the skin as directed by provider Daily. Remove & Discard patch within 12 hours or as directed by MD (Patient taking differently: Place 1 patch on the skin as directed by provider Daily As Needed for Moderate Pain  (back). Remove & Discard patch within 12 hours or as directed by MD), Disp: 30 each, Rfl: 0  •  meloxicam (MOBIC) 15 MG tablet, Take 1 tablet by mouth Daily., Disp: 90 tablet, Rfl: 3  •  Mirabegron ER (Myrbetriq) 25 MG tablet sustained-release 24 hour 24 hr tablet, Take 1 tablet by mouth Daily., Disp: 30 tablet, Rfl: 2  •  nitrofurantoin (MACRODANTIN) 100 MG capsule, Take 100 mg by mouth 4 (Four) Times a Day., Disp: , Rfl:   •  pantoprazole (PROTONIX) 40 MG EC tablet, Take 1 tablet by mouth 2 (Two) Times a Day., Disp: 180 tablet, Rfl: 1  •  polyethylene glycol (MIRALAX) 17 g packet, Take 17 g by mouth Daily As Needed (constipation)., Disp:  , Rfl:   •  potassium chloride (K-DUR,KLOR-CON) 20 MEQ CR tablet, Take 1 tablet by mouth Daily., Disp: 5 tablet, Rfl: 0  •  sotalol (BETAPACE) 80 MG tablet, Take 1 tablet by mouth Every 12 (Twelve) Hours., Disp: 60 tablet, Rfl: 5  •  tamsulosin (FLOMAX) 0.4 MG capsule 24 hr capsule, TAKE 1 CAPSULE BY MOUTH AT BEDTIME (Patient taking differently: 1 capsule Daily.), Disp: 90 capsule, Rfl: 0  •  vitamin B-12 (CYANOCOBALAMIN) 1000 MCG tablet, Take 1,000 mcg by mouth Daily., Disp: , Rfl:   •  cefuroxime (CEFTIN) 250 MG tablet, Take 1 tablet by mouth 2 (Two) Times a Day., Disp: 20 tablet, Rfl: 1    Allergies:   Allergies   Allergen Reactions   • Versed [Midazolam] Mental Status Change     Pt became violent, and was confused for several days after receiving this medication.       Objective     Vital Signs:   Vitals:    05/03/22 1351   BP: 110/74   Pulse:  "64   Temp: 97.8 °F (36.6 °C)   SpO2: 97%   Weight: 78.7 kg (173 lb 6.4 oz)   Height: 175.3 cm (69\")   PainSc: 0-No pain     Body mass index is 25.61 kg/m².         Physical Exam:   Physical Exam  HENT:      Right Ear: Tympanic membrane and ear canal normal. There is no impacted cerumen.      Left Ear: Tympanic membrane and ear canal normal. There is no impacted cerumen.   Cardiovascular:      Rate and Rhythm: Normal rate and regular rhythm.   Pulmonary:      Breath sounds: Normal breath sounds.   Abdominal:      Tenderness: There is no abdominal tenderness.   Musculoskeletal:      Right lower le+ Edema present.      Left lower le+ Edema present.   Neurological:      Mental Status: He is alert.          Assessment / Plan      Assessment/Plan:   Diagnoses and all orders for this visit:    1. Urine frequency (Primary)  -     POCT urinalysis dipstick, automated    2. Urinary tract infection without hematuria, site unspecified  -     cefuroxime (CEFTIN) 250 MG tablet; Take 1 tablet by mouth 2 (Two) Times a Day.  Dispense: 20 tablet; Refill: 1    3. Edema, lower extremity    He was able to give us a small urine sample today and this showed moderate leukocytes and 1+ protein.  It was also quite cloudy.  We will treat with cefuroxime 250 mg as directed.  He will stop nitrofurantoin.  Discussed his lower extremity edema.  This has been an ongoing problem that he has been following with cardiology for.  I encouraged him to try to use walk more as this may help since he has been very sedentary due to his recent medical problems, as well as drink plenty of fluids.  He has a follow-up with cardiology in July.  If he has any worsening symptoms or he does not improve he will notify me.  His wife is present with him today as well.    Follow Up:   Return if symptoms worsen or fail to improve.    Ambar Rush PA-C   [facility]  "

## 2022-05-03 NOTE — TELEPHONE ENCOUNTER
Anesthesia Pre Eval Note  Visit Vitals  BP (!) 200/87   Pulse 84   Temp 36.4 °C (97.5 °F) (Temporal)   Resp 20   Ht 4' 11\" (1.499 m)   Wt 77.3 kg (170 lb 6.7 oz)   SpO2 96%   BMI 34.42 kg/m²     Anesthesia ROS/Med Hx    Overall Review:  EKG was reviewed     Anesthetic Complication History:  Patient does not have a history of anesthetic complications      Pulmonary Review:  Patient does not have a pulmonary history      Neuro/Psych Review:  Patient does not have a neuro/psych history       Cardiovascular Review:  Exercise tolerance: good (>4 METS)  Positive for dysrhythmias - Hx of SVT  Positive for hypertension  Positive for hyperlipidemia    GI/HEPATIC/RENAL Review:  Patient does not have a GI/hepatic/renalhistory       End/Other Review:    Positive for arthritis  Additional Results:  EKG:  No results found for this or any previous visit (from the past 4464 hour(s)).    ALLERGIES:   -- Pravastatin Sodium -- Other (See Comments)    --  Oral, bilateral arm muscle pain  higher doses   -- Simvastatin -- MYALGIA    --  Oral, muscle aches   -- Sulfa Antibiotics -- Other (See Comments)    --  Unknown       Lab Results       Component                Value               Date                       WBC                      9.5                 12/18/2019                 RBC                      3.26 (L)            12/18/2019                 HGB                      10.3 (L)            12/18/2019                 HCT                      31.1 (L)            12/18/2019                 MCHC                     33.1                12/18/2019                 SODIUM                   138                 12/18/2019                 POTASSIUM                4.0                 12/18/2019                 CHLORIDE                 106                 12/18/2019                 CO2                      27                  12/18/2019                 GLUCOSE                  123 (H)             12/18/2019                 BUN                    Pt is making an appt      20                  12/18/2019                 CREATININE               0.70                12/18/2019                 GFRA                                         12/18/2019             eGFR results = or >90 mL/min/1.73m2 = Normal kidney function.       GFRA                     >90                 12/18/2019                 GFRNA                    82                  12/18/2019                 GFRNA                                        12/18/2019             eGFR 60 - 89 mL/min/1.73m2 = Mild decrease in kidney function.       CALCIUM                  8.3 (L)             12/18/2019                 PLT                      145                 12/18/2019                 INR                      1.0                 05/27/2016             Past Medical History:  No date: Arthritis  No date: Carotid artery stenosis  No date: Dyslipidemia  No date: Essential hypertension  No date: Fatigue  No date: [place] (hard of hearing)      Comment:  hearing aides - reads lips   No date: Neck pain      Comment:  arm pain and numbness 2 months getting worse     Past Surgical History:  No date: Back surgery      Comment:  lumbar fusion x2  No date: Carpal tunnel release  No date: Joint replacement; Bilateral      Comment:  shoulder repalcement   12/2019: Spinal fusion      Comment:  lumbar fusion x2        Prior to Admission medications :  Medication gabapentin (NEURONTIN) 100 MG capsule, Sig Take 100 mg by mouth daily as needed. , Start Date 4/29/21, End Date , Taking? Yes, Authorizing Provider Outside Provider    Medication atorvastatin (LIPITOR) 10 MG tablet, Sig TAKE 1 TABLET BY MOUTH AT BEDTIME, Start Date 3/9/21, End Date , Taking? Yes, Authorizing Provider Angela Santana MD    Medication metoPROLOL succinate (TOPROL-XL) 100 MG 24 hr tablet, Sig TAKE 1 TABLET BY MOUTH DAILY, Start Date 3/9/21, End Date , Taking? Yes, Authorizing Provider Angela Santana MD    Medication hydrochlorothiazide (HYDRODIURIL) 25 MG tablet,  Sig TAKE 1 TABLET BY MOUTH DAILY, Start Date 3/9/21, End Date , Taking? Yes, Authorizing Provider Angela Santana MD    Medication tizanidine (ZANAFLEX) 2 MG capsule, Sig Take 2 mg by mouth 3 times daily as needed for Muscle spasms., Start Date 12/21/19, End Date , Taking? Yes, Authorizing Provider Provider Not In System    Medication Calcium Carb-Cholecalciferol (CALCIUM 600 + D PO), Sig Take 1 tablet by mouth daily., Start Date 12/21/19, End Date , Taking? Yes, Authorizing Provider Provider Not In System    Medication docusate sodium (COLACE) 100 MG capsule, Sig Take 100 mg by mouth daily as needed. , Start Date 12/23/19, End Date , Taking? Yes, Authorizing Provider Provider Not In System    Medication Omega-3 Fatty Acids (OMEGA 3 PO), Sig Take 1,200 mg by mouth., Start Date , End Date , Taking? Yes, Authorizing Provider Outside Provider    Medication Cholecalciferol (VITAMIN D) 2000 units capsule, Sig Take 2,000 Units by mouth 2 times daily., Start Date 3/29/18, End Date , Taking? Yes, Authorizing Provider Outside Provider    Medication enalapril (VASOTEC) 20 MG tablet, Sig TAKE 1 TABLET BY MOUTH TWICE DAILY, Start Date 6/16/16, End Date , Taking? Yes, Authorizing Provider Outside Provider    Medication hydroxychloroquine (PLAQUENIL) 200 MG tablet, Sig 1 TABLET TWICE DAILY., Start Date , End Date , Taking? Yes, Authorizing Provider Outside Provider    Medication turmeric 500 MG capsule, Sig , Start Date , End Date , Taking? , Authorizing Provider Outside Provider    Medication Calcium-Vitamins C & D (CALCIUM/C/D PO), Sig , Start Date , End Date , Taking? , Authorizing Provider Outside Provider    Medication nystatin-triamcinolone (MYCOLOG II) 262808-5.1 UNIT/GM-% cream, Sig Apply topically 2 times daily.  Patient taking differently: Apply topically 2 times daily as needed. , Start Date 4/22/20, End Date , Taking? , Authorizing Provider Angela Santana MD    Medication polyethylene glycol (GLYCOLAX,  MIRALAX) packet, Sig Take 17 g by mouth daily as needed (constipation)., Start Date 12/21/19, End Date , Taking? , Authorizing Provider Provider Not In System    Medication aspirin 81 MG tablet, Sig Take 1 tablet by mouth every other day.  Patient taking differently: Take 81 mg by mouth every other day. Last dose 6/1/2021, Start Date 10/10/19, End Date , Taking? , Authorizing Provider Angela Santana MD            Relevant Problems   No relevant active problems       Physical Exam     Airway   Mallampati: II  TM Distance: >3 FB  Neck ROM: Full  Neck: Non-tender and Able to place in sniff position  TMJ Mobility: Good    Cardiovascular  Cardiovascular exam normal  Cardio Rhythm: Regular  Cardio Rate: Normal    Head Assessment  Head assessment: Normocephalic and Atraumatic    General Assessment  General Assessment: Alert and oriented and No acute distress    Dental Exam  Dental exam normal    Pulmonary Exam  Pulmonary exam normal  Breath sounds clear to auscultation:  Yes    Abdominal Exam  Abdominal exam normal      Anesthesia Plan:  Anesthesia Plan    ASA Status: 3    Anesthesia Type: General    Induction: Intravenous  Preferred Airway Type: ETT      Post-op Pain Management: Per Surgeon      Checklist  Reviewed: Lab Results, Pregnancy Test Results, Patient Summary, Care Everywhere, Allergies, Medications, DNR Status, Beta Blocker Status, Nursing Notes, EKG, Chest X-Rays, Consultations, Outside Records, NPO Status and Problem list  Consent/Risks Discussed Statement:  The proposed anesthetic plan, including its risks and benefits, have been discussed with the Patient along with the risks and benefits of alternatives. Questions were encouraged and answered and the patient and/or representative understands and agrees to proceed.        I discussed with the patient (and/or patient's legal representative) the risks and benefits of the proposed anesthesia plan, General, which may include services performed by other  anesthesia providers.    Alternative anesthesia plans, if available, were reviewed with the patient (and/or patient's legal representative). Discussion has been held with the patient (and/or patient's legal representative) regarding risks of anesthesia, which include Nausea, Vomiting, Headache, Hypotension, Dental Injury, Sore Throat, Allergic Reaction, Need for Blood Transfusion, Post-op Intubation, Aspiration, Nerve Injury, ICU Admit, Intra-operative Awareness and Emergence Delirium and emergent situations that may require change in anesthesia plan.    The patient (and/or patient's legal representative) has indicated understanding, his/her questions have been answered, and he/she wishes to proceed with the planned anesthetic.    Comments  Plan Comments: Risks and benefits of GA discussed with patient.  Discussion included, but was not limited to, above plus postoperative pain and nausea.  Discussed that any and all measures necessary for care and safety of patient will be undertaken as needed.  Pt voiced understanding of discussion as laid out.  All questions answered to voiced satisfaction and understanding. Cared for during COVID-19 pandemic crisis.

## 2022-05-10 NOTE — TELEPHONE ENCOUNTER
Per [facility] Merlin transmission received today, 5/10/2022:    S/P New [employer] pacemaker implant 4/11/2022.    Afib burden 5% since 4/11/2022  Longest episode 1 hr, 41 min  V-rates controlled. Report in [facility].     Pt is NOT taking a NOAC.

## 2022-05-28 NOTE — TELEPHONE ENCOUNTER
PT WIFE CALLED IN AND STATED PT IS HAVING UTI SYMPTOMS AGAIN AFTER BEING TREATED ON 5/3  AND WOULD LIKE MEDS CALLED IN. PLS CALL AND ADVISE

## 2022-06-13 NOTE — PROGRESS NOTES
Follow Up Office Visit      Patient Name: Ubaldo Mcleod  : 1950   MRN: [id number]     Chief Complaint: Lower urinary tract symptoms, overactivity the bladder    History of Present Illness: Ubaldo Mcleod is a 71 y.o. male who presents today for 3-month follow-up due to history of lower urinary tract symptoms, overactivity of the bladder.  Primary symptoms include urinary frequency urgency and urge associated incontinence.  He has previously undergone cystoscopy demonstrating appropriate anterior channel.  He was initiated on Myrbetriq therapy and reported significant improvement in symptoms on this medication.  He continues to report improvement.  He was diagnosed with a recent urinary tract infection.  Reports minimal fluid intake, decreasing fluid intake.  Denies current symptoms, resolution of symptoms after p.o. antibiotic.    Subjective      Review of System: Review of Systems   Constitutional: Negative for chills, fatigue, fever and unexpected weight change.   HENT: Negative for sore throat.    Eyes: Negative for visual disturbance.   Respiratory: Negative for cough, chest tightness and shortness of breath.    Cardiovascular: Negative for chest pain and leg swelling.   Gastrointestinal: Negative for blood in stool, constipation, diarrhea, nausea, rectal pain and vomiting.   Genitourinary: Negative for decreased urine volume, difficulty urinating, dysuria, enuresis, flank pain, frequency, genital sores, hematuria and urgency.   Musculoskeletal: Negative for back pain and joint swelling.   Skin: Negative for rash and wound.   Neurological: Negative for seizures, speech difficulty, weakness and headaches.   Psychiatric/Behavioral: Negative for confusion, sleep disturbance and suicidal ideas. The patient is not nervous/anxious.       I have reviewed the ROS documented by my clinical staff, updated as appropriate and I agree. Glenn Stubbs MD    I have reviewed and the following portions  of the patient's history were updated as appropriate: past family history, past medical history, past social history, past surgical history and problem list.    Medications:     Current Outpatient Medications:   •  aspirin (aspirin) 81 MG EC tablet, Take 1 tablet by mouth Daily., Disp: 30 tablet, Rfl: 6  •  cyclobenzaprine (FLEXERIL) 10 MG tablet, Take 1 tablet by mouth 3 (Three) Times a Day As Needed for Muscle Spasms., Disp: 90 tablet, Rfl: 0  •  docusate sodium (COLACE) 100 MG capsule, Take 100 mg by mouth 2 (Two) Times a Day. As needed, Disp: , Rfl:   •  DULoxetine (CYMBALTA) 30 MG capsule, TAKE ONE CAPSULE BY MOUTH DAILY, Disp: 90 capsule, Rfl: 1  •  folic acid (FOLVITE) 1 MG tablet, Take 1 tablet by mouth Daily., Disp: 90 tablet, Rfl: 0  •  furosemide (LASIX) 40 MG tablet, Take 1 tablet by mouth Daily., Disp: 5 tablet, Rfl: 0  •  lidocaine (LIDODERM) 5 %, Place 1 patch on the skin as directed by provider Daily. Remove & Discard patch within 12 hours or as directed by MD (Patient taking differently: Place 1 patch on the skin as directed by provider Daily As Needed for Moderate Pain  (back). Remove & Discard patch within 12 hours or as directed by MD), Disp: 30 each, Rfl: 0  •  meloxicam (MOBIC) 15 MG tablet, Take 1 tablet by mouth Daily., Disp: 90 tablet, Rfl: 3  •  Mirabegron ER (Myrbetriq) 25 MG tablet sustained-release 24 hour 24 hr tablet, Take 1 tablet by mouth Daily., Disp: 30 tablet, Rfl: 2  •  pantoprazole (PROTONIX) 40 MG EC tablet, Take 1 tablet by mouth 2 (Two) Times a Day., Disp: 180 tablet, Rfl: 1  •  polyethylene glycol (MIRALAX) 17 g packet, Take 17 g by mouth Daily As Needed (constipation)., Disp:  , Rfl:   •  potassium chloride (K-DUR,KLOR-CON) 20 MEQ CR tablet, Take 1 tablet by mouth Daily., Disp: 5 tablet, Rfl: 0  •  potassium chloride ER (K-TAB) 20 MEQ tablet controlled-release ER tablet, , Disp: , Rfl:   •  sotalol (BETAPACE) 80 MG tablet, Take 1 tablet by mouth Every 12 (Twelve) Hours.,  "Disp: 60 tablet, Rfl: 5  •  tamsulosin (FLOMAX) 0.4 MG capsule 24 hr capsule, TAKE 1 CAPSULE BY MOUTH AT BEDTIME (Patient taking differently: 1 capsule Daily.), Disp: 90 capsule, Rfl: 0  •  vitamin B-12 (CYANOCOBALAMIN) 1000 MCG tablet, Take 1,000 mcg by mouth Daily., Disp: , Rfl:   •  cefuroxime (CEFTIN) 250 MG tablet, Take 1 tablet by mouth 2 (Two) Times a Day., Disp: 20 tablet, Rfl: 1  •  Mirabegron ER (Myrbetriq) 25 MG tablet sustained-release 24 hour 24 hr tablet, Take 1 tablet by mouth Daily., Disp: 30 tablet, Rfl: 4    Allergies:   Allergies   Allergen Reactions   • Versed [Midazolam] Mental Status Change     Pt became violent, and was confused for several days after receiving this medication.         Post void residual bladder scan:   70mL     Objective     Physical Exam:   Vital Signs:   Vitals:    06/13/22 1145   Weight: 83 kg (183 lb)   Height: 175.3 cm (69.02\")   PainSc: 0-No pain     Body mass index is 27.01 kg/m².     Physical Exam  Vitals and nursing note reviewed.   Constitutional:       Appearance: Normal appearance.   HENT:      Head: Normocephalic and atraumatic.   Cardiovascular:      Comments: Well perfused  Pulmonary:      Effort: Pulmonary effort is normal.   Abdominal:      General: Abdomen is flat.      Palpations: Abdomen is soft.   Musculoskeletal:         General: Normal range of motion.   Skin:     General: Skin is warm and dry.   Neurological:      General: No focal deficit present.      Mental Status: He is alert and oriented to person, place, and time. Mental status is at baseline.   Psychiatric:         Mood and Affect: Mood normal.         Behavior: Behavior normal.         Thought Content: Thought content normal.         Judgment: Judgment normal.           Labs:   Brief Urine Lab Results  (Last result in the past 365 days)      Color   Clarity   Blood   Leuk Est   Nitrite   Protein   CREAT   Urine HCG        05/29/22 1131 Red   Cloudy   2+   Small (1+)   Positive   2+           "       Urine Culture    Urine Culture 2/14/22 5/29/22   Urine Culture >100,000 CFU/mL Escherichia coli (A) >100,000 CFU/mL Escherichia coli (A)   (A) Abnormal value               Lab Results   Component Value Date    GLUCOSE 91 04/12/2022    CALCIUM 8.6 04/12/2022     04/12/2022    K 4.0 04/12/2022    CO2 27.0 04/12/2022     04/12/2022    BUN 12 04/12/2022    CREATININE 0.80 04/12/2022    EGFRIFNONA 71 02/16/2022    BCR 15.0 04/12/2022    ANIONGAP 8.0 04/12/2022       Lab Results   Component Value Date    WBC 7.12 04/08/2022    HGB 10.8 (L) 04/08/2022    HCT 33.5 (L) 04/08/2022    MCV 97.4 (H) 04/08/2022     04/08/2022             Measures:   Tobacco:   Ubaldo Mcleod  reports that he has been smoking cigarettes. He started smoking about 56 years ago. He has a 37.50 pack-year smoking history. He has never used smokeless tobacco.. I have educated him on the risk of diseases from using tobacco products.    Assessment / Plan      Assessment/Plan:   71 y.o. male is seen today for follow up of lower urinary tract symptoms/overactive of the bladder.  He presents today for 3-month follow-up.  He is currently on Myrbetriq therapy and reports improvement in symptoms on this medication.  He reports stability in symptoms today.  Was recently diagnosed with urinary tract infection, reports resolution in symptoms with p.o. antibiotics.  We have discussed the importance of increasing fluid intake to avoid infection.  He has previously undergone cystoscopy demonstrating appropriate anterior channel.  PVR today appropriately low at 70 mL.  We have discussed conservative bey strategies to improve symptoms as well as for urinary tract infection reduction.  He is understanding agreeable.  He will follow-up in 6 months.    Diagnoses and all orders for this visit:    1. Urge incontinence  -     Mirabegron ER (Myrbetriq) 25 MG tablet sustained-release 24 hour 24 hr tablet; Take 1 tablet by mouth Daily.  Dispense:  30 tablet; Refill: 4    2. Urinary urgency  -     Mirabegron ER (Myrbetriq) 25 MG tablet sustained-release 24 hour 24 hr tablet; Take 1 tablet by mouth Daily.  Dispense: 30 tablet; Refill: 4         Follow Up:   Return in about 7 months (around 1/13/2023) for Recheck.     I spent approximately 30 minutes providing clinical care for this patient; including review of patient's chart and provider documentation, face to face time spent with patient in examination room (obtaining history, performing physical exam, discussing diagnosis and management options), placing orders, and completing patient documentation.     Glenn Stubbs MD  [facility] [place]

## 2022-06-20 NOTE — TELEPHONE ENCOUNTER
Per Elizabeth/SJM Merlin transmission received 06/18/22, patient currently in afib. Ventricular rate 70 to 110 bpm.  I called patient to clarify if he is taking Eliquis. Spoke with patient and wife. They both report that he is only taking ASA and Sotalol for his heart, he is currently not on anticoagulation therapy. Patient is asymptomatic currently with his afib. He is currently using the [facility] on [facility] in [place]. Wife reports we can call any medication changes or new instructions to her cell phone [phone]. If she does not answer, she ask that we leave her a message.       Per previous message Eliquis 5mg PO BID sent to pharmacy. Called patient. Instructed to stop ASA and start Eliquis. Patient has follow up with Will July 11th. Wife verbalized instructions to stop ASA and start Eliquis 5mg po twice daily. Reports she has fup appt on calendar.

## 2022-07-03 LAB — BACTERIA SPEC AEROBE CULT: ABNORMAL

## 2022-07-07 ENCOUNTER — TELEPHONE (OUTPATIENT)
Dept: UROLOGY | Facility: CLINIC | Age: 72
End: 2022-07-07

## 2022-07-07 NOTE — TELEPHONE ENCOUNTER
Please be informed that patient has passed. Patient has been marked  in the system. The date of death is: 22    Caller: YARELI NINO    Relationship: WIFE    Best call back number: [phone]

## 2022-07-08 PROBLEM — J40 BRONCHITIS: Status: ACTIVE | Noted: 2022-07-08

## 2022-07-08 PROBLEM — R06.2 WHEEZING: Status: ACTIVE | Noted: 2022-07-08

## 2022-07-08 NOTE — ASSESSMENT & PLAN NOTE
This is a chronic problem and he is followed per urology  Refill Flomax provided per patient request  Urinalysis ordered in office but patient could not urinate and wife took cup stating she would return urine when he was able to urinate

## 2022-07-08 NOTE — ASSESSMENT & PLAN NOTE
Start medications as directed including inhaler and antibiotic  Go to ER if respiratory distress  RTO or call PRN persistent or worsening symptoms

## 2022-07-08 NOTE — PROGRESS NOTES
"Chief Complaint  Shortness of Breath (Pt c/o symptoms being present for 3 days )    Subjective        Ubaldo Mcleod presents to [facility]  Shortness of Breath  This is a new problem. The current episode started in the past 7 days. The problem occurs intermittently. The problem has been gradually worsening. Associated symptoms include wheezing. Pertinent negatives include no abdominal pain, chest pain, fever, leg pain, rhinorrhea, sore throat or vomiting. The symptoms are aggravated by any activity. Risk factors include smoking. He has tried rest for the symptoms. The treatment provided no relief.   Wife accompanies patient for visit and he is using walker for ambulation.     Objective   Vital Signs:  /64 (BP Location: Left arm, Patient Position: Sitting, Cuff Size: Adult)   Pulse 84   Temp 98.6 °F (37 °C) (Infrared)   Ht 175.3 cm (69.02\")   Wt 81.2 kg (179 lb)   SpO2 98%   BMI 26.42 kg/m²   Estimated body mass index is 26.42 kg/m² as calculated from the following:    Height as of this encounter: 175.3 cm (69.02\").    Weight as of this encounter: 81.2 kg (179 lb).          Physical Exam  Vitals and nursing note reviewed.   Constitutional:       General: He is not in acute distress.     Appearance: Normal appearance. He is ill-appearing.   HENT:      Head: Normocephalic and atraumatic.      Right Ear: External ear normal.      Left Ear: External ear normal.      Nose: Nose normal.   Eyes:      Extraocular Movements: Extraocular movements intact.      Conjunctiva/sclera: Conjunctivae normal.      Pupils: Pupils are equal, round, and reactive to light.   Cardiovascular:      Rate and Rhythm: Normal rate and regular rhythm.      Heart sounds: Normal heart sounds.   Pulmonary:      Effort: Pulmonary effort is normal. No respiratory distress.      Breath sounds: No stridor. Wheezing and rhonchi present. No rales.      Comments: Scant expiratory wheezes right upper " lobe  Scant rhonchi bilateral upper lobes  Chest:      Chest wall: No tenderness.   Abdominal:      General: Bowel sounds are normal. There is no distension.      Palpations: Abdomen is soft.      Tenderness: There is no abdominal tenderness.   Musculoskeletal:      Right lower leg: Edema present.      Left lower leg: Edema present.   Skin:     General: Skin is warm and dry.      Findings: No rash.   Neurological:      Mental Status: He is alert and oriented to person, place, and time.   Psychiatric:         Mood and Affect: Mood normal.         Behavior: Behavior normal.         Thought Content: Thought content normal.         Judgment: Judgment normal.        Result Review :    Common labs    Common Labsle 2/16/22 2/16/22 4/8/22 4/8/22 4/8/22 4/12/22    0106 0106 1321 1321 1321    Glucose  111 (A)   110 (A) 91   BUN  21   13 12   Creatinine  1.03   0.76 0.80   eGFR Non African Am  71       Sodium  133 (A)   139 138   Potassium  4.9   4.0 4.0   Chloride  103   103 103   Calcium  8.4 (A)   9.2 8.6   WBC 7.27  7.12      Hemoglobin 10.2 (A)  10.8 (A)      Hematocrit 32.1 (A)  33.5 (A)      Platelets 144  292      Hemoglobin A1C    4.60 (A)     (A) Abnormal value            UA    Urinalysis 5/3/22 5/29/22 6/30/22   Ketones, UA Negative Trace (A) Trace (A)   Leukocytes, UA Moderate (2+) (A) Small (1+) (A) Moderate (2+) (A)   (A) Abnormal value       Comments are available for some flowsheets but are not being displayed.                     Assessment and Plan   Diagnoses and all orders for this visit:    1. Bronchitis (Primary)  Assessment & Plan:  Start medications as directed including inhaler and antibiotic  Go to ER if respiratory distress  RTO or call PRN persistent or worsening symptoms    Orders:  -     doxycycline (ADOXA) 100 MG tablet; Take 1 tablet by mouth 2 (Two) Times a Day for 10 days.  Dispense: 20 tablet; Refill: 0    2. Benign prostatic hyperplasia with urinary frequency  Assessment & Plan:  This is a  chronic problem and he is followed per urology  Refill Flomax provided per patient request  Urinalysis ordered in office but patient could not urinate and wife took cup stating she would return urine when he was able to urinate      Orders:  -     tamsulosin (FLOMAX) 0.4 MG capsule 24 hr capsule; Take 1 capsule by mouth every night at bedtime.  Dispense: 90 capsule; Refill: 0  -     POCT urinalysis dipstick, automated    3. B12 deficiency  -     vitamin B-12 (CYANOCOBALAMIN) 1000 MCG tablet; Take 1 tablet by mouth Daily.  Dispense: 30 tablet; Refill: 1    4. Wheezing  Assessment & Plan:  Start inhaler as directed and instructed to rinse mouth after each use to prevent yeast  Go to ER if worsened breathing or respiratory distress  Smoking cessation encouraged  Follow up with PCP upcoming    Orders:  -     albuterol sulfate  (90 Base) MCG/ACT inhaler; Inhale 2 puffs Every 4 (Four) Hours As Needed for Wheezing or Shortness of Air.  Dispense: 18 g; Refill: 0  -     doxycycline (ADOXA) 100 MG tablet; Take 1 tablet by mouth 2 (Two) Times a Day for 10 days.  Dispense: 20 tablet; Refill: 0           Follow Up   Return if symptoms worsen or fail to improve, for with PCP as scheduled upcoming.  Patient was given instructions and counseling regarding his condition or for health maintenance advice. Please see specific information pulled into the AVS if appropriate.

## 2022-07-08 NOTE — ASSESSMENT & PLAN NOTE
Start inhaler as directed and instructed to rinse mouth after each use to prevent yeast  Go to ER if worsened breathing or respiratory distress  Smoking cessation encouraged  Follow up with PCP upcoming

## (undated) DEVICE — PAD ARMBRD SURG CONVOL 7.5X20X2IN

## (undated) DEVICE — ST EXT IV SMARTSITE 2VLV SP M LL 5ML IV1

## (undated) DEVICE — HDRST POSTIN FM CRDL TRACH SLOT 9X8X4IN

## (undated) DEVICE — KT BIOP DEV 10G

## (undated) DEVICE — TBG PENCL TELESCP MEGADYNE SMOKE EVAC 10FT

## (undated) DEVICE — 3M™ STERI-STRIP™ REINFORCED ADHESIVE SKIN CLOSURES, R1547, 1/2 IN X 4 IN (12 MM X 100 MM), 6 STRIPS/ENVELOPE: Brand: 3M™ STERI-STRIP™

## (undated) DEVICE — LIMB HOLDER, WRIST/ANKLE: Brand: DEROYAL

## (undated) DEVICE — GW FOR TROCH NAIL 3.2X400MM

## (undated) DEVICE — CVR HNDL LIGHT RIGID

## (undated) DEVICE — UNDERCAST PADDING: Brand: DEROYAL

## (undated) DEVICE — KT KYPHOPLASTY BI BALN 15MM

## (undated) DEVICE — GLV SURG PREMIERPRO GAMMEX NEOPRN PF SZ8 GRN

## (undated) DEVICE — DECANT BG O JET

## (undated) DEVICE — BNDG ELAS CO-FLEX SLF ADHR 4IN5YD LF STRL

## (undated) DEVICE — SYS MIX CLEARMIX SGL/DBL VAC

## (undated) DEVICE — GOWN,REINF,POLY,ECL,PP SLV,XL: Brand: MEDLINE

## (undated) DEVICE — PATIENT RETURN ELECTRODE, SINGLE-USE, CONTACT QUALITY MONITORING, ADULT, WITH 9FT CORD, FOR PATIENTS WEIGING OVER 33LBS. (15KG): Brand: MEGADYNE

## (undated) DEVICE — ADULT, W/LG. BACK PAD, RADIOTRANSPARENT ELEMENT AND LEAD WIRE: Brand: DEFIBRILLATION ELECTRODES

## (undated) DEVICE — LEX NEURO ANGIOGRAPHY: Brand: MEDLINE INDUSTRIES, INC.

## (undated) DEVICE — DRSNG SURG AQUACEL AG 9X15CM

## (undated) DEVICE — Device

## (undated) DEVICE — BLANKT WARM UPPR/BDY ARM/OUT 57X196CM

## (undated) DEVICE — ST INF PRI SMRTSTE 20DRP 2VLV 24ML 117

## (undated) DEVICE — SNAP KOVER: Brand: UNBRANDED

## (undated) DEVICE — TUBING, SUCTION, 1/4" X 10', STRAIGHT: Brand: MEDLINE

## (undated) DEVICE — DEV INFL MONARCH 25W

## (undated) DEVICE — INTRO TEAR AWAY/LVD W/SD PRT 7F 13CM

## (undated) DEVICE — SET PRIMARY GRVTY 10DP MALE LL 104IN

## (undated) DEVICE — COVER,MAYO STAND,STERILE: Brand: MEDLINE

## (undated) DEVICE — GLV SURG PREMIERPRO MIC LTX PF SZ8 BRN

## (undated) DEVICE — SUT MONOCRYL PLS ANTIB UND 3/0  PS1 27IN

## (undated) DEVICE — SPK10295 ORTHOPEDIC FRACTURE KIT: Brand: SPK10295 ORTHOPEDIC FRACTURE KIT

## (undated) DEVICE — PK MAJ FX HIP 10

## (undated) DEVICE — BIT DRL 3FLUT QC CALIB 4.2X330X100MM

## (undated) DEVICE — PK HIP TOTL UNIV 10

## (undated) DEVICE — FEMORAL CANAL TIP, IRRIGATION/SUCTION

## (undated) DEVICE — NDL ACC DIR 10G

## (undated) DEVICE — DRAPE,TOP,102X53,STERILE: Brand: MEDLINE

## (undated) DEVICE — MEDI-VAC YANKAUER SUCTION HANDLE W/BULBOUS TIP: Brand: CARDINAL HEALTH

## (undated) DEVICE — ANTIBACTERIAL UNDYED BRAIDED (POLYGLACTIN 910), SYNTHETIC ABSORBABLE SUTURE: Brand: COATED VICRYL

## (undated) DEVICE — HANDPIECE SET WITH HIGH FLOW TIP AND SUCTION TUBE: Brand: INTERPULSE

## (undated) DEVICE — HEWSON SUTURE RETRIEVER: Brand: HEWSON SUTURE RETRIEVER

## (undated) DEVICE — SST TWIST DRILL, STANDARD, 2.4MM DIA. X 127MM: Brand: MICROAIRE®

## (undated) DEVICE — PEG PAD FOR SURG DISP

## (undated) DEVICE — DRSNG SURESITE123 4X4.8IN

## (undated) DEVICE — IRRIGATOR BULB ASEPTO 60CC STRL

## (undated) DEVICE — PROXIMATE RH ROTATING HEAD SKIN STAPLERS (35 WIDE) CONTAINS 35 STAINLESS STEEL STAPLES: Brand: PROXIMATE

## (undated) DEVICE — ELECTRD BLD EZ CLN STD 6.5IN

## (undated) DEVICE — CANN NASL CO2 DIVIDED A/

## (undated) DEVICE — SYS CLS SKIN PREMIERPRO EXOFINFUSION 22CM

## (undated) DEVICE — TRAP FLD MINIVAC MEGADYNE 100ML

## (undated) DEVICE — 1010 S-DRAPE TOWEL DRAPE 10/BX: Brand: STERI-DRAPE™

## (undated) DEVICE — ELECTRD RETRN/GRND MEGADYNE SGL/PLT W/CORD 9FT DISP